# Patient Record
Sex: MALE | Race: WHITE | NOT HISPANIC OR LATINO | Employment: UNEMPLOYED | URBAN - METROPOLITAN AREA
[De-identification: names, ages, dates, MRNs, and addresses within clinical notes are randomized per-mention and may not be internally consistent; named-entity substitution may affect disease eponyms.]

---

## 2022-10-12 ENCOUNTER — APPOINTMENT (EMERGENCY)
Dept: CT IMAGING | Facility: HOSPITAL | Age: 32
DRG: 384 | End: 2022-10-12
Payer: MEDICAID

## 2022-10-12 ENCOUNTER — HOSPITAL ENCOUNTER (INPATIENT)
Facility: HOSPITAL | Age: 32
LOS: 2 days | DRG: 384 | End: 2022-10-14
Attending: EMERGENCY MEDICINE | Admitting: INTERNAL MEDICINE
Payer: MEDICAID

## 2022-10-12 ENCOUNTER — HOSPITAL ENCOUNTER (EMERGENCY)
Facility: HOSPITAL | Age: 32
End: 2022-10-12
Attending: EMERGENCY MEDICINE
Payer: MEDICAID

## 2022-10-12 VITALS
WEIGHT: 198.41 LBS | HEART RATE: 108 BPM | TEMPERATURE: 97.8 F | SYSTOLIC BLOOD PRESSURE: 140 MMHG | DIASTOLIC BLOOD PRESSURE: 85 MMHG | OXYGEN SATURATION: 97 % | RESPIRATION RATE: 18 BRPM

## 2022-10-12 DIAGNOSIS — T44.3X4A ANTICHOLINERGIC DRUG OVERDOSE, UNDETERMINED INTENT, INITIAL ENCOUNTER: ICD-10-CM

## 2022-10-12 DIAGNOSIS — T50.902A INTENTIONAL OVERDOSE, INITIAL ENCOUNTER (HCC): ICD-10-CM

## 2022-10-12 DIAGNOSIS — F43.10 PTSD (POST-TRAUMATIC STRESS DISORDER): ICD-10-CM

## 2022-10-12 DIAGNOSIS — T44.3X1A ANTICHOLINERGIC DRUG OVERDOSE: ICD-10-CM

## 2022-10-12 DIAGNOSIS — S61.512A LACERATION OF LEFT WRIST, INITIAL ENCOUNTER: ICD-10-CM

## 2022-10-12 DIAGNOSIS — Z00.8 MEDICAL CLEARANCE FOR PSYCHIATRIC ADMISSION: ICD-10-CM

## 2022-10-12 DIAGNOSIS — F29 PSYCHOSIS, UNSPECIFIED PSYCHOSIS TYPE (HCC): Primary | ICD-10-CM

## 2022-10-12 DIAGNOSIS — T50.901A OVERDOSE: Primary | ICD-10-CM

## 2022-10-12 DIAGNOSIS — S61.511A LACERATION OF RIGHT WRIST, INITIAL ENCOUNTER: ICD-10-CM

## 2022-10-12 DIAGNOSIS — R41.82 ALTERED MENTAL STATUS: ICD-10-CM

## 2022-10-12 PROBLEM — D72.829 LEUKOCYTOSIS: Status: ACTIVE | Noted: 2022-10-12

## 2022-10-12 PROBLEM — R74.8 ELEVATED CK: Status: ACTIVE | Noted: 2022-10-12

## 2022-10-12 PROBLEM — S61.419A HAND LACERATION: Status: ACTIVE | Noted: 2022-10-12

## 2022-10-12 PROBLEM — S61.519A WRIST LACERATION: Status: ACTIVE | Noted: 2022-10-12

## 2022-10-12 LAB
ALBUMIN SERPL BCP-MCNC: 4.8 G/DL (ref 3.5–5)
ALBUMIN SERPL BCP-MCNC: 5.4 G/DL (ref 3.5–5)
ALP SERPL-CCNC: 37 U/L (ref 34–104)
ALP SERPL-CCNC: 49 U/L (ref 46–116)
ALT SERPL W P-5'-P-CCNC: 16 U/L (ref 7–52)
ALT SERPL W P-5'-P-CCNC: 28 U/L (ref 12–78)
AMPHETAMINES SERPL QL SCN: NEGATIVE
ANION GAP SERPL CALCULATED.3IONS-SCNC: 11 MMOL/L (ref 4–13)
ANION GAP SERPL CALCULATED.3IONS-SCNC: 7 MMOL/L (ref 4–13)
APAP SERPL-MCNC: <10 UG/ML (ref 10–20)
APAP SERPL-MCNC: <2 UG/ML (ref 10–20)
AST SERPL W P-5'-P-CCNC: 17 U/L (ref 13–39)
AST SERPL W P-5'-P-CCNC: 17 U/L (ref 5–45)
ATRIAL RATE: 133 BPM
BARBITURATES UR QL: NEGATIVE
BASE EX.OXY STD BLDV CALC-SCNC: 83.4 % (ref 60–80)
BASE EXCESS BLDV CALC-SCNC: -0.9 MMOL/L
BASOPHILS # BLD AUTO: 0.04 THOUSANDS/ΜL (ref 0–0.1)
BASOPHILS # BLD AUTO: 0.08 THOUSANDS/ΜL (ref 0–0.1)
BASOPHILS NFR BLD AUTO: 0 % (ref 0–1)
BASOPHILS NFR BLD AUTO: 1 % (ref 0–1)
BENZODIAZ UR QL: NEGATIVE
BILIRUB SERPL-MCNC: 0.64 MG/DL (ref 0.2–1)
BILIRUB SERPL-MCNC: 0.64 MG/DL (ref 0.2–1)
BUN SERPL-MCNC: 6 MG/DL (ref 5–25)
BUN SERPL-MCNC: 7 MG/DL (ref 5–25)
CALCIUM SERPL-MCNC: 9.7 MG/DL (ref 8.4–10.2)
CALCIUM SERPL-MCNC: 9.8 MG/DL (ref 8.3–10.1)
CARDIAC TROPONIN I PNL SERPL HS: 2 NG/L (ref 8–18)
CHLORIDE SERPL-SCNC: 102 MMOL/L (ref 96–108)
CHLORIDE SERPL-SCNC: 105 MMOL/L (ref 96–108)
CK MB SERPL-MCNC: 0.6 % (ref 0–2.5)
CK MB SERPL-MCNC: 2.5 NG/ML (ref 0.6–6.3)
CK SERPL-CCNC: 410 U/L (ref 39–308)
CO2 SERPL-SCNC: 27 MMOL/L (ref 21–32)
CO2 SERPL-SCNC: 27 MMOL/L (ref 21–32)
COCAINE UR QL: NEGATIVE
CREAT SERPL-MCNC: 0.77 MG/DL (ref 0.6–1.3)
CREAT SERPL-MCNC: 1.01 MG/DL (ref 0.6–1.3)
EOSINOPHIL # BLD AUTO: 0.01 THOUSAND/ΜL (ref 0–0.61)
EOSINOPHIL # BLD AUTO: 0.03 THOUSAND/ΜL (ref 0–0.61)
EOSINOPHIL NFR BLD AUTO: 0 % (ref 0–6)
EOSINOPHIL NFR BLD AUTO: 0 % (ref 0–6)
ERYTHROCYTE [DISTWIDTH] IN BLOOD BY AUTOMATED COUNT: 12.5 % (ref 11.6–15.1)
ERYTHROCYTE [DISTWIDTH] IN BLOOD BY AUTOMATED COUNT: 12.6 % (ref 11.6–15.1)
ETHANOL SERPL-MCNC: <10 MG/DL
ETHANOL SERPL-MCNC: <3 MG/DL (ref 0–3)
FLUAV RNA RESP QL NAA+PROBE: NEGATIVE
FLUBV RNA RESP QL NAA+PROBE: NEGATIVE
GFR SERPL CREATININE-BSD FRML MDRD: 120 ML/MIN/1.73SQ M
GFR SERPL CREATININE-BSD FRML MDRD: 98 ML/MIN/1.73SQ M
GLUCOSE SERPL-MCNC: 115 MG/DL (ref 65–140)
GLUCOSE SERPL-MCNC: 82 MG/DL (ref 65–140)
HCO3 BLDV-SCNC: 24.6 MMOL/L (ref 24–30)
HCT VFR BLD AUTO: 42.1 % (ref 36.5–49.3)
HCT VFR BLD AUTO: 47.6 % (ref 36.5–49.3)
HGB BLD-MCNC: 14 G/DL (ref 12–17)
HGB BLD-MCNC: 16.1 G/DL (ref 12–17)
IMM GRANULOCYTES # BLD AUTO: 0.04 THOUSAND/UL (ref 0–0.2)
IMM GRANULOCYTES # BLD AUTO: 0.09 THOUSAND/UL (ref 0–0.2)
IMM GRANULOCYTES NFR BLD AUTO: 0 % (ref 0–2)
IMM GRANULOCYTES NFR BLD AUTO: 1 % (ref 0–2)
LYMPHOCYTES # BLD AUTO: 1.21 THOUSANDS/ΜL (ref 0.6–4.47)
LYMPHOCYTES # BLD AUTO: 1.58 THOUSANDS/ΜL (ref 0.6–4.47)
LYMPHOCYTES NFR BLD AUTO: 10 % (ref 14–44)
LYMPHOCYTES NFR BLD AUTO: 9 % (ref 14–44)
MCH RBC QN AUTO: 31.5 PG (ref 26.8–34.3)
MCH RBC QN AUTO: 31.6 PG (ref 26.8–34.3)
MCHC RBC AUTO-ENTMCNC: 33.3 G/DL (ref 31.4–37.4)
MCHC RBC AUTO-ENTMCNC: 33.8 G/DL (ref 31.4–37.4)
MCV RBC AUTO: 93 FL (ref 82–98)
MCV RBC AUTO: 95 FL (ref 82–98)
METHADONE UR QL: NEGATIVE
MONOCYTES # BLD AUTO: 0.84 THOUSAND/ΜL (ref 0.17–1.22)
MONOCYTES # BLD AUTO: 1.35 THOUSAND/ΜL (ref 0.17–1.22)
MONOCYTES NFR BLD AUTO: 6 % (ref 4–12)
MONOCYTES NFR BLD AUTO: 9 % (ref 4–12)
NEUTROPHILS # BLD AUTO: 11.08 THOUSANDS/ΜL (ref 1.85–7.62)
NEUTROPHILS # BLD AUTO: 12.2 THOUSANDS/ΜL (ref 1.85–7.62)
NEUTS SEG NFR BLD AUTO: 80 % (ref 43–75)
NEUTS SEG NFR BLD AUTO: 84 % (ref 43–75)
NRBC BLD AUTO-RTO: 0 /100 WBCS
NRBC BLD AUTO-RTO: 0 /100 WBCS
O2 CT BLDV-SCNC: 17.2 ML/DL
OPIATES UR QL SCN: NEGATIVE
OXYCODONE+OXYMORPHONE UR QL SCN: NEGATIVE
P AXIS: 60 DEGREES
PCO2 BLDV: 43.5 MM HG (ref 42–50)
PCP UR QL: NEGATIVE
PH BLDV: 7.37 [PH] (ref 7.3–7.4)
PLATELET # BLD AUTO: 215 THOUSANDS/UL (ref 149–390)
PLATELET # BLD AUTO: 301 THOUSANDS/UL (ref 149–390)
PMV BLD AUTO: 10.9 FL (ref 8.9–12.7)
PMV BLD AUTO: 11 FL (ref 8.9–12.7)
PO2 BLDV: 49.3 MM HG (ref 35–45)
POTASSIUM SERPL-SCNC: 3.5 MMOL/L (ref 3.5–5.3)
POTASSIUM SERPL-SCNC: 4 MMOL/L (ref 3.5–5.3)
PR INTERVAL: 144 MS
PROT SERPL-MCNC: 6.9 G/DL (ref 6.4–8.4)
PROT SERPL-MCNC: 8.4 G/DL (ref 6.4–8.4)
QRS AXIS: 44 DEGREES
QRSD INTERVAL: 76 MS
QT INTERVAL: 292 MS
QTC INTERVAL: 434 MS
RBC # BLD AUTO: 4.44 MILLION/UL (ref 3.88–5.62)
RBC # BLD AUTO: 5.1 MILLION/UL (ref 3.88–5.62)
RSV RNA RESP QL NAA+PROBE: NEGATIVE
SALICYLATES SERPL-MCNC: 12 MG/DL (ref 3–20)
SALICYLATES SERPL-MCNC: 5 MG/DL (ref 3–20)
SARS-COV-2 RNA RESP QL NAA+PROBE: NEGATIVE
SODIUM SERPL-SCNC: 139 MMOL/L (ref 135–147)
SODIUM SERPL-SCNC: 140 MMOL/L (ref 135–147)
T WAVE AXIS: 60 DEGREES
THC UR QL: NEGATIVE
VENTRICULAR RATE: 133 BPM
WBC # BLD AUTO: 13.27 THOUSAND/UL (ref 4.31–10.16)
WBC # BLD AUTO: 15.28 THOUSAND/UL (ref 4.31–10.16)

## 2022-10-12 PROCEDURE — 85025 COMPLETE CBC W/AUTO DIFF WBC: CPT | Performed by: EMERGENCY MEDICINE

## 2022-10-12 PROCEDURE — 82550 ASSAY OF CK (CPK): CPT | Performed by: EMERGENCY MEDICINE

## 2022-10-12 PROCEDURE — 80143 DRUG ASSAY ACETAMINOPHEN: CPT | Performed by: EMERGENCY MEDICINE

## 2022-10-12 PROCEDURE — 80307 DRUG TEST PRSMV CHEM ANLYZR: CPT | Performed by: EMERGENCY MEDICINE

## 2022-10-12 PROCEDURE — 80053 COMPREHEN METABOLIC PANEL: CPT | Performed by: EMERGENCY MEDICINE

## 2022-10-12 PROCEDURE — 96372 THER/PROPH/DIAG INJ SC/IM: CPT

## 2022-10-12 PROCEDURE — 99254 IP/OBS CNSLTJ NEW/EST MOD 60: CPT | Performed by: SURGERY

## 2022-10-12 PROCEDURE — 93010 ELECTROCARDIOGRAM REPORT: CPT | Performed by: INTERNAL MEDICINE

## 2022-10-12 PROCEDURE — 80179 DRUG ASSAY SALICYLATE: CPT | Performed by: EMERGENCY MEDICINE

## 2022-10-12 PROCEDURE — 93005 ELECTROCARDIOGRAM TRACING: CPT

## 2022-10-12 PROCEDURE — G1004 CDSM NDSC: HCPCS

## 2022-10-12 PROCEDURE — 90471 IMMUNIZATION ADMIN: CPT

## 2022-10-12 PROCEDURE — 82077 ASSAY SPEC XCP UR&BREATH IA: CPT | Performed by: EMERGENCY MEDICINE

## 2022-10-12 PROCEDURE — 12001 RPR S/N/AX/GEN/TRNK 2.5CM/<: CPT | Performed by: EMERGENCY MEDICINE

## 2022-10-12 PROCEDURE — 70450 CT HEAD/BRAIN W/O DYE: CPT

## 2022-10-12 PROCEDURE — 82553 CREATINE MB FRACTION: CPT | Performed by: EMERGENCY MEDICINE

## 2022-10-12 PROCEDURE — 96361 HYDRATE IV INFUSION ADD-ON: CPT

## 2022-10-12 PROCEDURE — 82805 BLOOD GASES W/O2 SATURATION: CPT | Performed by: EMERGENCY MEDICINE

## 2022-10-12 PROCEDURE — 99285 EMERGENCY DEPT VISIT HI MDM: CPT | Performed by: EMERGENCY MEDICINE

## 2022-10-12 PROCEDURE — 90715 TDAP VACCINE 7 YRS/> IM: CPT

## 2022-10-12 PROCEDURE — 0241U HB NFCT DS VIR RESP RNA 4 TRGT: CPT | Performed by: EMERGENCY MEDICINE

## 2022-10-12 PROCEDURE — 0HQBXZZ REPAIR RIGHT UPPER ARM SKIN, EXTERNAL APPROACH: ICD-10-PCS | Performed by: INTERNAL MEDICINE

## 2022-10-12 PROCEDURE — 99223 1ST HOSP IP/OBS HIGH 75: CPT | Performed by: INTERNAL MEDICINE

## 2022-10-12 PROCEDURE — 99285 EMERGENCY DEPT VISIT HI MDM: CPT

## 2022-10-12 PROCEDURE — 36415 COLL VENOUS BLD VENIPUNCTURE: CPT | Performed by: EMERGENCY MEDICINE

## 2022-10-12 PROCEDURE — 96374 THER/PROPH/DIAG INJ IV PUSH: CPT

## 2022-10-12 PROCEDURE — 84484 ASSAY OF TROPONIN QUANT: CPT | Performed by: EMERGENCY MEDICINE

## 2022-10-12 PROCEDURE — 12002 RPR S/N/AX/GEN/TRNK2.6-7.5CM: CPT | Performed by: SURGERY

## 2022-10-12 PROCEDURE — 99284 EMERGENCY DEPT VISIT MOD MDM: CPT

## 2022-10-12 PROCEDURE — 99448 NTRPROF PH1/NTRNET/EHR 21-30: CPT | Performed by: EMERGENCY MEDICINE

## 2022-10-12 PROCEDURE — 74177 CT ABD & PELVIS W/CONTRAST: CPT

## 2022-10-12 RX ORDER — LIDOCAINE HYDROCHLORIDE 10 MG/ML
10 INJECTION, SOLUTION EPIDURAL; INFILTRATION; INTRACAUDAL; PERINEURAL ONCE
Status: DISCONTINUED | OUTPATIENT
Start: 2022-10-12 | End: 2022-10-12 | Stop reason: SDUPTHER

## 2022-10-12 RX ORDER — MIDAZOLAM HYDROCHLORIDE 2 MG/2ML
5 INJECTION, SOLUTION INTRAMUSCULAR; INTRAVENOUS ONCE
Status: COMPLETED | OUTPATIENT
Start: 2022-10-12 | End: 2022-10-12

## 2022-10-12 RX ORDER — SENNOSIDES 8.6 MG
1 TABLET ORAL
Status: DISCONTINUED | OUTPATIENT
Start: 2022-10-12 | End: 2022-10-14 | Stop reason: HOSPADM

## 2022-10-12 RX ORDER — KETAMINE HCL IN NACL, ISO-OSM 100MG/10ML
100 SYRINGE (ML) INJECTION ONCE
Status: COMPLETED | OUTPATIENT
Start: 2022-10-12 | End: 2022-10-12

## 2022-10-12 RX ORDER — PROPOFOL 10 MG/ML
100 INJECTION, EMULSION INTRAVENOUS ONCE
Status: COMPLETED | OUTPATIENT
Start: 2022-10-12 | End: 2022-10-12

## 2022-10-12 RX ORDER — LORAZEPAM 2 MG/ML
2 INJECTION INTRAMUSCULAR ONCE
Status: COMPLETED | OUTPATIENT
Start: 2022-10-12 | End: 2022-10-12

## 2022-10-12 RX ORDER — SODIUM CHLORIDE, SODIUM GLUCONATE, SODIUM ACETATE, POTASSIUM CHLORIDE, MAGNESIUM CHLORIDE, SODIUM PHOSPHATE, DIBASIC, AND POTASSIUM PHOSPHATE .53; .5; .37; .037; .03; .012; .00082 G/100ML; G/100ML; G/100ML; G/100ML; G/100ML; G/100ML; G/100ML
125 INJECTION, SOLUTION INTRAVENOUS CONTINUOUS
Status: DISCONTINUED | OUTPATIENT
Start: 2022-10-12 | End: 2022-10-14

## 2022-10-12 RX ORDER — LORAZEPAM 2 MG/ML
2 INJECTION INTRAMUSCULAR ONCE
Status: DISCONTINUED | OUTPATIENT
Start: 2022-10-12 | End: 2022-10-12

## 2022-10-12 RX ORDER — LIDOCAINE HYDROCHLORIDE 10 MG/ML
10 INJECTION, SOLUTION EPIDURAL; INFILTRATION; INTRACAUDAL; PERINEURAL ONCE
Status: COMPLETED | OUTPATIENT
Start: 2022-10-12 | End: 2022-10-12

## 2022-10-12 RX ORDER — LIDOCAINE HYDROCHLORIDE AND EPINEPHRINE 10; 10 MG/ML; UG/ML
1 INJECTION, SOLUTION INFILTRATION; PERINEURAL ONCE
Status: COMPLETED | OUTPATIENT
Start: 2022-10-12 | End: 2022-10-12

## 2022-10-12 RX ORDER — CEPHALEXIN 500 MG/1
500 CAPSULE ORAL EVERY 6 HOURS SCHEDULED
Status: DISCONTINUED | OUTPATIENT
Start: 2022-10-12 | End: 2022-10-14

## 2022-10-12 RX ORDER — ACETAMINOPHEN 325 MG/1
650 TABLET ORAL EVERY 6 HOURS PRN
Status: DISCONTINUED | OUTPATIENT
Start: 2022-10-12 | End: 2022-10-14 | Stop reason: HOSPADM

## 2022-10-12 RX ORDER — LANOLIN ALCOHOL/MO/W.PET/CERES
6 CREAM (GRAM) TOPICAL
Status: DISCONTINUED | OUTPATIENT
Start: 2022-10-12 | End: 2022-10-14 | Stop reason: HOSPADM

## 2022-10-12 RX ORDER — LORAZEPAM 2 MG/ML
0.5 INJECTION INTRAMUSCULAR EVERY 4 HOURS PRN
Status: DISCONTINUED | OUTPATIENT
Start: 2022-10-12 | End: 2022-10-14 | Stop reason: HOSPADM

## 2022-10-12 RX ORDER — HALOPERIDOL 5 MG/ML
5 INJECTION INTRAMUSCULAR ONCE
Status: COMPLETED | OUTPATIENT
Start: 2022-10-12 | End: 2022-10-12

## 2022-10-12 RX ADMIN — LORAZEPAM 2 MG: 2 INJECTION INTRAMUSCULAR; INTRAVENOUS at 05:08

## 2022-10-12 RX ADMIN — Medication 100 MG: at 13:45

## 2022-10-12 RX ADMIN — LIDOCAINE HYDROCHLORIDE 10 ML: 10 INJECTION, SOLUTION EPIDURAL; INFILTRATION; INTRACAUDAL at 12:49

## 2022-10-12 RX ADMIN — MIDAZOLAM HYDROCHLORIDE 5 MG: 1 INJECTION, SOLUTION INTRAMUSCULAR; INTRAVENOUS at 08:15

## 2022-10-12 RX ADMIN — HALOPERIDOL LACTATE 5 MG: 5 INJECTION, SOLUTION INTRAMUSCULAR at 06:01

## 2022-10-12 RX ADMIN — TETANUS TOXOID, REDUCED DIPHTHERIA TOXOID AND ACELLULAR PERTUSSIS VACCINE, ADSORBED 0.5 ML: 5; 2.5; 8; 8; 2.5 SUSPENSION INTRAMUSCULAR at 14:45

## 2022-10-12 RX ADMIN — LIDOCAINE HYDROCHLORIDE AND EPINEPHRINE 1 ML: 10; 10 INJECTION, SOLUTION INFILTRATION; PERINEURAL at 11:11

## 2022-10-12 RX ADMIN — SODIUM CHLORIDE 1000 ML: 0.9 INJECTION, SOLUTION INTRAVENOUS at 13:38

## 2022-10-12 RX ADMIN — CEPHALEXIN 500 MG: 500 CAPSULE ORAL at 17:55

## 2022-10-12 RX ADMIN — MIDAZOLAM HYDROCHLORIDE 5 MG: 1 INJECTION, SOLUTION INTRAMUSCULAR; INTRAVENOUS at 10:55

## 2022-10-12 RX ADMIN — SODIUM CHLORIDE 1000 ML: 0.9 INJECTION, SOLUTION INTRAVENOUS at 05:08

## 2022-10-12 RX ADMIN — CEPHALEXIN 500 MG: 500 CAPSULE ORAL at 23:07

## 2022-10-12 RX ADMIN — IOHEXOL 100 ML: 300 INJECTION, SOLUTION INTRAVENOUS at 08:40

## 2022-10-12 RX ADMIN — SODIUM CHLORIDE, SODIUM GLUCONATE, SODIUM ACETATE, POTASSIUM CHLORIDE, MAGNESIUM CHLORIDE, SODIUM PHOSPHATE, DIBASIC, AND POTASSIUM PHOSPHATE 125 ML/HR: .53; .5; .37; .037; .03; .012; .00082 INJECTION, SOLUTION INTRAVENOUS at 15:42

## 2022-10-12 RX ADMIN — CEPHALEXIN 500 MG: 500 CAPSULE ORAL at 14:48

## 2022-10-12 RX ADMIN — LIDOCAINE HYDROCHLORIDE,EPINEPHRINE BITARTRATE 1 ML: 10; .01 INJECTION, SOLUTION INFILTRATION; PERINEURAL at 05:08

## 2022-10-12 RX ADMIN — Medication 6 MG: at 23:07

## 2022-10-12 RX ADMIN — PROPOFOL 60 MG: 10 INJECTION, EMULSION INTRAVENOUS at 13:58

## 2022-10-12 RX ADMIN — LORAZEPAM 2 MG: 2 INJECTION INTRAMUSCULAR; INTRAVENOUS at 06:22

## 2022-10-12 NOTE — ASSESSMENT & PLAN NOTE
· Patient able to tell us that he ingested 3/4 of a bottle of Benadryl  · Still undetermined if really intentional or not, as patient at times still not that coherent and does not answer directly the questions  · Suspect may be intentional, as per discussion with the patient's parents, patient has history of PTSD and recently had been sad about his dog, Radha, who is 25years old who is scheduled to be taken down  · Patient developed signs and symptoms of anticholinergic toxicity with the Benadryl overdose:  Patient had agitation, delirium and urinary retention  Bladder catheterization was done which resulted in 1 5 L of urine drained from the bladder  Patient required couple of doses of Versed for agitation  ·  on board  · Telemetry  · IV fluids  · As per recommendations from the , will monitor EKG to ensure no prolongation of patient's QRS (according to the notes, however, I think what the  met was QT interval)  Repeat EKG just a few minutes ago revealed a mildly prolonged QTC interval at 444 milliseconds  Continue EKG monitoring  Continue telemetry  · As per , for patient's agitation, he prefers lorazepam   Thus patient will be on Ativan p r n  Gerhardt Sep · As per recommendations by the ER physician, according to the , we will have the patient on step-down level 2 for now

## 2022-10-12 NOTE — ED NOTES
RN unwrapped and rewrapped patient's b/l wrist lacerations  When RN unwrapped R laceration, there was part of the laceration not stitched and bleeding was uncontrolled  Dr Hiram Preston was made aware and put in a stitch at bedside to control bleeding  L wrist laceration bleeding is controlled  Will continue to monitor        Ernesto Grey RN  10/12/22 5523

## 2022-10-12 NOTE — ED PROVIDER NOTES
History  Chief Complaint   Patient presents with   • Wrist Laceration     Pt arrives via EMS from [facility] a transfer with accepting patient to hand surgery  Pt was found by parents with b/l, lateral lacerations to both wrists  No hx of substance abuse, pt was given haldol and ativan at [facility]  Unable to ask if pt is SI/HI as he is nonverbal upon arrival and has nystagmus  Hx of PTSD     Patient is a 71-year-old male with unknown PMH transferred to our emergency department with bilateral lacerations to the wrists and with altered mental status  It is reported from other [address] [place] [address] at the patient took a large amount of Benadryl and is altered from this  The patient has a very deep laceration to the left wrist that is parallel with the forearm and with muscle protruding from the wound  The patient's other lacerations to the right wrist have been repaired at prior emergency department and our repair with sutures is in place  At this time the patient is unable to answer any questions appropriately but is awake with eyes open and breathing on his own  According to prior note the patient is acutely psychotic and has PTSD  Family reported to prior physician that he had been on medications in the past but has not been taking them recently  There are no loved ones at bedside at this time  None       Past Medical History:   Diagnosis Date   • PTSD (post-traumatic stress disorder)        History reviewed  No pertinent surgical history  History reviewed  No pertinent family history  I have reviewed and agree with the history as documented      E-Cigarette/Vaping   • E-Cigarette Use Never User      E-Cigarette/Vaping Substances     Social History     Tobacco Use   • Smoking status: Never Smoker   • Smokeless tobacco: Never Used   Vaping Use   • Vaping Use: Never used   Substance Use Topics   • Drug use: Yes     Types: Marijuana        Review of Systems       Physical Exam  ED Triage Vitals Temperature Pulse Respirations Blood Pressure SpO2   10/12/22 0745 10/12/22 0745 10/12/22 0745 10/12/22 0745 10/12/22 0745   98 3 °F (36 8 °C) 99 20 156/97 95 %      Temp Source Heart Rate Source Patient Position - Orthostatic VS BP Location FiO2 (%)   10/12/22 0745 10/12/22 0745 10/12/22 0745 10/12/22 0745 --   Oral Monitor Lying Right arm       Pain Score       10/12/22 1410       No Pain             Orthostatic Vital Signs  Vitals:    10/12/22 1500 10/12/22 1530 10/12/22 1600 10/12/22 1630   BP: 127/65 141/74 125/73 129/79   Pulse: 101 92 96 98   Patient Position - Orthostatic VS:  Sitting Sitting        Physical Exam  Vitals and nursing note reviewed  Constitutional:       General: He is in acute distress  Appearance: He is normal weight  HENT:      Head: Normocephalic and atraumatic  Right Ear: External ear normal       Left Ear: External ear normal       Mouth/Throat:      Mouth: Mucous membranes are dry  Pharynx: No oropharyngeal exudate or posterior oropharyngeal erythema  Eyes:      Extraocular Movements: Extraocular movements intact  Conjunctiva/sclera: Conjunctivae normal       Pupils: Pupils are equal, round, and reactive to light  Comments: Pupils 4+   Cardiovascular:      Rate and Rhythm: Normal rate and regular rhythm  Pulses: Normal pulses  Heart sounds: Normal heart sounds  No murmur heard  Pulmonary:      Effort: Pulmonary effort is normal  No respiratory distress  Breath sounds: Normal breath sounds  No wheezing, rhonchi or rales  Abdominal:      General: Abdomen is flat  Palpations: Abdomen is soft  Tenderness: There is abdominal tenderness  There is no guarding or rebound  Musculoskeletal:         General: No swelling, tenderness or deformity  Normal range of motion  Cervical back: Normal range of motion and neck supple  Right lower leg: No edema  Left lower leg: No edema        Comments: Parallel Lacerations to each wrist/forearm   Skin:     General: Skin is warm and dry  Capillary Refill: Capillary refill takes less than 2 seconds  Neurological:      Mental Status: He is lethargic and confused  GCS: GCS eye subscore is 4  GCS verbal subscore is 4  GCS motor subscore is 5  Cranial Nerves: Cranial nerves are intact  No facial asymmetry  Motor: Motor function is intact           ED Medications  Medications   multi-electrolyte (PLASMALYTE-A/ISOLYTE-S PH 7 4) IV solution (125 mL/hr Intravenous New Bag 10/12/22 1542)   acetaminophen (TYLENOL) tablet 650 mg (has no administration in time range)   senna (SENOKOT) tablet 8 6 mg (has no administration in time range)   LORazepam (ATIVAN) injection 0 5 mg (has no administration in time range)   cephalexin (KEFLEX) capsule 500 mg (500 mg Oral Given 10/12/22 1448)   midazolam (VERSED) injection 5 mg (5 mg Intravenous Given 10/12/22 0815)   iohexol (OMNIPAQUE) 300 mg/mL injection 100 mL (100 mL Intravenous Given 10/12/22 0840)   midazolam (VERSED) injection 5 mg (5 mg Intravenous Given 10/12/22 1055)   lidocaine-epinephrine (XYLOCAINE/EPINEPHRINE) 1 %-1:100,000 injection 1 mL (1 mL Infiltration Given by Other 10/12/22 1111)   lidocaine (PF) (XYLOCAINE-MPF) 1 % injection 10 mL (10 mL Infiltration Given by Other 10/12/22 1249)   Ketamine HCl 100 mg (100 mg Intravenous Given by Other 10/12/22 1345)   propofol (DIPRIVAN) 200 MG/20ML bolus injection 100 mg (60 mg Intravenous Given by Other 10/12/22 1358)   sodium chloride 0 9 % bolus 1,000 mL (0 mL Intravenous Stopped 10/12/22 1537)   tetanus-diphtheria-acellular pertussis (BOOSTRIX) IM injection 0 5 mL (0 5 mL Intramuscular Given 10/12/22 1445)       Diagnostic Studies  Results Reviewed     Procedure Component Value Units Date/Time    Ethanol [[id number]]  (Normal) Collected: 10/12/22 0902    Lab Status: Final result Specimen: Blood from Arm, Left Updated: 10/12/22 1124     Ethanol Lvl <10 mg/dL     Rapid drug screen, urine [[id number]]  (Normal) Collected: 10/12/22 1009    Lab Status: Final result Specimen: Urine Updated: 10/12/22 1022     Amph/Meth UR Negative     Barbiturate Ur Negative     Benzodiazepine Urine Negative     Cocaine Urine Negative     Methadone Urine Negative     Opiate Urine Negative     PCP Ur Negative     THC Urine Negative     Oxycodone Urine Negative    Narrative:      FOR MEDICAL PURPOSES ONLY  IF CONFIRMATION NEEDED PLEASE CONTACT THE LAB WITHIN 5 DAYS  Drug Screen Cutoff Levels:  AMPHETAMINE/METHAMPHETAMINES  1000 ng/mL  BARBITURATES     200 ng/mL  BENZODIAZEPINES     200 ng/mL  COCAINE      300 ng/mL  METHADONE      300 ng/mL  OPIATES      300 ng/mL  PHENCYCLIDINE     25 ng/mL  THC       50 ng/mL  OXYCODONE      100 ng/mL    CKMB [[id number]]  (Normal) Collected: 10/12/22 0902    Lab Status: Final result Specimen: Blood from Arm, Left Updated: 10/12/22 1004     CK-MB Index 0 6 %      CK-MB 2 5 ng/mL     COVID/FLU/RSV [[id number]]  (Normal) Collected: 10/12/22 0902    Lab Status: Final result Specimen: Nares from Nose Updated: 10/12/22 0955     SARS-CoV-2 Negative     INFLUENZA A PCR Negative     INFLUENZA B PCR Negative     RSV PCR Negative    Narrative:      FOR PEDIATRIC PATIENTS - copy/paste COVID Guidelines URL to browser: [email or web address] [email or web address]  If You Canx    SARS-CoV-2 assay is a Nucleic Acid Amplification assay intended for the  qualitative detection of nucleic acid from SARS-CoV-2 in nasopharyngeal  swabs  Results are for the presumptive identification of SARS-CoV-2 RNA  Positive results are indicative of infection with SARS-CoV-2, the virus  causing COVID-19, but do not rule out bacterial infection or co-infection  with other viruses  Laboratories within the [place] and its  territories are required to report all positive results to the appropriate  public health authorities   Negative results do not preclude SARS-CoV-2  infection and should not be used as the sole basis for treatment or other  patient management decisions  Negative results must be combined with  clinical observations, patient history, and epidemiological information  This test has not been FDA cleared or approved  This test has been authorized by FDA under an Emergency Use Authorization  (EUA)  This test is only authorized for the duration of time the  declaration that circumstances exist justifying the authorization of the  emergency use of an in vitro diagnostic tests for detection of SARS-CoV-2  virus and/or diagnosis of COVID-19 infection under section 564(b)(1) of  the Act, 21 U  S C  129DIR-1(C)(2), unless the authorization is terminated  or revoked sooner  The test has been validated but independent review by FDA  and CLIA is pending  Test performed using [employer] Gene[employer]pert: This RT-PCR assay targets N2,  a region unique to SARS-CoV-2  A conserved region in the E-gene was chosen  for pan-Sarbecovirus detection which includes SARS-CoV-2  According to CMS-2020-01-R, this platform meets the definition of high-throughput technology      High Sensitivity Troponin I Random [[id number]]  (Abnormal) Collected: 10/12/22 0902    Lab Status: Final result Specimen: Blood from Arm, Left Updated: 10/12/22 0945     HS TnI random 2 ng/L     CK (with reflex to MB) [[id number]]  (Abnormal) Collected: 10/12/22 0902    Lab Status: Final result Specimen: Blood from Arm, Left Updated: 10/12/22 0941     Total  U/L     Acetaminophen level-"If concentration is detectable, please discuss with medical  on call " [[id number]]  (Abnormal) Collected: 10/12/22 0902    Lab Status: Final result Specimen: Blood from Arm, Left Updated: 10/12/22 0941     Acetaminophen Level <72 ug/mL     Salicylate level [[id number]]  (Normal) Collected: 10/12/22 0902    Lab Status: Final result Specimen: Blood from Arm, Left Updated: 86/87/73 6445     Salicylate Lvl 5 mg/dL     Comprehensive metabolic panel [[id number]] Collected: 10/12/22 0902    Lab Status: Final result Specimen: Blood from Arm, Left Updated: 10/12/22 0941     Sodium 139 mmol/L      Potassium 4 0 mmol/L      Chloride 105 mmol/L      CO2 27 mmol/L      ANION GAP 7 mmol/L      BUN 6 mg/dL      Creatinine 0 77 mg/dL      Glucose 82 mg/dL      Calcium 9 7 mg/dL      AST 17 U/L      ALT 16 U/L      Alkaline Phosphatase 37 U/L      Total Protein 6 9 g/dL      Albumin 4 8 g/dL      Total Bilirubin 0 64 mg/dL      eGFR 120 ml/min/1 73sq m     Narrative:      National Kidney Disease Foundation guidelines for Chronic Kidney Disease (CKD):   •  Stage 1 with normal or high GFR (GFR > 90 mL/min/1 73 square meters)  •  Stage 2 Mild CKD (GFR = 60-89 mL/min/1 73 square meters)  •  Stage 3A Moderate CKD (GFR = 45-59 mL/min/1 73 square meters)  •  Stage 3B Moderate CKD (GFR = 30-44 mL/min/1 73 square meters)  •  Stage 4 Severe CKD (GFR = 15-29 mL/min/1 73 square meters)  •  Stage 5 End Stage CKD (GFR <15 mL/min/1 73 square meters)  Note: GFR calculation is accurate only with a steady state creatinine    CBC and differential [[id number]]  (Abnormal) Collected: 10/12/22 0902    Lab Status: Final result Specimen: Blood from Arm, Left Updated: 10/12/22 0922     WBC 13 27 Thousand/uL      RBC 4 44 Million/uL      Hemoglobin 14 0 g/dL      Hematocrit 42 1 %      MCV 95 fL      MCH 31 5 pg      MCHC 33 3 g/dL      RDW 12 6 %      MPV 10 9 fL      Platelets 414 Thousands/uL      nRBC 0 /100 WBCs      Neutrophils Relative 84 %      Immat GRANS % 1 %      Lymphocytes Relative 9 %      Monocytes Relative 6 %      Eosinophils Relative 0 %      Basophils Relative 0 %      Neutrophils Absolute 11 08 Thousands/µL      Immature Grans Absolute 0 09 Thousand/uL      Lymphocytes Absolute 1 21 Thousands/µL      Monocytes Absolute 0 84 Thousand/µL      Eosinophils Absolute 0 01 Thousand/µL      Basophils Absolute 0 04 Thousands/µL     Blood gas, venous [[id number]]  (Abnormal) Collected: 10/12/22 0902    Lab Status: Final result Specimen: Blood from Arm, Left Updated: 10/12/22 0916     pH, Sunday 7 370     pCO2, Sunday 43 5 mm Hg      pO2, Sunday 49 3 mm Hg      HCO3, Sunday 24 6 mmol/L      Base Excess, Sunday -0 9 mmol/L      O2 Content, Sunday 17 2 ml/dL      O2 HGB, VENOUS 83 4 %                  CT head without contrast   Final Result by Jaron Villalta MD (10/12 0910)      No acute intracranial abnormality  Workstation performed: [id number]         CT abdomen pelvis with contrast   Final Result by Jaron Villalta MD (10/12 0912)      No acute pathology  Marked distention of the urinary bladder noted  Workstation performed: [id number]               Procedures  ECG 12 Lead Documentation Only    Date/Time: 10/12/2022 12:09 PM  Performed by: Froilan Bradley DO  Authorized by: Froilan Bradley DO     Indications / Diagnosis:  Anticholinergic toxicity  ECG reviewed by me, the ED Provider: yes    Patient location:  ED  Previous ECG:     Previous ECG:  Compared to current    Similarity:  No change  Interpretation:     Interpretation: normal    Quality:     Tracing quality:  Limited by artifact  Rate:     ECG rate:  82    ECG rate assessment: normal    Rhythm:     Rhythm: sinus rhythm    Ectopy:     Ectopy: none    QRS:     QRS axis:  Normal    QRS intervals:  Normal  Conduction:     Conduction: normal    ST segments:     ST segments:  Normal  T waves:     T waves: normal    Comments:      QT not prolonged  Laceration repair    Date/Time: 10/12/2022 1:00 PM  Performed by: Froilan Bradley DO  Authorized by: Froilan Bradley DO   Consent: Verbal consent obtained    Risks and benefits: risks, benefits and alternatives were discussed  Consent given by: parent  Patient identity confirmed: verbally with patient and arm band  Time out: Immediately prior to procedure a "time out" was called to verify the correct patient, procedure, equipment, support staff and site/side marked as required  Body area: upper extremity  Location details: right lower arm  Laceration length: 1 cm  Foreign bodies: no foreign bodies  Tendon involvement: none  Nerve involvement: none  Vascular damage: no  Anesthesia: local infiltration    Anesthesia:  Local Anesthetic: lidocaine 1% with epinephrine  Anesthetic total: 1 5 mL    Sedation:  Patient sedated: no      Wound Dehiscence:  Superficial Wound Dehiscence: simple closure      Procedure Details:  Irrigation solution: saline  Irrigation method: jet lavage  Amount of cleaning: standard  Debridement: none  Degree of undermining: none  Skin closure: 4-0 Prolene  Number of sutures: 1  Technique: simple  Approximation: close  Approximation difficulty: simple  Dressing: 4x4 sterile gauze and gauze roll  Patient tolerance: patient tolerated the procedure well with no immediate complications    Pre-Procedural Sedation  Performed by: Lacy Yeung DO  Authorized by: Lacy Yeung DO     Consent:     Consent obtained:  Written    Consent given by:  Parent    Risks discussed:   Allergic reaction, inadequate sedation, nausea, vomiting, prolonged hypoxia resulting in organ damage, prolonged sedation necessitating reversal, respiratory compromise necessitating ventilatory assistance and intubation and dysrhythmia  Universal protocol:     Procedure explained and questions answered to patient or proxy's satisfaction: yes      Relevant documents present and verified: yes      Test results available and properly labeled: yes      Patient identity confirmation method:  Arm band and hospital-assigned identification number  Indications:     Sedation purpose:  Laceration repair    Procedure necessitating sedation performed by:  Different physician    Intended level of sedation:  Moderate (conscious sedation)  Pre-sedation assessment:     NPO status caution: unable to specify NPO status      ASA classification: class 2 - patient with mild systemic disease      Neck mobility: normal      Mouth opening:  3 or more finger widths    Thyromental distance:  4 finger widths    Mallampati score:  I - soft palate, uvula, fauces, pillars visible    Pre-sedation assessments completed and reviewed: airway patency, cardiovascular function, hydration status, mental status, nausea/vomiting, respiratory function and temperature      History of difficult intubation: no      Pre-sedation assessment completed:  10/12/2022 1:30 PM  Procedural Sedation    Date/Time: 10/12/2022 1:42 PM  Performed by: Yinka Nguyen DO  Authorized by: Yinka Nguyen DO     Immediate pre-procedure details:     Reassessment: Patient reassessed immediately prior to procedure      Reviewed: vital signs and relevant labs/tests      Verified: bag valve mask available, emergency equipment available, intubation equipment available, IV patency confirmed, oxygen available and suction available    Procedure details (see MAR for exact dosages):     Sedation start time:  10/12/2022 1:43 PM    Preoxygenation:  Nonrebreather mask and nasal cannula    Sedation: Ketamine and propofol  Intra-procedure monitoring:  Blood pressure monitoring, continuous capnometry, frequent LOC assessments, frequent vital sign checks and continuous pulse oximetry    Intra-procedure events comment:  Obstructive apnea    Intra-procedure management:  Airway repositioning    Sedation end time:  10/12/2022 2:06 PM    Total sedation time (minutes):  23  Post-procedure details:     Post-sedation assessment completed:  10/12/2022 2:10 PM    Attendance: Constant attendance by certified staff until patient recovered      Recovery: Patient returned to pre-procedure baseline      Post-sedation assessments completed and reviewed: airway patency, cardiovascular function, mental status, nausea/vomiting and respiratory function      Patient is stable for discharge or admission: yes      Patient tolerance:   Tolerated well, no immediate complications          ED Course  ED Course as of 10/12/22 1645   Wed Oct 12, 2022   1036 Patient is becoming more alert and agitated  Still confused and trying to get out of bed  Will re-dose Versed                             SBIRT 20yo+    Flowsheet Row Most Recent Value   SBIRT (23 yo +)    In order to provide better care to our patients, we are screening all of our patients for alcohol and drug use  Would it be okay to ask you these screening questions? Unable to answer at this time Filed at: 10/12/2022 1153                MDM  Number of Diagnoses or Management Options  Altered mental status  Anticholinergic drug overdose  Laceration of left wrist, initial encounter  Laceration of right wrist, initial encounter  Overdose  Diagnosis management comments: 32M with PMH of paranoid psychosis not taking medications presents to the emergency department after transfer from alternate facility with concern for anticholinergic toxicity after Benadryl overdose and large laceration to left forearm  This is an apparent suicide attempt  Laceration to the patient's right forearm is closed with sutures at prior hospital   There is a 1 cm laceration that is still open and bleeding which is closed in our emergency department  With patient's agitation in regard to anticholinergic toxicity the patient receives several repeated doses benzodiazepines for agitation  Additionally laboratory evaluation reveals mildly elevated CK of 410 and mildly elevated WBC of 13 3, with otherwise normal workup  The patient's EKG does not reveal any QT prolongation and CT scan the patient's head and abdomen are without acute pathology  In order for bedside repair of large left forearm laceration by surgical team conscious sedation is performed using ketamine and propofol  During procedure the patient required jaw thrust to maintain patent airway, but procedure was performed without significant complication    The patient return to baseline altered mental status following procedure  The patient's case is also discussed with the toxicology team on-call who advised continued benzodiazepines as needed for agitation and q4 hour EKG to assess for QT prolongation  A member of the critical care team came and evaluated the patient as well and feel the patient is appropriate for admission under step-down 2 rather than under critical care  The case is discussed with the patient's family and the patient is admitted under step-down 2  Disposition  Final diagnoses:   Overdose   Laceration of left wrist, initial encounter   Anticholinergic drug overdose   Altered mental status   Laceration of right wrist, initial encounter     Time reflects when diagnosis was documented in both MDM as applicable and the Disposition within this note     Time User Action Codes Description Comment    10/12/2022  9:36 AM Ap Radha Add [T50 901A] Overdose     10/12/2022 11:41 AM Jearline Dura Add [O93 871Q] Laceration of left wrist, initial encounter     10/12/2022 12:20 PM Jearline Dura Add [W41 5U0F] Anticholinergic drug overdose     10/12/2022 12:21 PM Jearline Dura Add [R41 82] Altered mental status     10/12/2022 12:21 PM Jearline Dura Add [B39 708C] Laceration of right wrist, initial encounter     10/12/2022  1:51 PM Patricia, Alpiniano B Add [F43 10] PTSD (post-traumatic stress disorder)     10/12/2022  1:52 PM Patricia, Alpiniano B Add [T50 902A] Intentional overdose, initial encounter [facility])       ED Disposition     ED Disposition   Admit    Condition   Stable    Date/Time   Wed Oct 12, 2022 12:20 PM    Comment   Case was discussed with JAYLYN and the patient's admission status was agreed to be Admission Status: inpatient status to the service of Dr Candace Berg  Follow-up Information    None         Patient's Medications    No medications on file     No discharge procedures on file      PDMP Review       Value Time User    PDMP Reviewed  Yes 10/12/2022  1:49 PM Marialuisa Robles MD ED Provider  Attending physically available and evaluated Osirisanton Hernandez  [facility] managed the patient along with the ED Attending      Electronically Signed by         Eben Bui DO  10/12/22 1507

## 2022-10-12 NOTE — CONSULTS
Orthopedics   Angie Byrneist 33 y o  male MRN: [id number]  Unit/Bed#: [facility] CT SCAN      Chief Complaint:   bilateral wrist lacerations    HPI:   33 y o male  presenting to the ED with bilateral volar wrist lacerations  History limited by altered mental status  Parents at bedside with patient  He cannot provide an appropriate response to any questions at this time  R wrist laceration was repaired at [address] [place] [address] ED, L wrist laceration was deemed to require hand surgery closure so he was transferred to [facility] ED  Review Of Systems:   Skin: see hpi  Neuro: See HPI  Musculoskeletal: See HPI  14 point review of systems unobtainable due to pts condition    Past Medical History:   Past Medical History:   Diagnosis Date    PTSD (post-traumatic stress disorder)        Past Surgical History:   History reviewed  No pertinent surgical history  Family History:  Family history reviewed and non-contributory  History reviewed  No pertinent family history      Social History:  Social History     Socioeconomic History    Marital status: Single     Spouse name: None    Number of children: None    Years of education: None    Highest education level: None   Occupational History    None   Tobacco Use    Smoking status: Never Smoker    Smokeless tobacco: Never Used   Vaping Use    Vaping Use: Never used   Substance and Sexual Activity    Alcohol use: None    Drug use: Yes     Types: Marijuana    Sexual activity: None   Other Topics Concern    None   Social History Narrative    None     Social Determinants of Health     Financial Resource Strain: Not on file   Food Insecurity: Not on file   Transportation Needs: Not on file   Physical Activity: Not on file   Stress: Not on file   Social Connections: Not on file   Intimate Partner Violence: Not on file   Housing Stability: Not on file       Allergies:   No Known Allergies        Labs:  0   Lab Value Date/Time    HCT 42 1 10/12/2022 0902    HCT 47 6 10/12/2022 0504    HGB 14 0 10/12/2022 0902 HGB 16 1 10/12/2022 0504    WBC 13 27 (H) 10/12/2022 0902    WBC 15 28 (H) 10/12/2022 0504       Meds:    Current Facility-Administered Medications:     LORazepam (ATIVAN) injection 2 mg, 2 mg, Intravenous, Once, Caren Decree, DO  No current outpatient medications on file  Blood Culture:   No results found for: BLOODCX    Wound Culture:   No results found for: WOUNDCULT    Ins and Outs:  I/O last 24 hours:   In: -   Out: 1000 [Urine:1000]          Physical Exam:   /74 (BP Location: Right arm)   Pulse 92   Temp 98 3 °F (36 8 °C) (Oral)   Resp 18   SpO2 99%   Gen: No acute distress, resting comfortably in bed  HEENT: Eyes clear, moist mucus membranes, hearing intact  Respiratory: No audible wheezing or stridor  Cardiovascular: Well Perfused peripherally, 2+ distal pulse  Abdomen: nondistended, no peritoneal signs  Musculoskeletal: left Hand  Skin: no erythema, 6cm laceration present over distal volar wrist, no exposed tendon there is exposed muscle and small hematoma present   Intact AROM all fingers and wrist with flexion and extension  Sensation intact Radial, Ulna, and Median  Motor intact to Radial, Ulna, and Median  2+ Radial & Ulnar Pulses  Musculature is soft and compressible       _*_*_*_*_*_*_*_*_*_*_*_*_*_*_*_*_*_*_*_*_*_*_*_*_*_*_*_*_*_*_*_*_*_*_*_*_*_*_*_*_*    Assessment:  32 y o male with  bilateral wrist lacerations, s/p R wrist closure by [facility] [facility] ED     Plan:   WBAT B/L UE  L volar wrist laceration irrigated debrided and closed at bedside by Dr Huong Garcia, please see separate procedure note  Dressings to remain intact until 10/14, may perform dressing change daily after that or as needed  Recommend short course of antibiotics for prophylactic purposes  Analgesics for pain  Dispo: Ok for discharge from ortho perspective  Suture removal on or around 10/26/22 this may be performed by facility if pt still hospitalized      Emeka Alaniz PA-C

## 2022-10-12 NOTE — ED PROVIDER NOTES
History  Chief Complaint   Patient presents with   • Psychiatric Evaluation     Patient brought in by parents, has bilateral vertical wrist lacerations, patient unable to answer questions, parents report he moved in with them recently due to mental health and PTSD, patient trying to get off of stretcher during triage     45-year-old male, presents with parents after cutting his wrists with a knife prior to arrival   Patient also noted to have an empty bottle of diphenhydramine allergy medication  Patient is acutely psychotic and unable to provide any history  Parents state patient has history of posttraumatic stress disorder  Had been on medications and seen therapist in the past, not currently  No history of attempted self-harm, no known recent drug or alcohol use  History provided by:  Patient and parent      None       History reviewed  No pertinent past medical history  History reviewed  No pertinent surgical history  History reviewed  No pertinent family history  I have reviewed and agree with the history as documented  E-Cigarette/Vaping   • E-Cigarette Use Never User      E-Cigarette/Vaping Substances     Social History     Tobacco Use   • Smoking status: Never Smoker   • Smokeless tobacco: Never Used   Vaping Use   • Vaping Use: Never used   Substance Use Topics   • Drug use: Yes     Types: Marijuana       Review of Systems   Unable to perform ROS: Psychiatric disorder       Physical Exam  Physical Exam  Vitals and nursing note reviewed  Constitutional:       General: He is not in acute distress  HENT:      Head: Normocephalic and atraumatic  Mouth/Throat:      Mouth: Mucous membranes are dry  Pharynx: Oropharynx is clear  Eyes:      Extraocular Movements: Extraocular movements intact  Pupils: Pupils are equal, round, and reactive to light  Comments: Pupils dilated   Cardiovascular:      Rate and Rhythm: Regular rhythm  Tachycardia present     Pulmonary: Effort: Pulmonary effort is normal       Breath sounds: Normal breath sounds  Abdominal:      Palpations: Abdomen is soft  Tenderness: There is no abdominal tenderness  Musculoskeletal:         General: Normal range of motion  Skin:     General: Skin is warm and dry  Comments: Horizontal lacerations noted to bilateral palmar wrists   Neurological:      General: No focal deficit present  Mental Status: He is alert  Motor: No weakness        Gait: Gait normal    Psychiatric:      Comments: Paranoid, not answering questions appropriately                 Vital Signs  ED Triage Vitals   Temperature Pulse Respirations Blood Pressure SpO2   10/12/22 0509 10/12/22 0507 10/12/22 0507 10/12/22 0507 10/12/22 0507   97 8 °F (36 6 °C) (!) 146 20 140/88 98 %      Temp src Heart Rate Source Patient Position - Orthostatic VS BP Location FiO2 (%)   -- 10/12/22 0507 10/12/22 0507 10/12/22 0507 --    Monitor Sitting Right arm       Pain Score       --                  Vitals:    10/12/22 0507   BP: 140/88   Pulse: (!) 146   Patient Position - Orthostatic VS: Sitting         Visual Acuity      ED Medications  Medications   LORazepam (ATIVAN) injection 2 mg (has no administration in time range)   LORazepam (ATIVAN) injection 2 mg (2 mg Intramuscular Given 10/12/22 0508)   sodium chloride 0 9 % bolus 1,000 mL (1,000 mL Intravenous New Bag 10/12/22 0508)   lidocaine-epinephrine (XYLOCAINE/EPINEPHRINE) 1 %-1:100,000 injection 1 mL (1 mL Infiltration Given 10/12/22 0508)   haloperidol lactate (HALDOL) injection 5 mg (5 mg Intramuscular Given 10/12/22 0601)       Diagnostic Studies  Results Reviewed     Procedure Component Value Units Date/Time    UA (URINE) with reflex to Scope [[id number]]     Lab Status: No result Specimen: Urine     Acetaminophen level-"If concentration is detectable, please discuss with medical  on call " [[id number]]  (Abnormal) Collected: 10/12/22 0504    Lab Status: Final result Specimen: Blood from Arm, Right Updated: 10/12/22 0546     Acetaminophen Level <2 ug/mL     Salicylate level [[id number]]  (Normal) Collected: 10/12/22 0504    Lab Status: Final result Specimen: Blood from Arm, Right Updated: 62/59/02 3426     Salicylate Lvl 70 1 mg/dL     Comprehensive metabolic panel [[id number]]  (Abnormal) Collected: 10/12/22 0504    Lab Status: Final result Specimen: Blood from Arm, Right Updated: 10/12/22 0529     Sodium 140 mmol/L      Potassium 3 5 mmol/L      Chloride 102 mmol/L      CO2 27 mmol/L      ANION GAP 11 mmol/L      BUN 7 mg/dL      Creatinine 1 01 mg/dL      Glucose 115 mg/dL      Calcium 9 8 mg/dL      AST 17 U/L      ALT 28 U/L      Alkaline Phosphatase 49 U/L      Total Protein 8 4 g/dL      Albumin 5 4 g/dL      Total Bilirubin 0 64 mg/dL      eGFR 98 ml/min/1 73sq m     Narrative:      [facility] guidelines for Chronic Kidney Disease (CKD):   •  Stage 1 with normal or high GFR (GFR > 90 mL/min/1 73 square meters)  •  Stage 2 Mild CKD (GFR = 60-89 mL/min/1 73 square meters)  •  Stage 3A Moderate CKD (GFR = 45-59 mL/min/1 73 square meters)  •  Stage 3B Moderate CKD (GFR = 30-44 mL/min/1 73 square meters)  •  Stage 4 Severe CKD (GFR = 15-29 mL/min/1 73 square meters)  •  Stage 5 End Stage CKD (GFR <15 mL/min/1 73 square meters)  Note: GFR calculation is accurate only with a steady state creatinine    Ethanol [[id number]]  (Normal) Collected: 10/12/22 0504    Lab Status: Final result Specimen: Blood from Arm, Right Updated: 10/12/22 0524     Ethanol Lvl <3 mg/dL     CBC and differential [[id number]]  (Abnormal) Collected: 10/12/22 0504    Lab Status: Final result Specimen: Blood from Arm, Right Updated: 10/12/22 0510     WBC 15 28 Thousand/uL      RBC 5 10 Million/uL      Hemoglobin 16 1 g/dL      Hematocrit 47 6 %      MCV 93 fL      MCH 31 6 pg      MCHC 33 8 g/dL      RDW 12 5 %      MPV 11 0 fL      Platelets 896 Thousands/uL      nRBC 0 /100 WBCs Neutrophils Relative 80 %      Immat GRANS % 0 %      Lymphocytes Relative 10 %      Monocytes Relative 9 %      Eosinophils Relative 0 %      Basophils Relative 1 %      Neutrophils Absolute 12 20 Thousands/µL      Immature Grans Absolute 0 04 Thousand/uL      Lymphocytes Absolute 1 58 Thousands/µL      Monocytes Absolute 1 35 Thousand/µL      Eosinophils Absolute 0 03 Thousand/µL      Basophils Absolute 0 08 Thousands/µL     Rapid drug screen, urine [[id number]]     Lab Status: No result Specimen: Urine                  No orders to display              Procedures  ECG 12 Lead Documentation Only    Date/Time: 10/12/2022 5:18 AM  Performed by: Tawanda Sarmiento MD  Authorized by: Tawanda Sarmiento MD     ECG reviewed by me, the ED Provider: yes    Patient location:  ED  Rate:     ECG rate assessment: tachycardic    Rhythm:     Rhythm: sinus tachycardia    QRS:     QRS axis:  Normal    QRS intervals:  Normal  Conduction:     Conduction: normal    ST segments:     ST segments:  Normal  Comments:      Sinus tachycardia 133, normal intervals, no acute changes  Laceration repair    Date/Time: 10/12/2022 5:44 AM  Performed by: Tawanda Sarmiento MD  Authorized by: Tawanda Sarmiento MD   Body area: upper extremity  Location details: right lower arm  Laceration length: 2 5 cm  Tendon involvement: none  Vascular damage: no  Anesthesia: local infiltration    Anesthesia:  Local Anesthetic: lidocaine 1% with epinephrine      Procedure Details:  Irrigation solution: saline  Irrigation method: syringe  Amount of cleaning: extensive  Skin closure: 5-0 nylon  Number of sutures: 3  Dressing: 4x4 sterile gauze and tube gauze  Patient tolerance: patient tolerated the procedure well with no immediate complications    CriticalCare Time  Performed by: Tawanda Sarmiento MD  Authorized by: Tawanda Sarmiento MD     Critical care provider statement:     Critical care time (minutes):  45    Critical care time was exclusive of:  Separately billable procedures and treating other patients    Critical care was necessary to treat or prevent imminent or life-threatening deterioration of the following conditions:  Toxidrome    Critical care was time spent personally by me on the following activities:  Obtaining history from patient or surrogate, development of treatment plan with patient or surrogate, discussions with consultants, evaluation of patient's response to treatment, examination of patient, ordering and performing treatments and interventions, ordering and review of laboratory studies and re-evaluation of patient's condition             ED Course  ED Course as of 10/12/22 0622   Wed Oct 12, 2022   0603 Right wrist laceration loosely closed by myself  Left wrist laceration noted with extensive underlying tissue sticking out  Unable to push down protruding tissue, believe repair will be difficult and patient will require debridement, discussed with on-call hand surgeon Dr Shaji Lundberg  States patient with after being transferred to [facility] ED for her to evaluate    0604 Discussed with the ED attending Dr Neli Kim, patient will be transferred ED to ED  MDM  Number of Diagnoses or Management Options  Diagnosis management comments: 67-year-old male, presenting with paranoia, self-harming cutting bilateral wrists, and overdose of diphenhydramine  Differential diagnosis includes acute psychosis, anticholinergic overdose, laceration among other diagnosis  Patient trying to walk out of ED, is not answering questions appropriately and following commands  Will require medication for agitation, patient also likely with anticholinergic syndrome as he appears dehydrated with tachycardia  EKG ordered, will give IV fluids  Will continue monitor needing re-evaluate         Amount and/or Complexity of Data Reviewed  Clinical lab tests: ordered and reviewed  Tests in the medicine section of CPT®: ordered and reviewed  Obtain history from someone other than the patient: yes  Independent visualization of images, tracings, or specimens: yes        Disposition  Final diagnoses:   Psychosis, unspecified psychosis type ([facility] )   Anticholinergic drug overdose, undetermined intent, initial encounter   Laceration of left wrist, initial encounter     Time reflects when diagnosis was documented in both MDM as applicable and the Disposition within this note     Time User Action Codes Description Comment    10/12/2022  6:11 AM Jarome Sheer Add [F29] Psychosis, unspecified psychosis type ([facility] )     10/12/2022  6:11 AM Jarome Sheer Add [T44 3X4A] Anticholinergic drug overdose, undetermined intent, initial encounter     10/12/2022  6:11 AM Jarome Sheer Add [U70 506U] Laceration of left wrist, initial encounter       ED Disposition     ED Disposition   Transfer to Another Facility-In Network    Condition   --    Date/Time   Wed Oct 12, 2022  6:11 AM    Comment   Patty Banda should be transferred out to [facility]  Follow-up Information    None         Patient's Medications    No medications on file       No discharge procedures on file      PDMP Review     None          ED Provider  Electronically Signed by           Talon Marie MD  10/12/22 6005

## 2022-10-12 NOTE — ASSESSMENT & PLAN NOTE
· Bilateral wrist lacerations  · [place] [facility]   · Left wrist laceration had a deep laceration, thus patient was transferred here to [facility] for repair by a hand orthopedic surgeon  · As per discussion with orthopedic surgeon, patient has a clean contaminated wound, thus at risk for infection, and they recommending providing antibiotic with Keflex  Thus this was ordered  · ER physician was informed about providing tetanus shot  · Pain control  · Local wound care  · Will follow-up further recommendations from orthopedic surgeon

## 2022-10-12 NOTE — ED NOTES
This RN instructed to stop Sodium Choride 0 9% bolus 1000mL by ED Resident  Purpose of bolus was for sedation       Mic Louie  10/12/22 7775

## 2022-10-12 NOTE — ED NOTES
Patient is awake,is confused as to time and place,paranoid,uncooperative at times with staff  Parents at bedside assisting staff with patient  Left forearm laceration about 10 cms long and 5 cms wide, muscle tissue exposed, bleeding continues at slow pace, ER MD unable to suture due to inability to close skin  Right forearm laceration,about 5 cms long and about 3 cms wide, sutured by ER MD  Staff while changing clothes to clean ones, started pushing staff and his mother away,will not lay on stretcher, security on standby, parents are attempting to get him on stretcher but not successful at this time  Patient already medicated and will be medicated with ativan 2mgs IV  Both lacerations to arms covered with clean dry dressings and wrapped in gauze  Patient speaks but does not make sense with his words,patient unable to answer question of why he cut his arms and if he is suicidal,stares out frequently  Patient to be transferred to [address] [place] [address]       Dolores Bach RN  10/12/22 [facility], RN  10/12/22 5413

## 2022-10-12 NOTE — EMTALA/ACUTE CARE TRANSFER
[address] [facility] [address]  [place] [address]  Dept: [phone]      EMTALA TRANSFER CONSENT    NAME Geovanna Palomo                                         1990                              MRN [id number]    I have been informed of my rights regarding examination, treatment, and transfer   by Dr Faye Meier MD    Benefits:   evaluation by specialist not available at this facility    Risks:   discomfort, worsening of condition during transport      Consent for Transfer:  I acknowledge that my medical condition has been evaluated and explained to me by the emergency department physician or other qualified medical person and/or my attending physician, who has recommended that I be transferred to the service of Dr Leticia Merchant   at [facility]    The above potential benefits of such transfer, the potential risks associated with such transfer, and the probable risks of not being transferred have been explained to me, and I fully understand them  The doctor has explained that, in my case, the benefits of transfer outweigh the risks  I agree to be transferred  I authorize the performance of emergency medical procedures and treatments upon me in both transit and upon arrival at the receiving facility  Additionally, I authorize the release of any and all medical records to the receiving facility and request they be transported with me, if possible  I understand that the safest mode of transportation during a medical emergency is an ambulance and that the Hospital advocates the use of this mode of transport  Risks of traveling to the receiving facility by car, including absence of medical control, life sustaining equipment, such as oxygen, and medical personnel has been explained to me and I fully understand them  (YEISON CORRECT BOX BELOW)  [  ]  I consent to the stated transfer and to be transported by ambulance/helicopter    [  ]  I consent to the stated transfer, but refuse transportation by ambulance and accept full responsibility for my transportation by car  I understand the risks of non-ambulance transfers and I exonerate the Hospital and its staff from any deterioration in my condition that results from this refusal     X___________________________________________    DATE  10/12/22  TIME________  Signature of patient or legally responsible individual signing on patient behalf           RELATIONSHIP TO PATIENT_________________________          Provider Certification    NAME Kaelyn Magdaleno                                         1990                              MRN [id number]    A medical screening exam was performed on the above named patient  Based on the examination:    Condition Necessitating Transfer The primary encounter diagnosis was Psychosis, unspecified psychosis type ([facility] )  Diagnoses of Anticholinergic drug overdose, undetermined intent, initial encounter and Laceration of left wrist, initial encounter were also pertinent to this visit  Patient Condition:      Reason for Transfer:      Transfer Requirements: Facility     · Space available and qualified personnel available for treatment as acknowledged by    · Agreed to accept transfer and to provide appropriate medical treatment as acknowledged by          · Appropriate medical records of the examination and treatment of the patient are provided at the time of transfer   [address], [address] _______  · Transfer will be performed by qualified personnel from    and appropriate transfer equipment as required, including the use of necessary and appropriate life support measures      Provider Certification: I have examined the patient and explained the following risks and benefits of being transferred/refusing transfer to the patient/family:         Based on these reasonable risks and benefits to the patient and/or the unborn child(brandon), and based upon the information available at the time of the patient’s examination, I certify that the medical benefits reasonably to be expected from the provision of appropriate medical treatments at another medical facility outweigh the increasing risks, if any, to the individual’s medical condition, and in the case of labor to the unborn child, from effecting the transfer      X____________________________________________ DATE 10/12/22        TIME_______      ORIGINAL - SEND TO MEDICAL RECORDS   COPY - SEND WITH PATIENT DURING TRANSFER

## 2022-10-12 NOTE — H&P
Sarbjit  H&P- Carloz Cervantes 1990, 33 y o  male MRN: [id number]  Unit/Bed#: ED-43 Encounter: [id number]  Primary Care Provider: No primary care provider on file  Date and time admitted to hospital: 10/12/2022  7:28 AM    * Overdose with Benadryl; with anticholinergic toxidrome  Assessment & Plan  · Patient able to tell us that he ingested 3/4 of a bottle of Benadryl  · Still undetermined if really intentional or not, as patient at times still not that coherent and does not answer directly the questions  · Suspect may be intentional, as per discussion with the patient's parents, patient has history of PTSD and recently had been sad about his dog, Honey, who is 25years old who is scheduled to be taken down  · Patient developed signs and symptoms of anticholinergic toxicity with the Benadryl overdose:  Patient had agitation, delirium and urinary retention  Bladder catheterization was done which resulted in 1 5 L of urine drained from the bladder  Patient required couple of doses of Versed for agitation  ·  on board  · Telemetry  · IV fluids  · As per recommendations from the , will monitor EKG to ensure no prolongation of patient's QRS (according to the notes, however, I think what the  met was QT interval)  Repeat EKG just a few minutes ago revealed a mildly prolonged QTC interval at 444 milliseconds  Continue EKG monitoring  Continue telemetry  · As per , for patient's agitation, he prefers lorazepam   Thus patient will be on Ativan p r n  Beverlyn Frisk · As per recommendations by the ER physician, according to the , we will have the patient on step-down level 2 for now  Wrist laceration  Assessment & Plan  · Bilateral wrist lacerations    · Right wrist laceration was repaired in the emergency room at [facility]   · Left wrist laceration had a deep laceration, thus patient was transferred here to [address] for repair by a hand orthopedic surgeon  · As per discussion with orthopedic surgeon, patient has a clean contaminated wound, thus at risk for infection, and they recommending providing antibiotic with Keflex  Thus this was ordered  · ER physician was informed about providing tetanus shot  · Pain control  · Local wound care  · Will follow-up further recommendations from orthopedic surgeon  Elevated CK  Assessment & Plan  · Mild  · Monitor  · IV fluids  · For further evaluation and management if worsens significantly  Leukocytosis  Assessment & Plan  · Likely stress reactive  · No other signs and symptoms of an active infection  · Monitor  · As per recommendations from the hand surgeon, patient was prescribed with Keflex for prophylaxis as patient at risk for having infection from the wrist laceration  · For further evaluation and management if this worsens significantly  PTSD (post-traumatic stress disorder)  Assessment & Plan  · Psychiatrist consult  VTE Pharmacologic Prophylaxis: VTE Score: 0 Low Risk (Score 0-2) - Encourage Ambulation  Code Status: Level 1 - Full Code   Discussion with family: Updated  (father and mother) at bedside  Anticipated Length of Stay: Patient will be admitted on an inpatient basis with an anticipated length of stay of greater than 2 midnights secondary to Above findings and plans       Total Time for Visit, including Counseling / Coordination of Care: 70 minutes Greater than 50% of this total time spent on direct patient counseling and coordination of care  Chief Complaint:  Patient was sent here from [facility] for repair of left wrist laceration; Benadryl overdose and patient also had agitation      History of Present Illness:  Nicole Price is a 33 y o  male with a PMH significant for PTSD who presents with bilateral wrist lacerations, who was transferred from [facility] primarily to repair the left wrist laceration  Patient initially was brought to [facility] as patient was found to have bilateral wrist lacerations and had Benadryl overdose  Right wrist laceration was repaired in the emergency room at [facility], however, patient need to be transferred here as the left wrist laceration was significantly deep and needed a hand orthopedic surgeon to repair it  Patient then developed agitation, delirium episode and was found to have significant urinary retention  Bladder catheterization was done which resulted to 1 5 L of urine drained from the bladder  Patient required Versed to calm him down  When I saw the patient, patient was already awake, but still a little bit drowsy and still at times incoherent and does not directly answer my questions  Patient admitted that he took 3/4 of the bottle of Benadryl  However, when asked about lacerations in his wrist, patient did not directly answer my questions  It was learned from the patient's parents, that patient has history of PTSD and that has been feeling sad lately, as patient's dog, named Radha, who is presently at an advanced age of 25years old, will be put down  At the time I saw the patient, patient denies any other symptoms except for some back pains, for which patient cannot elaborate  Review of Systems:  Review of Systems     Ten point review systems done and they were negative except for the ones I mentioned in my history of present illness  Patient denies any headaches or any dizziness  Patient denies any fever, chills or any cough or colds  Patient denies any chest pains or shortness of breath  Patient denies any nausea or vomiting or any abdominal pains  Patient did not have any urinary problems prior to the urinary retention  No bowel wound problems        Past Medical and Surgical History:   Past Medical History:   Diagnosis Date   • PTSD (post-traumatic stress disorder) History reviewed  No pertinent surgical history  Meds/Allergies:  Prior to Admission medications    Not on File     I have reviewed home medications using recent Epic encounter  Allergies: No Known Allergies    Social History:  Marital Status: Single   Patient Pre-hospital Living Situation: Home, With other family member: Parents  Patient Pre-hospital Level of Mobility: walks    Social History     Substance and Sexual Activity   Alcohol Use None     Social History     Tobacco Use   Smoking Status Never Smoker   Smokeless Tobacco Never Used     Social History     Substance and Sexual Activity   Drug Use Yes   • Types: Marijuana       Family History:  History reviewed  No pertinent family history  Physical Exam:     Vitals:   Blood Pressure: 149/82 (10/12/22 1415)  Pulse: 94 (10/12/22 1415)  Temperature: 98 3 °F (36 8 °C) (10/12/22 0745)  Temp Source: Oral (10/12/22 0745)  Respirations: 20 (10/12/22 1415)  SpO2: 100 % (10/12/22 1415)    Physical Exam  Vitals and nursing note reviewed  Constitutional:       General: He is not in acute distress  Appearance: He is not ill-appearing, toxic-appearing or diaphoretic  Comments: Awake, but still slightly drowsy at times  HENT:      Head: Normocephalic and atraumatic  Mouth/Throat:      Mouth: Mucous membranes are dry  Pharynx: Oropharynx is clear  No oropharyngeal exudate or posterior oropharyngeal erythema  Eyes:      General: No scleral icterus  Right eye: No discharge  Left eye: No discharge  Conjunctiva/sclera: Conjunctivae normal       Pupils: Pupils are equal, round, and reactive to light  Cardiovascular:      Rate and Rhythm: Normal rate and regular rhythm  Heart sounds: Normal heart sounds  Pulmonary:      Effort: Pulmonary effort is normal  No respiratory distress  Breath sounds: Normal breath sounds  Abdominal:      General: Bowel sounds are normal  There is no distension        Palpations: Abdomen is soft  Tenderness: There is no abdominal tenderness  There is no guarding  Musculoskeletal:      Right lower leg: No edema  Left lower leg: No edema  Skin:     General: Skin is dry  Coloration: Skin is not pale  Findings: No erythema or rash  Neurological:      General: No focal deficit present  Mental Status: He is disoriented  Comments: Still has episodes of incoherence and does not answer questions directly  Still has episodes of disorientation  Awake, but still has some episodes of mild drowsiness  Psychiatric:      Comments: Flat affect  Additional Data:     Lab Results:  Results from last 7 days   Lab Units 10/12/22  0902   WBC Thousand/uL 13 27*   HEMOGLOBIN g/dL 14 0   HEMATOCRIT % 42 1   PLATELETS Thousands/uL 215   NEUTROS PCT % 84*   LYMPHS PCT % 9*   MONOS PCT % 6   EOS PCT % 0     Results from last 7 days   Lab Units 10/12/22  0902   SODIUM mmol/L 139   POTASSIUM mmol/L 4 0   CHLORIDE mmol/L 105   CO2 mmol/L 27   BUN mg/dL 6   CREATININE mg/dL 0 77   ANION GAP mmol/L 7   CALCIUM mg/dL 9 7   ALBUMIN g/dL 4 8   TOTAL BILIRUBIN mg/dL 0 64   ALK PHOS U/L 37   ALT U/L 16   AST U/L 17   GLUCOSE RANDOM mg/dL 82                       Imaging: Reviewed radiology reports from this admission including: abdominal/pelvic CT and CT head  CT head without contrast   Final Result by Power Severino MD (10/12 0910)      No acute intracranial abnormality  Workstation performed: [id number]         CT abdomen pelvis with contrast   Final Result by Power Severino MD (10/12 0912)      No acute pathology  Marked distention of the urinary bladder noted  Workstation performed: [id number]             EKG and Other Studies Reviewed on Admission:   · EKG: Initial EKG was sinus tachycardia;  Succeeding 1 revealed normal sinus rhythm with nonspecific ST abnormality    Last 1 in the emergency room again revealed normal sinus rhythm with nonspecific T-wave abnormality  QTC was 444 milliseconds  ** Please Note: This note has been constructed using a voice recognition system   **

## 2022-10-12 NOTE — ASSESSMENT & PLAN NOTE
· Likely stress reactive  · No other signs and symptoms of an active infection  · Monitor  · As per recommendations from the hand surgeon, patient was prescribed with Keflex for prophylaxis as patient at risk for having infection from the wrist laceration  · For further evaluation and management if this worsens significantly

## 2022-10-12 NOTE — CONSULTS
INTERPROFESSIONAL (PHONE) [facility]  Sanford Allen 33 y o  male MRN: [id number]  Unit/Bed#: ED-43 Encounter: [id number]      Reason for Consult / Principal Problem: Diphenhydramine overdose  Inpatient consult to Toxicology  Consult performed by: Kamar Lowery DO  Consult ordered by: Marissa Bustos DO        10/12/22      ASSESSMENT:  1  Diphenhydramine Overdose  2  Anticholinergic toxidrome secondary to #1    RECOMMENDATIONS:  At this point care remains supportive with benzodiazepines as needed for agitation  Versed is short acting and may need to be redosed often  Therefore, lorazepam may be preferred  Monitor for urinary retention with bladder cath as needed    EKG currently looks OK  I'd get one more EKG now to ensure QRS remains normal given exact timing of ingestion us not known  If QRS still normal in repeat EKG, no further EKG's needed from tox standpoint  Diphenhydramine-induced anticholinergic toxidrome/delirium typically resolves in 1-2 days  For further questions, please contact the medical  on call via [place] or throughl the [facility] [employer] or Patient "[employer]  Please see additional teaching note below:        Hx and PE limited by the dynamics of a phone consultation  I have not personally interviewed or evaluated the patient, but only advised based on the information provided to me  Primary provider is responsible for all clinical decisions  Pertinent history, physical exam and clinical findings and course discussed: Sanford Allen is a 33y o  year old male who presents with a reported diphenhydramine ingestion  The patient initially presented with altered mental status to [place] ED after ingesting an unknown quantity of diphenhydramine and cutting his wrists  He was transferred to [facility] for hand surgery specialty   At [facility], the patient was described as exhibiting signs of anticholinergic toxidrome with agitation, delirium, urinary retention  Bladder catheterization was performed, which resulted in 1500cc of urine drained from bladder  He has required a couple doses of versed for agitation  Review of systems and physical exam not performed by me  Historical Information   Past Medical History:   Diagnosis Date   • PTSD (post-traumatic stress disorder)      History reviewed  No pertinent surgical history  Social History   Social History     Substance and Sexual Activity   Alcohol Use None     Social History     Substance and Sexual Activity   Drug Use Yes   • Types: Marijuana     Social History     Tobacco Use   Smoking Status Never Smoker   Smokeless Tobacco Never Used     History reviewed  No pertinent family history  Prior to Admission medications    Not on File       Current Facility-Administered Medications   Medication Dose Route Frequency   • lidocaine (PF) (XYLOCAINE-MPF) 1 % injection 10 mL  10 mL Infiltration Once   • LORazepam (ATIVAN) injection 2 mg  2 mg Intravenous Once       No Known Allergies    Objective       Intake/Output Summary (Last 24 hours) at 10/12/2022 1236  Last data filed at 10/12/2022 0911  Gross per 24 hour   Intake --   Output 1000 ml   Net -1000 ml       Invasive Devices:   Peripheral IV 10/12/22 Proximal;Right;Ventral (anterior) Forearm (Active)       Peripheral IV 10/12/22 Left Antecubital (Active)   Site Assessment WDL 10/12/22 0906   Dressing Type Transparent 10/12/22 0906   Line Status Blood return noted 10/12/22 3051   Dressing Status Clean;Dry; Intact 10/12/22 0906       Urethral Catheter Temperature probe (Active)       Vitals   Vitals:    10/12/22 0745 10/12/22 1050 10/12/22 1100 10/12/22 1130   BP: 156/97 147/74 147/74    TempSrc: Oral      Pulse: 99 95 97 92   Resp: 20 20 18    Patient Position - Orthostatic VS: Lying Lying Lying    Temp: 98 3 °F (36 8 °C)            EKG, Pathology, and/or Other Studies: I have personally reviewed pertinent reports  EKG: NSR at 82   QRS: 82ms    Lab Results: I have personally reviewed pertinent reports  Labs:    Results from last 7 days   Lab Units 10/12/22  0902   WBC Thousand/uL 13 27*   HEMOGLOBIN g/dL 14 0   HEMATOCRIT % 42 1   PLATELETS Thousands/uL 215   NEUTROS PCT % 84*   LYMPHS PCT % 9*   MONOS PCT % 6      Results from last 7 days   Lab Units 10/12/22  0902   SODIUM mmol/L 139   POTASSIUM mmol/L 4 0   CHLORIDE mmol/L 105   CO2 mmol/L 27   BUN mg/dL 6   CREATININE mg/dL 0 77   CALCIUM mg/dL 9 7   ALK PHOS U/L 37   ALT U/L 16   AST U/L 17              No results found for: TROPONINI  Results from last 7 days   Lab Units 10/12/22  0902   PH RENAN  7 370   PCO2 RENAN mm Hg 43 5   PO2 RENAN mm Hg 49 3*   HCO3 RENAN mmol/L 24 6   O2 CONTENT RENAN ml/dL 17 2   O2 HGB, VENOUS % 83 4*     Results from last 7 days   Lab Units 10/12/22  0902   ACETAMINOPHEN LVL ug/mL <10*   ETHANOL LVL mg/dL <22   SALICYLATE LVL mg/dL 5     Invalid input(s): EXTPREGUR      Imaging Studies: I have personally reviewed pertinent reports  Counseling / Coordination of Care  Total time spent today 26 minutes  This was a phone consultation

## 2022-10-12 NOTE — ED NOTES
ER MD at bedside to suture lacerations to both arms at this time, parents at bedside       Andi Sloan RN  10/12/22 [id number]

## 2022-10-13 LAB
ANION GAP SERPL CALCULATED.3IONS-SCNC: 8 MMOL/L (ref 4–13)
BASOPHILS # BLD AUTO: 0.03 THOUSANDS/ΜL (ref 0–0.1)
BASOPHILS NFR BLD AUTO: 0 % (ref 0–1)
BUN SERPL-MCNC: 6 MG/DL (ref 5–25)
CALCIUM SERPL-MCNC: 9.4 MG/DL (ref 8.4–10.2)
CHLORIDE SERPL-SCNC: 104 MMOL/L (ref 96–108)
CK MB SERPL-MCNC: 0.4 % (ref 0–2.5)
CK MB SERPL-MCNC: 1.2 NG/ML (ref 0.6–6.3)
CK SERPL-CCNC: 320 U/L (ref 39–308)
CO2 SERPL-SCNC: 27 MMOL/L (ref 21–32)
CREAT SERPL-MCNC: 0.62 MG/DL (ref 0.6–1.3)
EOSINOPHIL # BLD AUTO: 0.06 THOUSAND/ΜL (ref 0–0.61)
EOSINOPHIL NFR BLD AUTO: 1 % (ref 0–6)
ERYTHROCYTE [DISTWIDTH] IN BLOOD BY AUTOMATED COUNT: 13 % (ref 11.6–15.1)
GFR SERPL CREATININE-BSD FRML MDRD: 132 ML/MIN/1.73SQ M
GLUCOSE SERPL-MCNC: 97 MG/DL (ref 65–140)
HCT VFR BLD AUTO: 44.3 % (ref 36.5–49.3)
HGB BLD-MCNC: 14.8 G/DL (ref 12–17)
IMM GRANULOCYTES # BLD AUTO: 0.04 THOUSAND/UL (ref 0–0.2)
IMM GRANULOCYTES NFR BLD AUTO: 0 % (ref 0–2)
LYMPHOCYTES # BLD AUTO: 1.81 THOUSANDS/ΜL (ref 0.6–4.47)
LYMPHOCYTES NFR BLD AUTO: 19 % (ref 14–44)
MAGNESIUM SERPL-MCNC: 2.4 MG/DL (ref 1.9–2.7)
MCH RBC QN AUTO: 31.6 PG (ref 26.8–34.3)
MCHC RBC AUTO-ENTMCNC: 33.4 G/DL (ref 31.4–37.4)
MCV RBC AUTO: 95 FL (ref 82–98)
MONOCYTES # BLD AUTO: 0.97 THOUSAND/ΜL (ref 0.17–1.22)
MONOCYTES NFR BLD AUTO: 10 % (ref 4–12)
NEUTROPHILS # BLD AUTO: 6.72 THOUSANDS/ΜL (ref 1.85–7.62)
NEUTS SEG NFR BLD AUTO: 70 % (ref 43–75)
NRBC BLD AUTO-RTO: 0 /100 WBCS
PLATELET # BLD AUTO: 201 THOUSANDS/UL (ref 149–390)
PMV BLD AUTO: 11.4 FL (ref 8.9–12.7)
POTASSIUM SERPL-SCNC: 4.1 MMOL/L (ref 3.5–5.3)
RBC # BLD AUTO: 4.69 MILLION/UL (ref 3.88–5.62)
SODIUM SERPL-SCNC: 139 MMOL/L (ref 135–147)
WBC # BLD AUTO: 9.63 THOUSAND/UL (ref 4.31–10.16)

## 2022-10-13 PROCEDURE — 82550 ASSAY OF CK (CPK): CPT | Performed by: INTERNAL MEDICINE

## 2022-10-13 PROCEDURE — 99253 IP/OBS CNSLTJ NEW/EST LOW 45: CPT

## 2022-10-13 PROCEDURE — 85025 COMPLETE CBC W/AUTO DIFF WBC: CPT | Performed by: INTERNAL MEDICINE

## 2022-10-13 PROCEDURE — 82553 CREATINE MB FRACTION: CPT | Performed by: INTERNAL MEDICINE

## 2022-10-13 PROCEDURE — 80048 BASIC METABOLIC PNL TOTAL CA: CPT | Performed by: INTERNAL MEDICINE

## 2022-10-13 PROCEDURE — 83735 ASSAY OF MAGNESIUM: CPT | Performed by: INTERNAL MEDICINE

## 2022-10-13 PROCEDURE — 99232 SBSQ HOSP IP/OBS MODERATE 35: CPT | Performed by: INTERNAL MEDICINE

## 2022-10-13 RX ORDER — NICOTINE 21 MG/24HR
14 PATCH, TRANSDERMAL 24 HOURS TRANSDERMAL DAILY
Status: DISCONTINUED | OUTPATIENT
Start: 2022-10-14 | End: 2022-10-13

## 2022-10-13 RX ORDER — HALOPERIDOL 5 MG/1
5 TABLET ORAL EVERY 6 HOURS PRN
Status: DISCONTINUED | OUTPATIENT
Start: 2022-10-13 | End: 2022-10-14 | Stop reason: HOSPADM

## 2022-10-13 RX ORDER — NICOTINE 21 MG/24HR
14 PATCH, TRANSDERMAL 24 HOURS TRANSDERMAL DAILY
Status: DISCONTINUED | OUTPATIENT
Start: 2022-10-13 | End: 2022-10-14 | Stop reason: HOSPADM

## 2022-10-13 RX ADMIN — CEPHALEXIN 500 MG: 500 CAPSULE ORAL at 18:56

## 2022-10-13 RX ADMIN — NICOTINE 14 MG: 14 PATCH, EXTENDED RELEASE TRANSDERMAL at 17:09

## 2022-10-13 RX ADMIN — SODIUM CHLORIDE, SODIUM GLUCONATE, SODIUM ACETATE, POTASSIUM CHLORIDE, MAGNESIUM CHLORIDE, SODIUM PHOSPHATE, DIBASIC, AND POTASSIUM PHOSPHATE 125 ML/HR: .53; .5; .37; .037; .03; .012; .00082 INJECTION, SOLUTION INTRAVENOUS at 20:05

## 2022-10-13 RX ADMIN — CEPHALEXIN 500 MG: 500 CAPSULE ORAL at 12:02

## 2022-10-13 RX ADMIN — NICOTINE 14 MG: 14 PATCH, EXTENDED RELEASE TRANSDERMAL at 19:58

## 2022-10-13 RX ADMIN — CEPHALEXIN 500 MG: 500 CAPSULE ORAL at 05:24

## 2022-10-13 RX ADMIN — SODIUM CHLORIDE, SODIUM GLUCONATE, SODIUM ACETATE, POTASSIUM CHLORIDE, MAGNESIUM CHLORIDE, SODIUM PHOSPHATE, DIBASIC, AND POTASSIUM PHOSPHATE 125 ML/HR: .53; .5; .37; .037; .03; .012; .00082 INJECTION, SOLUTION INTRAVENOUS at 12:02

## 2022-10-13 RX ADMIN — CEPHALEXIN 500 MG: 500 CAPSULE ORAL at 23:26

## 2022-10-13 NOTE — ASSESSMENT & PLAN NOTE
· Likely stress reactive  · No other signs and symptoms of an active infection  · Started on Keflex for prophylaxis as he is at risk for having infection from wrist laceration  Plan:  · Continue Keflex  · For further evaluation and management if this worsens significantly

## 2022-10-13 NOTE — CONSULTS
Psychiatry Consultation Note   Jami Thomson 33 y o  male MRN: [id number]  Unit/Bed#: S -01 Encounter: [id number]      Assessment and Plan     Assessment       Assessment:    Jami Thomson is a 33 y o  male, single, with past psychiatric history of PTSD who was admitted to [address] [place] [address] ED as a transfer from [facility] ED on 10/12/2022 due to OD with Benadryl; with anticholinergic toxidrome, and b/l wrist and forearms lacerations  Pt was initially brought to [facility] ED and had R wrist laceration repaired  Pt was transferred to [facility] ED for ortho surg to evaluate L wrist/forearm laceration  Laceration was irrigated, debrided, and closed by ortho  Medical Toxicology was consulted and recommended supportive treatment with long-acting benzodiazapine, lorazepam, and repeat EKGs  Psychiatric consultation was requested for management of suicide attempt by OD on Benadryl and b/l wrist lacerations  Today, pt appeared in no acute distress and denied any pain  During the interview, pt appeared bizarre and guarded  He appeared slightly grandiose as he stated that he can get his own help and treatment  He was unwilling to engage in further psychiatric evaluation  He was unwilling to let the team speak with family or friends  He did not appear delirious or overtly having anti-cholinergic side effects  He refused voluntary admission when asked  Given the severity of his suicide attempt by both OD and b/l wrist and forearm lacerations, we are initiating a 302  Principal Psychiatric Problem:  1  Unspecified Mood Disorder    Principal Problem:    Overdose with Benadryl; with anticholinergic toxidrome  Active Problems:    Wrist laceration    PTSD (post-traumatic stress disorder)    Leukocytosis    Elevated CK      Plan     Plan:   Discussed with primary team, with the following recommendations:    1  Treatment Recommendations:  a   Patient poses an acute risk to self and requires inpatient psychiatric hospitalization  b  Patient is unwilling or unable to sign a 201, thus a 302 will be petitioned  c  Medical management per primary team, no new labs indicated at this time  2  Pharmacological:   a  Recommend the following medication changes:   i  Will defer starting maintenance medications pending transfer to inpatient psychiatry  3  Observation level: 1:1 observation for patient safety  4  Psychiatry will continue to follow as needed  Please contact our service via TigerText with any additional questions or concerns  If contacting after hours, please call or TigerText the on-call team (JACQUELINE: [phone]) with any questions or concerns  Risks, benefits and possible side effects of Medications: No new medications at this time  HPI   History of Present Illness   Physician Requesting Consult: Marilin Fleming MD  Reason for Consult / Principal Problem: "Took 3/4 of a bottle of Benadryl, and has bilateral wrist laceration "    Chief Complaint: "I'm fine"    Swapnil Pathak is a 33 y o  male, single, with past psychiatric history of PTSD, and no past medical history who was admitted to [address] [place] [address] as a transfer from [facility] ED on 10/12/2022 due to OD with Benadryl; with anticholinergic toxidrome, and b/l wrist and forearms lacerations  Per chart review, pt was initially brought to [place] [address] ED  In the ED, pt had R wrist laceration repaired  Pt was transferred to [facility] ED for ortho surg to evaluate L wrist/forearm laceration  Laceration was irrigated, debrided, and closed by ortho  Medical Toxicology was consulted and recommended supportive treatment with long-acting benzodiazapine, lorazepam, and repeat EKGs  Psychiatric consultation was requested for management of suicide attempt by OD on Benadryl and b/l wrist lacerations  On initial psychiatric evaluation, Elda Hernadez appeared overtly , calm, laying in bed during interview   Pt reports his mood as "fine " He reports that is not in pain and does not feel tired or groggy  He states that he is in a good state and is not suicidal  States that he does not need our help because he has a team outside of the hospital  He then relates that he does not have a psychiatrist or therapist outpatient  History and exam are limited due to pt refusing to engage  Psychiatric ROS and PMHx     Psychiatric Review Of Systems: Per chart  Pt refuses to engage during interview with writer  Self injurious behavior/risky behavior: yes, cutting wrists, deep laceration on bilateral wrists/forearms prior to admission/OD on 3/4 bottle of Benadryl  Suicidal ideation: status post suicide attempt, currently denies suicidal ideation or plan    Historical Information     Past Psychiatric History:  Pt refuses to engage during interview with writer    Substance Abuse History:  Social History     Tobacco History     Smoking Status  Never Smoker    Smokeless Tobacco Use  Never Used          Alcohol History     Alcohol Use Status  Not Asked          Drug Use     Drug Use Status  Yes Types  Marijuana          Sexual Activity     Sexually Active  Not Asked          Activities of Daily Living    Not Asked                 I am unable to assess the patient for substance use within the past 12 months as they are unable or unwilling to answer    Family Psychiatric History:   Pt refused to engage in interview  Social History: Per chart review  Pt refused to engage in interview  Marital History: single       Traumatic History:   Pt refused to engage in interview  Past Medical History:  Pt refused to engage in interview  Past Medical History:   Diagnosis Date   • PTSD (post-traumatic stress disorder)      History reviewed  No pertinent surgical history  Mental Status Evaluation and Medical ROS     Medical Review Of Systems:  Pertinent items are noted in HPI  Meds/Allergies   All current active medications have been reviewed    No Known Allergies    Objective   Vital signs in last 24 hours:  Temp:  [98 2 °F (36 8 °C)-98 9 °F (37 2 °C)] 98 9 °F (37 2 °C)  HR:  [] 94  Resp:  [14-23] 16  BP: (125-155)/(65-86) 141/70      Intake/Output Summary (Last 24 hours) at 10/13/2022 0758  Last data filed at 10/13/2022 0290  Gross per 24 hour   Intake 1300 ml   Output 4750 ml   Net -3450 ml       Mental Status Evaluation:  Appearance:  disheveled, dressed in hospital attire, wearing glasses, bandages on b/l wrists and forearms   Behavior:  bizarre, guarded, uncooperative, evasive   Speech:  normal rate and volume   Mood:  "I'm fine"   Affect:  flat   Language: naming objects   Thought Process:  illogical   Associations: unable to assess   Thought Content:  unable to assess   Perceptual Disturbances: unable to assess, pt refusing to answer   Risk Potential: Suicidal ideation - None at present, status post suicide attempt  Homicidal ideation - does not answer  Potential for aggression - Not at present   Sensorium:  unable to assess   Memory:  patient does not answer   Consciousness:  alert and awake   Attention/Concentration: attention span and concentration are age appropriate   Intellect: unable to assess due to lack of cooperation   Fund of Knowledge: Unable to assess due to lack of cooperation   Insight:  poor   Judgment: poor   Muscle Strength Muscle Tone: Did not assess   Gait/Station: in bed   Motor Activity: no abnormal movements     Laboratory Results: I have personally reviewed all pertinent laboratory/tests results    Most Recent Labs:   Lab Results   Component Value Date    WBC 9 63 10/13/2022    RBC 4 69 10/13/2022    HGB 14 8 10/13/2022    HCT 44 3 10/13/2022     10/13/2022    RDW 13 0 10/13/2022    NEUTROABS 6 72 10/13/2022    SODIUM 139 10/13/2022    K 4 1 10/13/2022     10/13/2022    CO2 27 10/13/2022    BUN 6 10/13/2022    CREATININE 0 62 10/13/2022    GLUC 97 10/13/2022    CALCIUM 9 4 10/13/2022    AST 17 10/12/2022    ALT 16 10/12/2022    ALKPHOS 37 10/12/2022 TP 6 9 10/12/2022    ALB 4 8 10/12/2022    TBILI 0 64 10/12/2022       Imaging Studies: CT head without contrast    Result Date: 10/12/2022  Narrative: CT BRAIN - WITHOUT CONTRAST INDICATION:   Delirium AMS  COMPARISON:  None  TECHNIQUE:  CT examination of the brain was performed  In addition to axial images, sagittal and coronal 2D reformatted images were created and submitted for interpretation  Radiation dose length product (DLP) for this visit:  965 mGy-cm   This examination, like all CT scans performed in the [facility], was performed utilizing techniques to minimize radiation dose exposure, including the use of iterative reconstruction and automated exposure control  IMAGE QUALITY:  Diagnostic  FINDINGS: PARENCHYMA:  No intracranial mass, mass effect or midline shift  No CT signs of acute infarction  No acute parenchymal hemorrhage  VENTRICLES AND EXTRA-AXIAL SPACES:  Normal for the patient's age  VISUALIZED ORBITS AND PARANASAL SINUSES:  Unremarkable  CALVARIUM AND EXTRACRANIAL SOFT TISSUES:  Normal      Impression: No acute intracranial abnormality  Workstation performed: [id number]     CT abdomen pelvis with contrast    Result Date: 10/12/2022  Narrative: CT ABDOMEN AND PELVIS WITH IV CONTRAST INDICATION:   Abdominal pain, acute, nonlocalized AMS, Apparent abdominal tenderness  COMPARISON:  None  TECHNIQUE:  CT examination of the abdomen and pelvis was performed  Axial, sagittal, and coronal 2D reformatted images were created from the source data and submitted for interpretation  Radiation dose length product (DLP) for this visit:  700 mGy-cm   This examination, like all CT scans performed in the [facility], was performed utilizing techniques to minimize radiation dose exposure, including the use of iterative reconstruction and automated exposure control  IV Contrast:  100 mL of iohexol (OMNIPAQUE) Enteric Contrast:  Enteric contrast was not administered   FINDINGS: ABDOMEN LOWER CHEST:  No clinically significant abnormality identified in the visualized lower chest  LIVER/BILIARY TREE:  Unremarkable  GALLBLADDER:  No calcified gallstones  No pericholecystic inflammatory change  SPLEEN:  Unremarkable  PANCREAS:  Unremarkable  ADRENAL GLANDS:  Unremarkable  KIDNEYS/URETERS:  Unremarkable  No hydronephrosis  STOMACH AND BOWEL:  Unremarkable  APPENDIX:  No findings to suggest appendicitis  ABDOMINOPELVIC CAVITY:  No ascites  No pneumoperitoneum  No lymphadenopathy  VESSELS:  Unremarkable for patient's age  PELVIS REPRODUCTIVE ORGANS:  Unremarkable for patient's age  URINARY BLADDER:  Bladder is markedly distended, but otherwise unremarkable  ABDOMINAL WALL/INGUINAL REGIONS:  Unremarkable  OSSEOUS STRUCTURES:  No acute fracture or destructive osseous lesion  Impression: No acute pathology  Marked distention of the urinary bladder noted  Workstation performed: [id number]     EKG: [id number] on 10/12/2022    Code Status: Level 1 - Full Code  Advance Directive and Living Will:       Power of :      Suicide/Homicide Risk Assessment:  Risk of Harm to Self:  • The following ratings are based on assessment at the time of the interview  • Demographic risk factors include: , never   • Historical Risk Factors include: history of suicide attempt, self-mutilating behaviors  • Recent Specific Risk Factors include: recent suicide attempt  • Protective Factors: no current suicidal ideation, unable to assess due to lack of cooperation by pt  • Weapons: unable to assess due to lack of cooperation by pt   The following steps have been taken to ensure weapons are properly secured: unable to assess due to lack of cooperation by pt  • Based on today's assessment, Edwin Mckeon presents the following risk of harm to self: high    Risk of Harm to Others:  • The following ratings are based on assessment at the time of the interview  • Demographic Risk Factors include: male, unemployed  • Historical Risk Factors include: unable to assess due to lack of cooperation by pt  • Recent Specific Risk Factors include: unable to assess due to lack of cooperation by pt  • Protective Factors: no current homicidal ideation, unable to assess due to lack of cooperation by pt  • Weapons: none and unable to assess due to lack of cooperation by pt  The following steps have been taken to ensure weapons are properly secured: unable to assess due to lack of cooperation by pt  • Based on today's Clementina Mendoza presents the following risk of harm to others: minimal    The following interventions are recommended: referral for inpatient admission      Lakeisha Chatterjee 10/13/22  Medical Student, OMS-IV    This note was completed in part utilizing M-Modal Fluency Direct Software  Grammatical, translation, syntax errors, random word insertions, spelling mistakes, and incomplete sentences may be an occasional consequence of this system secondary to software limitations with voice recognition, ambient noise, and hardware issues  If you have any questions or concerns about the content, text, or information contained within the body of this dictation, please contact the provider for clarification

## 2022-10-13 NOTE — ASSESSMENT & PLAN NOTE
· Patient able to tell us that he ingested 3/4 of a bottle of Benadryl  Patient stated that this was intentional along with cutting of his wrists bilaterally  · Per discussion with the patient's parents, patient has history of PTSD and recently had been sad about his dog, Radha, who is 25years old who is scheduled to be taken down  · Patient developed signs and symptoms of anticholinergic toxicity with the Benadryl overdose:  Patient had agitation, delirium and urinary retention  Bladder catheterization was done which resulted in 1 5 L of urine drained from the bladder  Patient required couple of doses of Versed for agitation  · EKG showed prolonged QTC interval at 444 milliseconds  · Abdomen pelvic CT showed bladder distention  · Patient currently displaying paranoia, overtly aggressiveness, danger to self possibly danger to others  · Psychiatry has deemed him appropriate for 302 --involuntary hospitalization    Plan:  · As per , give Ativan p r n   For agitation  · EKG to monitor QT interval, patient refused EKG  · Telemetry, IV fluids

## 2022-10-13 NOTE — ASSESSMENT & PLAN NOTE
· Mild  Four hundred ten, has come down to 320  Slowly resolving  Plan:  · Monitor  · IV fluids  · For further evaluation and management if worsens significantly

## 2022-10-13 NOTE — PROGRESS NOTES
[facility]  Progress Note Caroline Cage 1990, 33 y o  male MRN: [id number]  Unit/Bed#: S -01 Encounter: [id number]  Primary Care Provider: No primary care provider on file  Date and time admitted to hospital: 10/12/2022  7:28 AM    * Overdose with Benadryl; with anticholinergic toxidrome  Assessment & Plan  · Patient able to tell us that he ingested 3/4 of a bottle of Benadryl  Patient stated that this was intentional along with cutting of his wrists bilaterally  · Per discussion with the patient's parents, patient has history of PTSD and recently had been sad about his dog, Honey, who is 25years old who is scheduled to be taken down  · Patient developed signs and symptoms of anticholinergic toxicity with the Benadryl overdose:  Patient had agitation, delirium and urinary retention  Bladder catheterization was done which resulted in 1 5 L of urine drained from the bladder  Patient required couple of doses of Versed for agitation  · EKG showed prolonged QTC interval at 444 milliseconds  · Abdomen pelvic CT showed bladder distention  · Patient currently displaying paranoia, overtly aggressiveness, danger to self possibly danger to others  · Psychiatry has deemed him appropriate for 302 --involuntary hospitalization    Plan:  · As per , give Ativan p r n  For agitation  · EKG to monitor QT interval, patient refused EKG  · Telemetry, IV fluids    PTSD (post-traumatic stress disorder)  Assessment & Plan  · Psychiatrist consulted, patient refused to talk to them  They deemed him appropriate for involuntary hospitalization  Patient is displaying paranoia and aggressiveness, danger to self, possibly danger to others      Plan:  Continue one-to-one observation  Controlled team    Wrist laceration  Assessment & Plan  · Bilateral wrist lacerations repaired in the ER at [address] [place] [address]  · Left wrist laceration had a deep laceration, thus patient was transferred here to [address] for repair by a hand orthopedic surgeon  · As per discussion with orthopedic surgeon, patient has a clean contaminated wound, thus at risk for infection, and they recommending providing antibiotic with Keflex  Thus this was ordered  · ER physician was informed about providing tetanus shot  Plan:  · Continue Keflex  · Pain control  · Local wound care  · Will follow-up further recommendations from orthopedic surgeon  Elevated CK  Assessment & Plan  · Mild  Four hundred ten, has come down to 320  Slowly resolving  Plan:  · Monitor  · IV fluids  · For further evaluation and management if worsens significantly  Leukocytosis  Assessment & Plan  · Likely stress reactive  · No other signs and symptoms of an active infection  · Started on Keflex for prophylaxis as he is at risk for having infection from wrist laceration  Plan:  · Continue Keflex  · For further evaluation and management if this worsens significantly  VTE Pharmacologic Prophylaxis: VTE Score: 0 Low Risk (Score 0-2) - Encourage Ambulation  Patient Centered Rounds: I performed bedside rounds with nursing staff today  Discussions with Specialists or Other Care Team Provider:  Psychiatry    Education and Discussions with Family / Patient: Updated  (father) at bedside  Current Length of Stay: 1 day(s)  Current Patient Status: Inpatient   Discharge Plan: Anticipate discharge tomorrow to inpatient psych  Code Status: Level 1 - Full Code    Subjective:   Patient seen and examined at the bedside by me  He states that he intentionally slit his wrists and intentionally took the Benadryl pills  He denied any symptoms or any complaints  Patient is displaying paranoia and aggressiveness  He refused to let the attending physician and the team see him during rounds  Patient is refusing medical treatment and is stating that he wants to go home    Patient is still a risk to himself and possibly a risk to others  He is otherwise in no acute distress  Objective:     Vitals:   Temp (24hrs), Av 3 °F (36 8 °C), Min:97 7 °F (36 5 °C), Max:98 9 °F (37 2 °C)    Temp:  [97 7 °F (36 5 °C)-98 9 °F (37 2 °C)] 97 7 °F (36 5 °C)  HR:  [] 86  Resp:  [13-23] 13  BP: (125-155)/(65-86) 142/75  SpO2:  [96 %-100 %] 100 %  There is no height or weight on file to calculate BMI  Input and Output Summary (last 24 hours): Intake/Output Summary (Last 24 hours) at 10/13/2022 1329  Last data filed at 10/13/2022 1247  Gross per 24 hour   Intake 1300 ml   Output 4800 ml   Net -3500 ml       Physical Exam:   Physical Exam  Constitutional:       General: He is not in acute distress  Appearance: Normal appearance  He is normal weight  He is not ill-appearing or diaphoretic  HENT:      Head: Normocephalic and atraumatic  Eyes:      General: No scleral icterus  Extraocular Movements: Extraocular movements intact  Conjunctiva/sclera: Conjunctivae normal       Pupils: Pupils are equal, round, and reactive to light  Cardiovascular:      Rate and Rhythm: Normal rate and regular rhythm  Pulses: Normal pulses  Heart sounds: Normal heart sounds  No murmur heard  No friction rub  No gallop  Pulmonary:      Effort: Pulmonary effort is normal  No respiratory distress  Breath sounds: Normal breath sounds  No wheezing or rales  Abdominal:      General: Abdomen is flat  Bowel sounds are normal  There is no distension  Palpations: Abdomen is soft  Tenderness: There is no abdominal tenderness  There is no guarding  Musculoskeletal:         General: Signs of injury (Left and right wrists were caught  They are currently bandaged with Ace wrap ) present  Right lower leg: No edema  Left lower leg: No edema  Skin:     General: Skin is warm  Capillary Refill: Capillary refill takes less than 2 seconds     Neurological:      Mental Status: He is alert and oriented to person, place, and time  Psychiatric:      Comments: Is displaying paranoid delusions, he believes that medical staff is poisoning him with medications and poisoning his water          Additional Data:     Labs:  Results from last 7 days   Lab Units 10/13/22  0524   WBC Thousand/uL 9 63   HEMOGLOBIN g/dL 14 8   HEMATOCRIT % 44 3   PLATELETS Thousands/uL 201   NEUTROS PCT % 70   LYMPHS PCT % 19   MONOS PCT % 10   EOS PCT % 1     Results from last 7 days   Lab Units 10/13/22  0524 10/12/22  0902   SODIUM mmol/L 139 139   POTASSIUM mmol/L 4 1 4 0   CHLORIDE mmol/L 104 105   CO2 mmol/L 27 27   BUN mg/dL 6 6   CREATININE mg/dL 0 62 0 77   ANION GAP mmol/L 8 7   CALCIUM mg/dL 9 4 9 7   ALBUMIN g/dL  --  4 8   TOTAL BILIRUBIN mg/dL  --  0 64   ALK PHOS U/L  --  37   ALT U/L  --  16   AST U/L  --  17   GLUCOSE RANDOM mg/dL 97 82                       Lines/Drains:  Invasive Devices  Report    Peripheral Intravenous Line  Duration           Peripheral IV 10/12/22 Left Antecubital 1 day    Peripheral IV 10/12/22 Proximal;Right;Ventral (anterior) Forearm 1 day                  Telemetry:  Telemetry Orders (From admission, onward)             48 Hour Telemetry Monitoring  Continuous x 48 hours        References:    Telemetry Guidelines   Question:  Reason for 48 Hour Telemetry  Answer:  Arrhythmias Requiring Medical Therapy (eg  SVT, Vtach/fib, Bradycardia, Uncontrolled A-fib)                 Telemetry Reviewed: Normal Sinus Rhythm  Indication for Continued Telemetry Use: Arrthymias requiring medical therapy             Imaging: Reviewed radiology reports from this admission including: abdominal/pelvic CT    Recent Cultures (last 7 days):         Last 24 Hours Medication List:   Current Facility-Administered Medications   Medication Dose Route Frequency Provider Last Rate   • acetaminophen  650 mg Oral Q6H PRN Ruby Morales MD     • cephalexin  500 mg Oral Q6H Sheldon Gomez MD     • LORazepam  0 5 mg Intravenous Q4H PRN Norberto Gomez MD     • melatonin  6 mg Oral HS Miguel Cardozo PA-C     • multi-electrolyte  125 mL/hr Intravenous Continuous Carlosiniajoleen Thornton  mL/hr (10/13/22 1202)   • senna  1 tablet Oral HS PRN Christoph Barger MD          Today, Patient Was Seen By: Dhaval Lema    **Please Note: This note may have been constructed using a voice recognition system  **

## 2022-10-13 NOTE — UTILIZATION REVIEW
Initial Clinical Review    Admission: Date/Time/Statement:   Admission Orders (From admission, onward)     Ordered        10/12/22 1224  [address] [place]  Once                      Orders Placed This Encounter   Procedures   • INPATIENT ADMISSION     Standing Status:   Standing     Number of Occurrences:   1     Order Specific Question:   Level of Care     Answer:   Level 2 Stepdown / HOT [14]     Order Specific Question:   Estimated length of stay     Answer:   More than 2 Midnights     Order Specific Question:   Certification     Answer:   I certify that inpatient services are medically necessary for this patient for a duration of greater than two midnights  See H&P and MD Progress Notes for additional information about the patient's course of treatment  ED Arrival Information     Expected   10/12/2022 06:14    Arrival   10/12/2022 07:28    Acuity   Emergent            Means of arrival   Ambulance    Escorted by   -    Service   Hospitalist    Admission type   Emergency            Arrival complaint   laceration of hand           Chief Complaint   Patient presents with   • Wrist Laceration     Pt arrives via EMS from [place] as a transfer with accepting patient to hand surgery  Pt was found by parents with b/l, lateral lacerations to both wrists  No hx of substance abuse, pt was given haldol and ativan at [facility]  Unable to ask if pt is SI/HI as he is nonverbal upon arrival and has nystagmus  Hx of PTSD       Initial Presentation: 33 y o  male PMH significant for PTSD who presents to [facility] ED as transfer from [facility] ED for higher level of care/ortho hand surgery to repair L wrist laceration  Pt presented to [facility] with with bilat wrist lacerations and benadryl OD  R wrist laceration repaired in ED DOROTEO Swift  left wrist laceration was significantly deep and needed a hand orthopedic surgeon to repair it   On exam, DOROTEO Ball, developed agitation, delirium episode , was found to have significant urinary retention  Bladder catheterization  resulted to 1 5 L of urine drained from the bladder  Patient required Versed to calm him down  Later, pt  awake, but drowsy and still at times incoherent , at times disoriented,and does not directly answer questions  Flat affect   Patient admitted that he took 3/4 of the bottle of Benadryl  Pt reports some back pain   Labs -WBC 13 27,    CT head shows nothing acute, CT A/P showed distended bladder Repeat EKG  revealed a mildly prolonged QTC interval at 444   [place] Pt admitted as Inpatient to [address] with Benadryl Overdose with anticholinergic toxidrome, wrist laceration, leukocytosis, elevated CK  Plan-telemetry, IVF, toxicology consult, ortho consult, psych consult  Pain control, local wound care , po Keflex   Monitor QTc  Monitor CK level  Medical toxicology-supportive care with benzos as needed for agitation, lorazepam preferred over versed  Monitor ffot urinary retention  Monitor ECG for QTc  Ortho consult-  6cm laceration present over distal volar wrist, no exposed tendon there is exposed muscle and small hematoma present   Full motor function of AIN PIN and ulnar nerve  Sensation is intact to the median ulnar and radial nerve distribution  Brisk cap refill  Palpable radial pulse  Bedside wound debridement was performed with hematoma debridement  The laceration was then thoroughly irrigated and closed with sutures to provide skin eversion    Dressings to remain intact until 10/14, may perform dressing change daily after that or as needed  Date: 10/13  Day 2: telemetry sinus rhythm,  Bilat wrist drsg dry  Pt anxious, alet, oriented x4 per nursing  Indwelling urinary catheter removed this am      Psych consult-pt appeared in no acute distress , denied pain  Pt appeared bizarre and guarded during interview  He appeared slightly grandiose as he stated that he can get his own help and treatment  He was unwilling to engage in further psychiatric evaluation   He was unwilling to let the team speak with family or friends  He did not appear delirious or overtly having anti-cholinergic side effects  He refused voluntary admission when asked  Given the severity of his suicide attempt by both OD and b/l wrist and forearm lacerations,  initiating a 302  Continue 1:1 observation      ED Triage Vitals   Temperature Pulse Respirations Blood Pressure SpO2   10/12/22 0745 10/12/22 0745 10/12/22 0745 10/12/22 0745 10/12/22 0745   98 3 °F (36 8 °C) 99 20 156/97 95 %      Temp Source Heart Rate Source Patient Position - Orthostatic VS BP Location FiO2 (%)   10/12/22 0745 10/12/22 0745 10/12/22 0745 10/12/22 0745 --   Oral Monitor Lying Right arm       Pain Score       10/12/22 1410       No Pain          Wt Readings from Last 1 Encounters:   10/12/22 90 kg (198 lb 6 6 oz)     Additional Vital Signs:   Date/Time Temp Pulse Resp BP MAP (mmHg) SpO2 Calculated FIO2 (%) - Nasal Cannula Nasal Cannula O2 Flow Rate (L/min) O2 Device   10/13/22 0703 98 9 °F (37 2 °C) 94 16 141/70 -- 98 % -- -- None (Room air)   10/13/22 0256 -- 84 16 126/69 92 99 % 24 1 L/min Nasal cannula   10/12/22 2243 98 2 °F (36 8 °C) 74 18 127/70 90 100 % 24 1 L/min Nasal cannula   10/12/22 1900 -- 81 14 126/74 93 96 % -- -- None (Room air)   10/12/22 1700 -- 97 16 139/75 101 98 % -- -- None (Room air)   10/12/22 1630 -- 98 16 129/79 99 98 % -- -- None (Room air)   10/12/22 1600 -- 96 18 125/73 92 99 % -- -- None (Room air)   10/12/22 1530 -- 92 18 141/74 101 98 % -- -- None (Room air)   10/12/22 1500 -- 101 18 127/65 88 99 % -- -- None (Room air)   10/12/22 1430 -- 97 18 141/65 96 98 % -- -- None (Room air)   10/12/22 14:15:31 -- 94 20 149/82 108 100 % -- -- None (Room air)   10/12/22 14:10:56 -- 96 21 153/84 111 100 % 24 1 L/min Nasal cannula   10/12/22 14:07:27 -- 96 16 154/75 107 100 % 24 1 L/min Nasal cannula   10/12/22 14:00:46 -- 99 23 Abnormal  154/74 107 100 % 80 15 L/min Non-rebreather mask   10/12/22 13:55:32 -- 95 21 148/69 101 100 % 80 15 L/min Non-rebreather mask   10/12/22 13:50:26 -- 98 22 155/76 106 100 % 80 15 L/min Non-rebreather mask   10/12/22 13:44:04 -- 94 16 142/86 -- 100 % 80 15 L/min Non-rebreather mask   10/12/22 1330 -- 101 18 142/86 104 100 % -- -- None (Room air)   10/12/22 1130 -- 92 -- -- -- 99 % -- -- --   10/12/22 1100 -- 97 18 147/74 103 98 % -- -- None (Room air)       Pertinent Labs/Diagnostic Test Results:   10/12 ECG-ECG rate:  82     ECG rate assessment: normal     Rhythm:     Rhythm: sinus rhythm     Ectopy:     Ectopy: none     QRS:     QRS axis:  Normal     QRS intervals:  Normal   Conduction:     Conduction: normal     ST segments:     ST segments:  Normal   T waves:     T waves: normal     Comments:      QT not prolonged  CT head without contrast   Final Result by Violeta Mcdermott MD (10/12 0910)      No acute intracranial abnormality  Workstation performed: [id number]         CT abdomen pelvis with contrast   Final Result by Violeta Mcdermott MD (10/12 0912)      No acute pathology  Marked distention of the urinary bladder noted              Workstation performed: [id number]           Results from last 7 days   Lab Units 10/12/22  0902   SARS-COV-2  Negative     Results from last 7 days   Lab Units 10/13/22  0524 10/12/22  0902 10/12/22  0504   WBC Thousand/uL 9 63 13 27* 15 28*   HEMOGLOBIN g/dL 14 8 14 0 16 1   HEMATOCRIT % 44 3 42 1 47 6   PLATELETS Thousands/uL 201 215 301   NEUTROS ABS Thousands/µL 6 72 11 08* 12 20*         Results from last 7 days   Lab Units 10/13/22  0524 10/12/22  0902 10/12/22  0504   SODIUM mmol/L 139 139 140   POTASSIUM mmol/L 4 1 4 0 3 5   CHLORIDE mmol/L 104 105 102   CO2 mmol/L 27 27 27   ANION GAP mmol/L 8 7 11   BUN mg/dL 6 6 7   CREATININE mg/dL 0 62 0 77 1 01   EGFR ml/min/1 73sq m 132 120 98   CALCIUM mg/dL 9 4 9 7 9 8   MAGNESIUM mg/dL 2 4  --   --      Results from last 7 days   Lab Units 10/12/22  0902 10/12/22  0504   AST U/L 17 17 ALT U/L 16 28   ALK PHOS U/L 37 49   TOTAL PROTEIN g/dL 6 9 8 4   ALBUMIN g/dL 4 8 5 4*   TOTAL BILIRUBIN mg/dL 0 64 0 64         Results from last 7 days   Lab Units 10/13/22  0524 10/12/22  0902 10/12/22  0504   GLUCOSE RANDOM mg/dL 97 82 115             No results found for: BETA-HYDROXYBUTYRATE       Results from last 7 days   Lab Units 10/12/22  0902   PH RENAN  7 370   PCO2 RENAN mm Hg 43 5   PO2 RENAN mm Hg 49 3*   HCO3 RENAN mmol/L 24 6   BASE EXC RENAN mmol/L -0 9   O2 CONTENT RENAN ml/dL 17 2   O2 HGB, VENOUS % 83 4*         Results from last 7 days   Lab Units 10/13/22  0524 10/12/22  0902   CK TOTAL U/L 320* 410*   CK MB INDEX % 0 4 0 6   CK MB ng/mL 1 2 2 5                   Results from last 7 days   Lab Units 10/12/22  0902   INFLUENZA A PCR  Negative   INFLUENZA B PCR  Negative   RSV PCR  Negative         Results from last 7 days   Lab Units 10/12/22  1009   AMPH/METH  Negative   BARBITURATE UR  Negative   BENZODIAZEPINE UR  Negative   COCAINE UR  Negative   METHADONE URINE  Negative   OPIATE UR  Negative   PCP UR  Negative   THC UR  Negative     Results from last 7 days   Lab Units 10/12/22  0902 10/12/22  0504   ETHANOL LVL mg/dL <10 <3   ACETAMINOPHEN LVL ug/mL <32* <2*   SALICYLATE LVL mg/dL 5 12 0             ED Treatment:   Medication Administration from 10/12/2022 0614 to 10/12/2022 1732       Date/Time Order Dose Route Action Comments     10/12/2022 0815 midazolam (VERSED) injection 5 mg 5 mg Intravenous Given      10/12/2022 0840 iohexol (OMNIPAQUE) 300 mg/mL injection 100 mL 100 mL Intravenous Given      10/12/2022 1055 midazolam (VERSED) injection 5 mg 5 mg Intravenous Given      10/12/2022 1111 lidocaine-epinephrine (XYLOCAINE/EPINEPHRINE) 1 %-1:100,000 injection 1 mL 1 mL Infiltration Given by Other given to Dr Katt Benítez     10/12/2022 1249 lidocaine (PF) (XYLOCAINE-MPF) 1 % injection 10 mL 10 mL Infiltration Given by Other given by hand surgery provider     10/12/2022 1345 Ketamine HCl 100 mg 100 mg Intravenous Given by Other by dr Stephany Be     10/12/2022 1358 propofol (DIPRIVAN) 200 MG/20ML bolus injection 100 mg 60 mg Intravenous Given by Other given by ED attending     10/12/2022 1338 sodium chloride 0 9 % bolus 1,000 mL 1,000 mL Intravenous New Bag      10/12/2022 1542 multi-electrolyte (PLASMALYTE-A/ISOLYTE-S PH 7 4) IV solution 125 mL/hr Intravenous New Bag      10/12/2022 1445 tetanus-diphtheria-acellular pertussis (BOOSTRIX) IM injection 0 5 mL 0 5 mL Intramuscular Given      10/12/2022 1448 cephalexin (KEFLEX) capsule 500 mg 500 mg Oral Given         Past Medical History:   Diagnosis Date   • PTSD (post-traumatic stress disorder)      Present on Admission:  • Wrist laceration  • PTSD (post-traumatic stress disorder)  • Overdose with Benadryl; with anticholinergic toxidrome  • Leukocytosis  • Elevated CK      Admitting Diagnosis: Overdose [T50 901A]  Altered mental status [R41 82]  PTSD (post-traumatic stress disorder) [F43 10]  Hand laceration [S61 419A]  Anticholinergic drug overdose [T44 3X1A]  Laceration of right wrist, initial encounter [S61 511A]  Laceration of left wrist, initial encounter [S61 512A]  Intentional overdose, initial encounter ([facility] [address] ) [T50 902A]  Age/Sex: 33 y o  male  Admission Orders:  Scheduled Medications:  cephalexin, 500 mg, Oral, Q6H [address]  melatonin, 6 mg, Oral, HS        Continuous IV Infusions:  multi-electrolyte, 125 mL/hr, Intravenous, Continuous      PRN Meds:  acetaminophen, 650 mg, Oral, Q6H PRN  LORazepam, 0 5 mg, Intravenous, Q4H PRN  senna, 1 tablet, Oral, HS PRN    telemetry   1:1 continual observation  Spirometry        IP CONSULT TO TOXICOLOGY  IP CONSULT TO ORTHOPEDIC SURGERY  IP CONSULT TO PSYCHIATRY    Network Utilization Review Department  ATTENTION: Please call with any questions or concerns to [phone] and carefully listen to the prompts so that you are directed to the right person   All voicemails are confidential   Shen Begum all requests for admission clinical reviews, approved or denied determinations and any other requests to dedicated fax number below belonging to the campus where the patient is receiving treatment   List of dedicated fax numbers for the Facilities:  [address] [place] [address] DENIALS (Administrative/Medical Necessity) [phone]   [address] (Maternity/NICU/Pediatrics) [phone]   [address] [phone]   [facility]  [phone]   1302 [facility] [address] [address] [facility] 28 [phone]   1559 [facility] 134 815 [facility] [phone]

## 2022-10-13 NOTE — QUICK NOTE
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

## 2022-10-13 NOTE — CASE MANAGEMENT
Case Management Discharge Planning Note    Patient name Arnav Reeves  Location S /S -01 MRN [id number]  : 1990 Date 10/13/2022       Current Admission Date: 10/12/2022  Current Admission Diagnosis:Overdose with Benadryl; with anticholinergic toxidrome   Patient Active Problem List    Diagnosis Date Noted   • Wrist laceration 10/12/2022   • PTSD (post-traumatic stress disorder) 10/12/2022   • Overdose with Benadryl; with anticholinergic toxidrome 10/12/2022   • Leukocytosis 10/12/2022   • Elevated CK 10/12/2022      LOS (days): 1  Geometric Mean LOS (GMLOS) (days): 2 50  Days to GMLOS:1 3     OBJECTIVE:  Risk of Unplanned Readmission Score: 8 34         Current admission status: Inpatient   Preferred Pharmacy:   PATIENT/FAMILY REPORTS NO PREFERRED PHARMACY  No address on file      Primary Care Provider: No primary care provider on file  Primary Insurance: MISC MEDICAID [employer]  Secondary Insurance:     DISCHARGE DETAILS:    Discharge planning discussed with[de-identified] Patient and parents  Freedom of Choice: Yes  Comments - Freedom of Choice: Reviewed with patient 302 status  CM contacted family/caregiver?: Yes  Were Treatment Team discharge recommendations reviewed with patient/caregiver?: Yes  Did patient/caregiver verbalize understanding of patient care needs?: N/A- going to facility  Were patient/caregiver advised of the risks associated with not following Treatment Team discharge recommendations?: Yes    Contacts  Patient Contacts: Patient and his parents  Relationship to Patient[de-identified] Family  Contact Method: In Person  Reason/Outcome: Discharge Planning, Continuity of [address]         Is the patient interested in [facility] at discharge?: No    DME Referral Provided  Referral made for DME?: No    Other Referral/Resources/Interventions Provided:  Interventions: Inpatient Behavioral Health       CM met with patient and his parents at bedside  CM name and role reviewed      CM reviewed 302 status and reviewed Patient's Rights  CM obtained missing information to complete the Act 77 form  Both the Act 77 form and 302 were then faxed to [facility]  CM answered patient's questions regarding where he will go for his IP BH  Patient then stopped talking to CM  CM checked with his parents at bedside who had no further questions  CM sent patient's 151 and facesheet to  Crisis Intake to review  CM department will continue to follow to assist with discharge coordination

## 2022-10-13 NOTE — ASSESSMENT & PLAN NOTE
· Bilateral wrist lacerations repaired in the ER at [address] [place] [address]  · Left wrist laceration had a deep laceration, thus patient was transferred here to [facility] for repair by a hand orthopedic surgeon  · As per discussion with orthopedic surgeon, patient has a clean contaminated wound, thus at risk for infection, and they recommending providing antibiotic with Keflex  Thus this was ordered  · ER physician was informed about providing tetanus shot  Plan:  · Continue Keflex  · Pain control  · Local wound care  · Will follow-up further recommendations from orthopedic surgeon

## 2022-10-13 NOTE — ASSESSMENT & PLAN NOTE
· Psychiatrist consulted, patient refused to talk to them  They deemed him appropriate for involuntary hospitalization  Patient is displaying paranoia and aggressiveness, danger to self, possibly danger to others      Plan:  Continue one-to-one observation  Controlled team

## 2022-10-14 VITALS
SYSTOLIC BLOOD PRESSURE: 136 MMHG | DIASTOLIC BLOOD PRESSURE: 65 MMHG | TEMPERATURE: 98.3 F | RESPIRATION RATE: 18 BRPM | OXYGEN SATURATION: 95 % | HEART RATE: 79 BPM

## 2022-10-14 LAB
ATRIAL RATE: 82 BPM
ATRIAL RATE: 95 BPM
FLUAV RNA RESP QL NAA+PROBE: NEGATIVE
FLUBV RNA RESP QL NAA+PROBE: NEGATIVE
P AXIS: 0 DEGREES
P AXIS: 38 DEGREES
PR INTERVAL: 130 MS
PR INTERVAL: 146 MS
QRS AXIS: 62 DEGREES
QRS AXIS: 70 DEGREES
QRSD INTERVAL: 78 MS
QRSD INTERVAL: 82 MS
QT INTERVAL: 340 MS
QT INTERVAL: 374 MS
QTC INTERVAL: 427 MS
QTC INTERVAL: 436 MS
RSV RNA RESP QL NAA+PROBE: NEGATIVE
SARS-COV-2 RNA RESP QL NAA+PROBE: NEGATIVE
T WAVE AXIS: 52 DEGREES
T WAVE AXIS: 61 DEGREES
VENTRICULAR RATE: 82 BPM
VENTRICULAR RATE: 95 BPM

## 2022-10-14 PROCEDURE — 0241U HB NFCT DS VIR RESP RNA 4 TRGT: CPT | Performed by: INTERNAL MEDICINE

## 2022-10-14 PROCEDURE — 99239 HOSP IP/OBS DSCHRG MGMT >30: CPT | Performed by: INTERNAL MEDICINE

## 2022-10-14 PROCEDURE — 93005 ELECTROCARDIOGRAM TRACING: CPT

## 2022-10-14 PROCEDURE — 93010 ELECTROCARDIOGRAM REPORT: CPT | Performed by: INTERNAL MEDICINE

## 2022-10-14 RX ORDER — ACETAMINOPHEN 325 MG/1
650 TABLET ORAL EVERY 6 HOURS PRN
Status: CANCELLED | OUTPATIENT
Start: 2022-10-14

## 2022-10-14 RX ORDER — HALOPERIDOL 1 MG/1
2 TABLET ORAL
Status: CANCELLED | OUTPATIENT
Start: 2022-10-14

## 2022-10-14 RX ORDER — HYDROXYZINE 50 MG/1
100 TABLET, FILM COATED ORAL
Status: CANCELLED | OUTPATIENT
Start: 2022-10-14

## 2022-10-14 RX ORDER — ACETAMINOPHEN 325 MG/1
650 TABLET ORAL EVERY 4 HOURS PRN
Status: CANCELLED | OUTPATIENT
Start: 2022-10-14

## 2022-10-14 RX ORDER — LORAZEPAM 2 MG/ML
2 INJECTION INTRAMUSCULAR EVERY 6 HOURS PRN
Status: CANCELLED | OUTPATIENT
Start: 2022-10-14

## 2022-10-14 RX ORDER — DIPHENHYDRAMINE HYDROCHLORIDE 50 MG/ML
50 INJECTION INTRAMUSCULAR; INTRAVENOUS EVERY 6 HOURS PRN
Status: CANCELLED | OUTPATIENT
Start: 2022-10-14

## 2022-10-14 RX ORDER — BENZTROPINE MESYLATE 1 MG/ML
1 INJECTION INTRAMUSCULAR; INTRAVENOUS
Status: CANCELLED | OUTPATIENT
Start: 2022-10-14

## 2022-10-14 RX ORDER — HALOPERIDOL 5 MG/ML
5 INJECTION INTRAMUSCULAR
Status: CANCELLED | OUTPATIENT
Start: 2022-10-14

## 2022-10-14 RX ORDER — PROPRANOLOL HYDROCHLORIDE 20 MG/1
10 TABLET ORAL EVERY 8 HOURS PRN
Status: CANCELLED | OUTPATIENT
Start: 2022-10-14

## 2022-10-14 RX ORDER — LORAZEPAM 2 MG/ML
2 INJECTION INTRAMUSCULAR
Status: CANCELLED | OUTPATIENT
Start: 2022-10-14

## 2022-10-14 RX ORDER — HALOPERIDOL 5 MG/1
5 TABLET ORAL
Status: CANCELLED | OUTPATIENT
Start: 2022-10-14

## 2022-10-14 RX ORDER — MAGNESIUM HYDROXIDE/ALUMINUM HYDROXICE/SIMETHICONE 120; 1200; 1200 MG/30ML; MG/30ML; MG/30ML
30 SUSPENSION ORAL EVERY 4 HOURS PRN
Status: CANCELLED | OUTPATIENT
Start: 2022-10-14

## 2022-10-14 RX ORDER — SULFAMETHOXAZOLE AND TRIMETHOPRIM 800; 160 MG/1; MG/1
1 TABLET ORAL DAILY
Status: DISCONTINUED | OUTPATIENT
Start: 2022-10-14 | End: 2022-10-14

## 2022-10-14 RX ORDER — MINERAL OIL AND PETROLATUM 150; 830 MG/G; MG/G
1 OINTMENT OPHTHALMIC
Status: CANCELLED | OUTPATIENT
Start: 2022-10-14

## 2022-10-14 RX ORDER — LORAZEPAM 2 MG/ML
1 INJECTION INTRAMUSCULAR
Status: CANCELLED | OUTPATIENT
Start: 2022-10-14

## 2022-10-14 RX ORDER — SULFAMETHOXAZOLE AND TRIMETHOPRIM 800; 160 MG/1; MG/1
1 TABLET ORAL EVERY 12 HOURS SCHEDULED
Status: DISCONTINUED | OUTPATIENT
Start: 2022-10-14 | End: 2022-10-14 | Stop reason: HOSPADM

## 2022-10-14 RX ORDER — HALOPERIDOL 5 MG/ML
2.5 INJECTION INTRAMUSCULAR
Status: CANCELLED | OUTPATIENT
Start: 2022-10-14

## 2022-10-14 RX ORDER — SENNOSIDES 8.6 MG
1 TABLET ORAL
Status: CANCELLED | OUTPATIENT
Start: 2022-10-14

## 2022-10-14 RX ORDER — HYDROXYZINE HYDROCHLORIDE 25 MG/1
25 TABLET, FILM COATED ORAL
Status: CANCELLED | OUTPATIENT
Start: 2022-10-14

## 2022-10-14 RX ORDER — HYDROXYZINE 50 MG/1
50 TABLET, FILM COATED ORAL
Status: CANCELLED | OUTPATIENT
Start: 2022-10-14

## 2022-10-14 RX ORDER — LORAZEPAM 2 MG/ML
0.5 INJECTION INTRAMUSCULAR EVERY 4 HOURS PRN
Status: CANCELLED | OUTPATIENT
Start: 2022-10-14

## 2022-10-14 RX ORDER — SULFAMETHOXAZOLE AND TRIMETHOPRIM 800; 160 MG/1; MG/1
1 TABLET ORAL EVERY 12 HOURS SCHEDULED
Status: CANCELLED | OUTPATIENT
Start: 2022-10-14 | End: 2022-10-17

## 2022-10-14 RX ORDER — AMOXICILLIN 250 MG
1 CAPSULE ORAL DAILY PRN
Status: CANCELLED | OUTPATIENT
Start: 2022-10-14

## 2022-10-14 RX ORDER — POLYETHYLENE GLYCOL 3350 17 G/17G
17 POWDER, FOR SOLUTION ORAL DAILY PRN
Status: CANCELLED | OUTPATIENT
Start: 2022-10-14

## 2022-10-14 RX ORDER — LANOLIN ALCOHOL/MO/W.PET/CERES
6 CREAM (GRAM) TOPICAL
Status: CANCELLED | OUTPATIENT
Start: 2022-10-14

## 2022-10-14 RX ORDER — BENZTROPINE MESYLATE 1 MG/1
1 TABLET ORAL
Status: CANCELLED | OUTPATIENT
Start: 2022-10-14

## 2022-10-14 RX ORDER — ACETAMINOPHEN 325 MG/1
975 TABLET ORAL EVERY 6 HOURS PRN
Status: CANCELLED | OUTPATIENT
Start: 2022-10-14

## 2022-10-14 RX ORDER — NICOTINE 21 MG/24HR
14 PATCH, TRANSDERMAL 24 HOURS TRANSDERMAL DAILY
Status: CANCELLED | OUTPATIENT
Start: 2022-10-15

## 2022-10-14 RX ORDER — TRAZODONE HYDROCHLORIDE 50 MG/1
50 TABLET ORAL
Status: CANCELLED | OUTPATIENT
Start: 2022-10-14

## 2022-10-14 RX ORDER — BENZTROPINE MESYLATE 1 MG/ML
0.5 INJECTION INTRAMUSCULAR; INTRAVENOUS
Status: CANCELLED | OUTPATIENT
Start: 2022-10-14

## 2022-10-14 RX ORDER — HALOPERIDOL 5 MG/1
5 TABLET ORAL EVERY 6 HOURS PRN
Status: CANCELLED | OUTPATIENT
Start: 2022-10-14

## 2022-10-14 RX ADMIN — SENNOSIDES 8.6 MG: 8.6 TABLET, FILM COATED ORAL at 13:42

## 2022-10-14 RX ADMIN — CEPHALEXIN 500 MG: 500 CAPSULE ORAL at 09:01

## 2022-10-14 RX ADMIN — CEPHALEXIN 500 MG: 500 CAPSULE ORAL at 13:42

## 2022-10-14 RX ADMIN — SODIUM CHLORIDE, SODIUM GLUCONATE, SODIUM ACETATE, POTASSIUM CHLORIDE, MAGNESIUM CHLORIDE, SODIUM PHOSPHATE, DIBASIC, AND POTASSIUM PHOSPHATE 125 ML/HR: .53; .5; .37; .037; .03; .012; .00082 INJECTION, SOLUTION INTRAVENOUS at 04:00

## 2022-10-14 RX ADMIN — NICOTINE 14 MG: 14 PATCH, EXTENDED RELEASE TRANSDERMAL at 09:01

## 2022-10-14 NOTE — QUICK NOTE
Patient is medically cleared for discharge  His ECG today shows improvement of QTc prolongation  His wound was changed by wound care nurse today and is clear of infection  He is in no acute distress  He seems calmer and not exhibiting paranoia at this time  He is pleasant and knows he is going to inpatient psych unit when a bed is available

## 2022-10-14 NOTE — QUICK NOTE
Brief orthopedics note    Came to bedside to perform wound check/dressing changes  This has already been performed by wound care nurses  Wound images are in the chart  Wounds appear well approximated, sutures intact, without evidence of drainage/erythema or infection  Local wound care was performed by wound care nurse, and wounds were covered with xeroform, gauze, kerlix and ace bandages  Plan:   Recommend suture removal at 14 days - this can be done by primary service  Recommend oral antibiotics, consider Bactrim DS BID x 3 days  Hand surgery will sign off at this time  No need for follow up with hand surgery  Please call with questions or concerns       Ilda Jackson PA-C

## 2022-10-14 NOTE — ASSESSMENT & PLAN NOTE
· Bilateral wrist lacerations repaired in the ER at [address] [place] [address]  · Left wrist laceration had a deep laceration, thus patient was transferred here to [facility] for repair by a hand orthopedic surgeon  · As per discussion with orthopedic surgeon, patient has a clean contaminated wound, thus at risk for infection, and they recommending providing antibiotic with Keflex  Thus this was ordered  · ER physician was informed about providing tetanus shot  · Wound changed by wound care RN  No signs of infection  · Ortho recommends Bactrim for 3 days    Plan:  · Discontinued Keflex  · Bactrim for 3 days  · Pain control  · Local wound care  · Will follow-up further recommendations from orthopedic surgeon

## 2022-10-14 NOTE — PLAN OF CARE
Problem: PAIN - ADULT  Goal: Verbalizes/displays adequate comfort level or baseline comfort level  Description: Interventions:  - Encourage patient to monitor pain and request assistance  - Assess pain using appropriate pain scale  - Administer analgesics based on type and severity of pain and evaluate response  - Implement non-pharmacological measures as appropriate and evaluate response  - Consider cultural and social influences on pain and pain management  - Notify physician/advanced practitioner if interventions unsuccessful or patient reports new pain  Outcome: Progressing     Problem: INFECTION - ADULT  Goal: Absence or prevention of progression during hospitalization  Description: INTERVENTIONS:  - Assess and monitor for signs and symptoms of infection  - Monitor lab/diagnostic results  - Monitor all insertion sites, i e  indwelling lines, tubes, and drains  - Monitor endotracheal if appropriate and nasal secretions for changes in amount and color  - [place] appropriate cooling/warming therapies per order  - Administer medications as ordered  - Instruct and encourage patient and family to use good hand hygiene technique  - Identify and instruct in appropriate isolation precautions for identified infection/condition  Outcome: Progressing  Goal: Absence of fever/infection during neutropenic period  Description: INTERVENTIONS:  - Monitor WBC    Outcome: Progressing     Problem: SAFETY ADULT  Goal: Patient will remain free of falls  Description: INTERVENTIONS:  - Educate patient/family on patient safety including physical limitations  - Instruct patient to call for assistance with activity   - Consult OT/PT to assist with strengthening/mobility   - Keep Call bell within reach  - Keep bed low and locked with side rails adjusted as appropriate  - Keep care items and personal belongings within reach  - Initiate and maintain comfort rounds  - Make Fall Risk Sign visible to staff  - Offer Toileting every  Hours, in advance of need  - Initiate/Maintain alarm  - Obtain necessary fall risk management equipment:   - Apply yellow socks and bracelet for high fall risk patients  - Consider moving patient to room near nurses station  Outcome: Progressing  Goal: Maintain or return to baseline ADL function  Description: INTERVENTIONS:  -  Assess patient's ability to carry out ADLs; assess patient's baseline for ADL function and identify physical deficits which impact ability to perform ADLs (bathing, care of mouth/teeth, toileting, grooming, dressing, etc )  - Assess/evaluate cause of self-care deficits   - Assess range of motion  - Assess patient's mobility; develop plan if impaired  - Assess patient's need for assistive devices and provide as appropriate  - Encourage maximum independence but intervene and supervise when necessary  - Involve family in performance of ADLs  - Assess for home care needs following discharge   - Consider OT consult to assist with ADL evaluation and planning for discharge  - Provide patient education as appropriate  Outcome: Progressing  Goal: Maintains/Returns to pre admission functional level  Description: INTERVENTIONS:  - Perform BMAT or MOVE assessment daily    - Set and communicate daily mobility goal to care team and patient/family/caregiver  - Collaborate with rehabilitation services on mobility goals if consulted  - Perform Range of Motion  times a day  - Reposition patient every  hours    - Dangle patient  times a day  - Stand patient  times a day  - Ambulate patient  times a day  - Out of bed to chair  times a day   - Out of bed for meal times a day  - Out of bed for toileting  - Record patient progress and toleration of activity level   Outcome: Progressing     Problem: DISCHARGE PLANNING  Goal: Discharge to home or other facility with appropriate resources  Description: INTERVENTIONS:  - Identify barriers to discharge w/patient and caregiver  - Arrange for needed discharge resources and transportation as appropriate  - Identify discharge learning needs (meds, wound care, etc )  - Arrange for interpretive services to assist at discharge as needed  - Refer to Case Management Department for coordinating discharge planning if the patient needs post-hospital services based on physician/advanced practitioner order or complex needs related to functional status, cognitive ability, or social support system  Outcome: Progressing     Problem: Knowledge Deficit  Goal: Patient/family/caregiver demonstrates understanding of disease process, treatment plan, medications, and discharge instructions  Description: Complete learning assessment and assess knowledge base    Interventions:  - Provide teaching at level of understanding  - Provide teaching via preferred learning methods  Outcome: Progressing

## 2022-10-14 NOTE — DISCHARGE INSTR - AVS FIRST PAGE
Dear Abimael Esparza,     It was our pleasure to care for you here at [facility]  It is our hope that we were always able to exceed the expected standards for your care during your stay  You were hospitalized due to benadryl ingestion  You were cared for on the second floor by Anshu Agrawal under the service of Sondra Calvillo MD with the [facility] who covers for your primary care physician (PCP), No primary care provider on file  , while you were hospitalized  If you have any questions or concerns related to this hospitalization, you may contact us at [phone]  For follow up as well as any medication refills, we recommend that you follow up with your primary care physician  A registered nurse will reach out to you by phone within a few days after your discharge to answer any additional questions that you may have after going home  However, at this time we provide for you here, the most important instructions / recommendations at discharge:     Notable Medication Adjustments -   ***  Testing Required after Discharge -   ***  Important follow up information -   ***  Other Instructions -   1-Cleanse b/l arm wounds with normal saline, apply xeroform gauze over suture lines, cover with 1/2 ABD, secure with alejandro and tape  ACE wrap to secure and lightly wrapped for compression  Change every other day and PRN   2-Elevate heels to offload pressure  3-Ehob cushion in chair when out of bed  4-Moisturize skin daily with skin nourishing cream    5-Turn/reposition q2h or when medically stable for pressure re-distribution on skin  Suture removal 10/26  Please review this entire after visit summary as additional general instructions including medication list, appointments, activity, diet, any pertinent wound care, and other additional recommendations from your care team that may be provided for you        Sincerely,     Anshu Agrawal

## 2022-10-14 NOTE — DISCHARGE INSTR - OTHER ORDERS
Skin care plans:  1-Cleanse b/l arm wounds with normal saline, apply xeroform gauze over suture lines, cover with 1/2 ABD, secure with alejandro and tape  ACE wrap to secure and lightly wrapped for compression  Change every other day and PRN   2-Elevate heels to offload pressure  3-Ehob cushion in chair when out of bed  4-Moisturize skin daily with skin nourishing cream   5-Turn/reposition q2h or when medically stable for pressure re-distribution on skin          Per ortho team, follow up with primary team in 2 weeks for suture removal

## 2022-10-14 NOTE — WOUND OSTOMY CARE
Consult Note - Wound   Neli Means 33 y o  male MRN: [id number]  Unit/Bed#: S -01 Encounter: [id number]        History and Present Illness:  Wound care consulted on 33 y o male [facility] after he overdosed on Benadryl and lacerated his own wrists  Wrists laceration repaired by hand surgery who had wound care assess as well  Patient is in room on 1:1 and independent with all ADLs  Patient has history of PTSD  Assessment Findings:     1  Laceration right arm- wound is full thickness as the whole laceration could not be pulled closed, sutures are well approximated and intact  Small amount of sanguineous drainage present  2  Laceration left arm- this wound is sutured entirely and they are well approximated, some edema present in the arm, small amount of sanguineous drainage  Ortho team met outside of room and communicated that sutures can be removed in 2 weeks by primary team      No induration, fluctuance, odor, warmth/temperature differences, redness, or purulence noted to the above noted wounds and skin areas assessed  New dressings applied per orders listed below  Patient tolerated well- no s/s of non-verbal pain or discomfort observed during the encounter  Bedside nurse aware of plan of care  See flow sheets for more detailed assessment findings  Wound care will sign off  Skin care plans:   1-Cleanse b/l arm wounds with normal saline, apply xeroform gauze over suture lines, cover with 1/2 ABD, secure with alejandro and tape  ACE wrap to secure and lightly wrapped for compression  Change every other day and PRN   2-Elevate heels to offload pressure  3-Ehob cushion in chair when out of bed     4-Moisturize skin daily with skin nourishing cream    5-Turn/reposition q2h or when medically stable for pressure re-distribution on skin         Wounds:  Wound 10/12/22 Laceration Arm Right (Active)   Wound Image   10/14/22 7952   Wound Description Epithelialization;Granulation tissue 10/14/22 0847   Oumou-wound Assessment Clean;Dry; Intact;Edema 10/14/22 0847   Wound Length (cm) 5 2 cm 10/14/22 0847   Wound Width (cm) 0 3 cm 10/14/22 0847   Wound Depth (cm) 0 3 cm 10/14/22 0847   Wound Surface Area (cm^2) 1 56 cm^2 10/14/22 0847   Wound Volume (cm^3) 0 468 cm^3 10/14/22 0847   Calculated Wound Volume (cm^3) 0 47 cm^3 10/14/22 0847   Tunneling 0 cm 10/14/22 0847   Tunneling in depth located at 0 10/14/22 0847   Undermining 0 10/14/22 0847   Undermining is depth extending from 0 10/14/22 0847   Wound Site Closure Sutures 10/14/22 0847   Drainage Amount Small 10/14/22 0847   Drainage Description Sanguineous 10/14/22 0847   Non-staged Wound Description Full thickness 10/14/22 0847   Treatments Cleansed 10/14/22 0847   Dressing ABD;Dry dressing;Xeroform 10/14/22 0847   Wound packed? No 10/14/22 0847   Packing- # removed 0 10/14/22 0847   Packing- # inserted 0 10/14/22 0847   Dressing Changed New 10/14/22 0847   Patient Tolerance Tolerated well 10/14/22 0847   Dressing Status Clean;Dry; Intact 10/14/22 0847       Wound 10/12/22 Other (comment) Arm Anterior;Distal;Left (Active)   Wound Image   10/14/22 0848   Wound Description Epithelialization 10/14/22 0848   Oumou-wound Assessment Clean;Dry; Intact;Edema 10/14/22 0848   Wound Length (cm) 6 5 cm 10/14/22 0848   Wound Width (cm) 0 2 cm 10/14/22 0848   Wound Depth (cm) 0 cm 10/14/22 0848   Wound Surface Area (cm^2) 1 3 cm^2 10/14/22 0848   Wound Volume (cm^3) 0 cm^3 10/14/22 0848   Calculated Wound Volume (cm^3) 0 cm^3 10/14/22 0848   Tunneling 0 cm 10/14/22 0848   Tunneling in depth located at 0 10/14/22 0848   Undermining 0 10/14/22 0848   Undermining is depth extending from 0 10/14/22 0848   Wound Site Closure Sutures 10/14/22 0848   Drainage Amount Small 10/14/22 0848   Drainage Description Sanguineous 10/14/22 0848   Non-staged Wound Description Not applicable 41/29/87 6554   Treatments Cleansed 10/14/22 0848   Dressing ABD;Dry dressing;Xeroform 10/14/22 0848 Wound packed? No 10/14/22 0848   Packing- # removed 0 10/14/22 0848   Packing- # inserted 0 10/14/22 0848   Dressing Changed New 10/14/22 0848   Patient Tolerance Tolerated well 10/14/22 0848   Dressing Status Clean;Dry; Intact 10/14/22 0848         Jolene MOHANN, RN, [employer]

## 2022-10-14 NOTE — ASSESSMENT & PLAN NOTE
· Patient able to tell us that he ingested 3/4 of a bottle of Benadryl  Patient stated that this was intentional along with cutting of his wrists bilaterally  · Per discussion with the patient's parents, patient has history of PTSD and recently had been sad about his dog, Radha, who is 25years old who is scheduled to be taken down  · Patient developed signs and symptoms of anticholinergic toxicity with the Benadryl overdose:  Patient had agitation, delirium and urinary retention  Bladder catheterization was done which resulted in 1 5 L of urine drained from the bladder  Patient required couple of doses of Versed for agitation  · EKG showed prolonged QTC interval at 444 milliseconds  · Repeat ECG on 10/14 showed improvement of QTc interval   · Abdomen pelvic CT showed bladder distention  · Patient currently displaying paranoia, overtly aggressiveness, danger to self possibly danger to others  · Psychiatry has deemed him appropriate for 302 --involuntary hospitalization    Plan:  · As per , give Ativan p r n   For agitation  · Will discontinue telemetry  · Patient medically cleared for discharge and is waiting for a bed at inpatient psych unit

## 2022-10-14 NOTE — CASE MANAGEMENT
Case Management Discharge Planning Note    Patient name Starr [facility] /S -01 MRN [id number]  : 1990 Date 10/14/2022       Current Admission Date: 10/12/2022  Current Admission Diagnosis:Overdose with Benadryl; with anticholinergic toxidrome   Patient Active Problem List    Diagnosis Date Noted   • Wrist laceration 10/12/2022   • PTSD (post-traumatic stress disorder) 10/12/2022   • Overdose with Benadryl; with anticholinergic toxidrome 10/12/2022   • Leukocytosis 10/12/2022   • Elevated CK 10/12/2022      LOS (days): 2  Geometric Mean LOS (GMLOS) (days): 2 50  Days to GMLOS:0 3     OBJECTIVE:  Risk of Unplanned Readmission Score: 8 58         Current admission status: Inpatient   Preferred Pharmacy:   PATIENT/FAMILY REPORTS NO PREFERRED PHARMACY  No address on file      Primary Care Provider: No primary care provider on file  Primary Insurance: [employer]  Secondary Insurance:     DISCHARGE DETAILS:    Discharge planning discussed with[de-identified] Patient                                     Other Referral/Resources/Interventions Provided:  Interventions: Inpatient Behavioral Health         Treatment Team Recommendation: Inpatient Behavioral Health  Discharge Destination Plan[de-identified] Inpatient Behavioral Health  Transport at Discharge : [facility], [address]  Dispatcher Contacted: Yes     Transported by [employer] and Unit #): SLETS  ETA of Transport (Date): 10/14/22  ETA of Transport (Time): 2100                            Accepting Facility Name, [address] [employer]  Receiving Facility/Agency Phone Number: [phone]        WENDY received a call back from [address] [place], [place] [address]  at [facility] [phone]  WENDY completed prior auth request and and was provided with prior authorization information  WENDY was told that nurse reviewer Shai Bain would call then to complete the live review and at that time they would provide the exact covered dates      WENDY received a call from [employer] and completed the live review for IP BH placement  Prior Auth# [id number]  Admit 10/14   Next review due 10/18  Reviewer: Joaquín [employer]  (P) [phone]  (F) [phone]    CM updated Crisis Intake team with auth being received and transportation requested for after 5:00 PM at their direction  Patient needs a COVID test and this order was requested from [facility]  CM heard back with a 9:00 BLS transport time with [facility]  SLIM, primary nurse, Crisis intake and patient all aware of DCP  CM called patient's father and left a message for him which facility patient was being transferred to  No further CM DC needs were discussed or identified

## 2022-10-14 NOTE — ASSESSMENT & PLAN NOTE
· Likely stress reactive  · No other signs and symptoms of an active infection  · Started on Keflex for prophylaxis as he is at risk for having infection from wrist laceration  · Wound changed by wound care RN  No signs of infection    · Ortho is recommending Bactrim for the wrist     Plan:  · Discontinue Keflex  · Started Bactrim for 3 days

## 2022-10-14 NOTE — ASSESSMENT & PLAN NOTE
· Bilateral wrist lacerations repaired in the ER at [address] [place] [address]  · Left wrist laceration had a deep laceration, thus [facility] by a hand orthopedic surgeon  · As per discussion with orthopedic surgeon, patient has a clean contaminated wound, thus at risk for infection, and they recommending providing antibiotic with Keflex  Thus this was ordered  · ER physician was informed about providing tetanus shot  · Wound changed by wound care RN  No signs of infection  · Ortho recommends Bactrim for 3 days    Plan:  · Discontinued Keflex  · Bactrim for 3 days  · Pain control  · Local wound care  · Will follow-up further recommendations from orthopedic surgeon

## 2022-10-14 NOTE — CASE MANAGEMENT
Case Management Discharge Planning Note    Patient name Vickie Petit  Location S /S -01 MRN [id number]  : 1990 Date 10/14/2022       Current Admission Date: 10/12/2022  Current Admission Diagnosis:Overdose with Benadryl; with anticholinergic toxidrome   Patient Active Problem List    Diagnosis Date Noted   • Wrist laceration 10/12/2022   • PTSD (post-traumatic stress disorder) 10/12/2022   • Overdose with Benadryl; with anticholinergic toxidrome 10/12/2022   • Leukocytosis 10/12/2022   • Elevated CK 10/12/2022      LOS (days): 2  Geometric Mean LOS (GMLOS) (days): 2 50  Days to GMLOS:0 3     OBJECTIVE:  Risk of Unplanned Readmission Score: 8 58         Current admission status: Inpatient   Preferred Pharmacy:   PATIENT/FAMILY REPORTS NO PREFERRED PHARMACY  No address on file      Primary Care Provider: No primary care provider on file  Primary Insurance: [employer]  Secondary Insurance:     DISCHARGE DETAILS:    Discharge planning discussed with[de-identified] Patient                                     Other Referral/Resources/Interventions Provided:  Interventions: Inpatient Behavioral Health         Treatment Team Recommendation: Inpatient Behavioral Health  Discharge Destination Plan[de-identified] Inpatient Behavioral Health  Transport at Discharge : [facility], [address]  Dispatcher Contacted: Yes     Transported by [employer] and Unit #): SLETS  ETA of Transport (Date): 10/14/22  ETA of Transport (Time): 2100                            Accepting Facility Name, [address] : [address] [employer]  Receiving Facility/Agency Phone Number: [phone]        WENDY received a call back from [facility] Nabila Cates at [facility] [phone]  WENDY completed prior auth request and and was provided with prior authorization information  WENDY was told that nurse reviewer DAVION would call then to complete the live review and at that time they would provide the exact covered dates      WENDY received a call from Prince Machado and completed the live review for IP BH placement  Prior Auth# [id number]  Admit 10/14   Next review due 10/18  Reviewer: Prince Machado  (P) [phone]  (F) [phone]    CM updated Crisis Intake team with auth being received and transportation requested for after 5:00 PM at their direction  Patient needs a COVID test and this order was requested from [employer]  CM heard back with a 9:00 BLS transport time with [facility]  SLIM, primary nurse, Crisis intake and patient all aware of DCP  CM called patient's father and left a message for him which facility patient was being transferred to  No further CM DC needs were discussed or identified

## 2022-10-14 NOTE — QUICK NOTE
Talked to Hernandez Bal, patient's father  Told him patient is being d/c today to inpatient psych unit  Answered all questions/concerns

## 2022-10-14 NOTE — CASE MANAGEMENT
Case Management Discharge Planning Note    Patient name Marco Antonio Elizondo  Location S /S -01 MRN [id number]  : 1990 Date 10/14/2022       Current Admission Date: 10/12/2022  Current Admission Diagnosis:Overdose with Benadryl; with anticholinergic toxidrome   Patient Active Problem List    Diagnosis Date Noted   • Wrist laceration 10/12/2022   • PTSD (post-traumatic stress disorder) 10/12/2022   • Overdose with Benadryl; with anticholinergic toxidrome 10/12/2022   • Leukocytosis 10/12/2022   • Elevated CK 10/12/2022      LOS (days): 2  Geometric Mean LOS (GMLOS) (days): 2 50  Days to GMLOS:0 4     OBJECTIVE:  Risk of Unplanned Readmission Score: 8 46         Current admission status: Inpatient   Preferred Pharmacy:   PATIENT/FAMILY REPORTS NO PREFERRED PHARMACY  No address on file      Primary Care Provider: No primary care provider on file  Primary Insurance: [employer]  Secondary Insurance:     DISCHARGE DETAILS:                                          Other Referral/Resources/Interventions Provided:  Interventions: Inpatient Behavioral Health  Referral Comments: CM was notified by  Crisis Intake that patient is being reviewed for a bed at [facility]  CM requested a COVID test be ordered from [address] [place] [address]  Treatment Team Recommendation: Inpatient Behavioral Health  Discharge Destination Plan[de-identified] Inpatient Behavioral Health  Transport at Discharge : [address]                                         CM called patient's insurance [facility] [phone] to initiate pre-cert  CM was transferred to the [address] line and was able to leave a detailed message requesting a call back  CM department will continue to follow to assist with discharge coordination

## 2022-10-14 NOTE — CASE MANAGEMENT
Case Management Discharge Planning Note    Patient name Jami Thomson  Location S /S -01 MRN [id number]  : 1990 Date 10/14/2022       Current Admission Date: 10/12/2022  Current Admission Diagnosis:Overdose with Benadryl; with anticholinergic toxidrome   Patient Active Problem List    Diagnosis Date Noted   • Wrist laceration 10/12/2022   • PTSD (post-traumatic stress disorder) 10/12/2022   • Overdose with Benadryl; with anticholinergic toxidrome 10/12/2022   • Leukocytosis 10/12/2022   • Elevated CK 10/12/2022      LOS (days): 2  Geometric Mean LOS (GMLOS) (days): 2 50  Days to GMLOS:0 5     OBJECTIVE:  Risk of Unplanned Readmission Score: 8 46         Current admission status: Inpatient   Preferred Pharmacy:   PATIENT/FAMILY REPORTS NO PREFERRED PHARMACY  No address on file      Primary Care Provider: No primary care provider on file  Primary Insurance: [employer]  Secondary Insurance:     DISCHARGE DETAILS:                                          Other Referral/Resources/Interventions Provided:  Interventions: Inpatient Behavioral Health  Referral Comments: CM reached out to [facility] team to check on bed availability for patient  CM was told that they will have a better idea on bed availablilty after 10:30 AM   CM will follow back up with them after this time  CM department will continue to follow to assist with discharge coordination

## 2022-10-14 NOTE — PROGRESS NOTES
[facility]  Progress Note Fawad Faulkner 1990, 33 y o  male MRN: [id number]  Unit/Bed#: S -01 Encounter: [id number]  Primary Care Provider: No primary care provider on file  Date and time admitted to hospital: 10/12/2022  7:28 AM    * Overdose with Benadryl; with anticholinergic toxidrome  Assessment & Plan  · Patient able to tell us that he ingested 3/4 of a bottle of Benadryl  Patient stated that this was intentional along with cutting of his wrists bilaterally  · Per discussion with the patient's parents, patient has history of PTSD and recently had been sad about his dog, Honey, who is 25years old who is scheduled to be taken down  · Patient developed signs and symptoms of anticholinergic toxicity with the Benadryl overdose:  Patient had agitation, delirium and urinary retention  Bladder catheterization was done which resulted in 1 5 L of urine drained from the bladder  Patient required couple of doses of Versed for agitation  · EKG showed prolonged QTC interval at 444 milliseconds  · Repeat ECG on 10/14 showed improvement of QTc interval   · Abdomen pelvic CT showed bladder distention  · Patient currently displaying paranoia, overtly aggressiveness, danger to self possibly danger to others  · Psychiatry has deemed him appropriate for 302 --involuntary hospitalization    Plan:  · As per , give Ativan p r n  For agitation  · Will discontinue telemetry  · Patient medically cleared for discharge and is waiting for a bed at inpatient psych unit    PTSD (post-traumatic stress disorder)  Assessment & Plan  · Psychiatrist consulted, patient refused to talk to them  They deemed him appropriate for involuntary hospitalization  Patient is displaying paranoia and aggressiveness, danger to self, possibly danger to others      Plan:  Continue one-to-one observation  Controlled team    Wrist laceration  Assessment & Plan  · Bilateral wrist lacerations repaired in the ER at [facility]  · Left wrist laceration had a deep laceration, thus patient was transferred here to [facility] for repair by a hand orthopedic surgeon  · As per discussion with orthopedic surgeon, patient has a clean contaminated wound, thus at risk for infection, and they recommending providing antibiotic with Keflex  Thus this was ordered  · ER physician was informed about providing tetanus shot  · Wound changed by wound care RN  No signs of infection  · Ortho recommends Bactrim for 3 days    Plan:  · Discontinued Keflex  · Bactrim for 3 days  · Pain control  · Local wound care  · Will follow-up further recommendations from orthopedic surgeon  Elevated CK  Assessment & Plan  · Mild  Four hundred ten, has come down to 320  Slowly resolving  Plan:  · Monitor  · IV fluids  · For further evaluation and management if worsens significantly  Leukocytosis  Assessment & Plan  · Likely stress reactive  · No other signs and symptoms of an active infection  · Started on Keflex for prophylaxis as he is at risk for having infection from wrist laceration  · Wound changed by wound care RN  No signs of infection  · Ortho is recommending Bactrim for the wrist     Plan:  · Discontinue Keflex  · Started Bactrim for 3 days        VTE Pharmacologic Prophylaxis: VTE Score: 0 Low Risk (Score 0-2) - Encourage Ambulation  Patient Centered Rounds: I performed bedside rounds with nursing staff today  Discussions with Specialists or Other Care Team Provider: Ortho    Education and Discussions with Family / Patient: Updated  (father) at bedside  Current Length of Stay: 2 day(s)  Current Patient Status: Inpatient   Discharge Plan: Anticipate discharge tomorrow to inpatient psych  Code Status: Level 1 - Full Code    Subjective:   Patient seen and examined by me  He is in no acute distress  He asked to shower  He is pleasant today   No acute complaints  Objective:     Vitals:   Temp (24hrs), Av °F (36 7 °C), Min:97 8 °F (36 6 °C), Max:98 2 °F (36 8 °C)    Temp:  [97 8 °F (36 6 °C)-98 2 °F (36 8 °C)] 98 2 °F (36 8 °C)  HR:  [60-90] 90  Resp:  [16-19] 18  BP: (119-163)/(68-82) 140/73  SpO2:  [95 %-100 %] 98 %  There is no height or weight on file to calculate BMI  Input and Output Summary (last 24 hours): Intake/Output Summary (Last 24 hours) at 10/14/2022 1516  Last data filed at 10/14/2022 1306  Gross per 24 hour   Intake 3364 58 ml   Output 925 ml   Net 2439 58 ml       Physical Exam:   Physical Exam  Constitutional:       General: He is not in acute distress  Appearance: Normal appearance  He is normal weight  He is not ill-appearing or diaphoretic  HENT:      Head: Normocephalic and atraumatic  Mouth/Throat:      Mouth: Mucous membranes are moist    Eyes:      General: No scleral icterus  Extraocular Movements: Extraocular movements intact  Conjunctiva/sclera: Conjunctivae normal       Pupils: Pupils are equal, round, and reactive to light  Cardiovascular:      Rate and Rhythm: Normal rate and regular rhythm  Pulses: Normal pulses  Heart sounds: Normal heart sounds  No murmur heard  No friction rub  No gallop  Pulmonary:      Effort: Pulmonary effort is normal  No respiratory distress  Breath sounds: Normal breath sounds  No wheezing or rales  Abdominal:      General: Abdomen is flat  Bowel sounds are normal  There is no distension  Palpations: Abdomen is soft  Tenderness: There is no abdominal tenderness  There is no guarding  Musculoskeletal:      Right lower leg: No edema  Left lower leg: No edema  Skin:     General: Skin is warm  Capillary Refill: Capillary refill takes less than 2 seconds  Neurological:      Mental Status: He is alert and oriented to person, place, and time     Psychiatric:         Mood and Affect: Mood normal          Behavior: Behavior normal  Additional Data:     Labs:  Results from last 7 days   Lab Units 10/13/22  0524   WBC Thousand/uL 9 63   HEMOGLOBIN g/dL 14 8   HEMATOCRIT % 44 3   PLATELETS Thousands/uL 201   NEUTROS PCT % 70   LYMPHS PCT % 19   MONOS PCT % 10   EOS PCT % 1     Results from last 7 days   Lab Units 10/13/22  0524 10/12/22  0902   SODIUM mmol/L 139 139   POTASSIUM mmol/L 4 1 4 0   CHLORIDE mmol/L 104 105   CO2 mmol/L 27 27   BUN mg/dL 6 6   CREATININE mg/dL 0 62 0 77   ANION GAP mmol/L 8 7   CALCIUM mg/dL 9 4 9 7   ALBUMIN g/dL  --  4 8   TOTAL BILIRUBIN mg/dL  --  0 64   ALK PHOS U/L  --  37   ALT U/L  --  16   AST U/L  --  17   GLUCOSE RANDOM mg/dL 97 82                       Lines/Drains:  Invasive Devices  Report    Peripheral Intravenous Line  Duration           Peripheral IV 10/13/22 Left;Proximal;Ventral (anterior) Forearm <1 day                      Imaging: Reviewed radiology reports from this admission including: chest xray    Recent Cultures (last 7 days):         Last 24 Hours Medication List:   Current Facility-Administered Medications   Medication Dose Route Frequency Provider Last Rate   • acetaminophen  650 mg Oral Q6H PRN Alpiniano Corinna Goodell, MD     • haloperidol  5 mg Oral Q6H PRN Dale Hunter DO     • LORazepam  0 5 mg Intravenous Q4H PRN Alpiniano Corinna Goodell, MD     • melatonin  6 mg Oral HS Alyce Cardozo PA-C     • nicotine  14 mg Transdermal Daily Norma Chaparro MD     • psyllium  1 packet Oral Daily Adeliada Quevedo MD     • senna  1 tablet Oral HS PRN Wallie Alpers Pintor, MD     • sulfamethoxazole-trimethoprim  1 tablet Oral Q12H DO Katelyn          Today, Patient Was Seen By: Adelaida Quevedo    **Please Note: This note may have been constructed using a voice recognition system  **

## 2022-10-15 ENCOUNTER — HOSPITAL ENCOUNTER (INPATIENT)
Facility: HOSPITAL | Age: 32
LOS: 11 days | DRG: 751 | End: 2022-10-26
Attending: EMERGENCY MEDICINE | Admitting: STUDENT IN AN ORGANIZED HEALTH CARE EDUCATION/TRAINING PROGRAM
Payer: COMMERCIAL

## 2022-10-15 DIAGNOSIS — F32.A DEPRESSION: Primary | ICD-10-CM

## 2022-10-15 DIAGNOSIS — F33.2 SEVERE EPISODE OF RECURRENT MAJOR DEPRESSIVE DISORDER, WITHOUT PSYCHOTIC FEATURES (HCC): ICD-10-CM

## 2022-10-15 DIAGNOSIS — Z00.8 MEDICAL CLEARANCE FOR PSYCHIATRIC ADMISSION: ICD-10-CM

## 2022-10-15 DIAGNOSIS — U07.1 COVID-19: ICD-10-CM

## 2022-10-15 LAB
AMPHETAMINES SERPL QL SCN: NEGATIVE
BARBITURATES UR QL: NEGATIVE
BENZODIAZ UR QL: NEGATIVE
COCAINE UR QL: NEGATIVE
ETHANOL EXG-MCNC: 0 MG/DL
FLUAV RNA RESP QL NAA+PROBE: NEGATIVE
FLUBV RNA RESP QL NAA+PROBE: NEGATIVE
METHADONE UR QL: NEGATIVE
OPIATES UR QL SCN: NEGATIVE
OXYCODONE+OXYMORPHONE UR QL SCN: NEGATIVE
PCP UR QL: NEGATIVE
RSV RNA RESP QL NAA+PROBE: NEGATIVE
SARS-COV-2 RNA RESP QL NAA+PROBE: POSITIVE
THC UR QL: POSITIVE

## 2022-10-15 PROCEDURE — 80061 LIPID PANEL: CPT | Performed by: STUDENT IN AN ORGANIZED HEALTH CARE EDUCATION/TRAINING PROGRAM

## 2022-10-15 PROCEDURE — 86592 SYPHILIS TEST NON-TREP QUAL: CPT | Performed by: STUDENT IN AN ORGANIZED HEALTH CARE EDUCATION/TRAINING PROGRAM

## 2022-10-15 PROCEDURE — 82075 ASSAY OF BREATH ETHANOL: CPT | Performed by: EMERGENCY MEDICINE

## 2022-10-15 PROCEDURE — 93005 ELECTROCARDIOGRAM TRACING: CPT

## 2022-10-15 PROCEDURE — 99285 EMERGENCY DEPT VISIT HI MDM: CPT

## 2022-10-15 PROCEDURE — 99285 EMERGENCY DEPT VISIT HI MDM: CPT | Performed by: EMERGENCY MEDICINE

## 2022-10-15 PROCEDURE — 0241U HB NFCT DS VIR RESP RNA 4 TRGT: CPT | Performed by: EMERGENCY MEDICINE

## 2022-10-15 PROCEDURE — 82306 VITAMIN D 25 HYDROXY: CPT | Performed by: STUDENT IN AN ORGANIZED HEALTH CARE EDUCATION/TRAINING PROGRAM

## 2022-10-15 PROCEDURE — 80053 COMPREHEN METABOLIC PANEL: CPT | Performed by: STUDENT IN AN ORGANIZED HEALTH CARE EDUCATION/TRAINING PROGRAM

## 2022-10-15 PROCEDURE — 82607 VITAMIN B-12: CPT | Performed by: STUDENT IN AN ORGANIZED HEALTH CARE EDUCATION/TRAINING PROGRAM

## 2022-10-15 PROCEDURE — 85025 COMPLETE CBC W/AUTO DIFF WBC: CPT | Performed by: STUDENT IN AN ORGANIZED HEALTH CARE EDUCATION/TRAINING PROGRAM

## 2022-10-15 PROCEDURE — 80307 DRUG TEST PRSMV CHEM ANLYZR: CPT | Performed by: EMERGENCY MEDICINE

## 2022-10-15 PROCEDURE — 84443 ASSAY THYROID STIM HORMONE: CPT | Performed by: STUDENT IN AN ORGANIZED HEALTH CARE EDUCATION/TRAINING PROGRAM

## 2022-10-15 PROCEDURE — 82746 ASSAY OF FOLIC ACID SERUM: CPT | Performed by: STUDENT IN AN ORGANIZED HEALTH CARE EDUCATION/TRAINING PROGRAM

## 2022-10-15 RX ORDER — DIPHENHYDRAMINE HYDROCHLORIDE 50 MG/ML
50 INJECTION INTRAMUSCULAR; INTRAVENOUS EVERY 6 HOURS PRN
Status: DISCONTINUED | OUTPATIENT
Start: 2022-10-15 | End: 2022-10-27 | Stop reason: HOSPADM

## 2022-10-15 RX ORDER — HYDROXYZINE HYDROCHLORIDE 25 MG/1
25 TABLET, FILM COATED ORAL
Status: DISCONTINUED | OUTPATIENT
Start: 2022-10-15 | End: 2022-10-27 | Stop reason: HOSPADM

## 2022-10-15 RX ORDER — ACETAMINOPHEN 325 MG/1
650 TABLET ORAL EVERY 6 HOURS PRN
Status: DISCONTINUED | OUTPATIENT
Start: 2022-10-15 | End: 2022-10-21

## 2022-10-15 RX ORDER — ACETAMINOPHEN 325 MG/1
975 TABLET ORAL EVERY 6 HOURS PRN
Status: DISCONTINUED | OUTPATIENT
Start: 2022-10-15 | End: 2022-10-21

## 2022-10-15 RX ORDER — BENZTROPINE MESYLATE 1 MG/1
1 TABLET ORAL
Status: DISCONTINUED | OUTPATIENT
Start: 2022-10-15 | End: 2022-10-27 | Stop reason: HOSPADM

## 2022-10-15 RX ORDER — LORAZEPAM 2 MG/ML
1 INJECTION INTRAMUSCULAR
Status: DISCONTINUED | OUTPATIENT
Start: 2022-10-15 | End: 2022-10-27 | Stop reason: HOSPADM

## 2022-10-15 RX ORDER — MAGNESIUM HYDROXIDE/ALUMINUM HYDROXICE/SIMETHICONE 120; 1200; 1200 MG/30ML; MG/30ML; MG/30ML
30 SUSPENSION ORAL EVERY 4 HOURS PRN
Status: DISCONTINUED | OUTPATIENT
Start: 2022-10-15 | End: 2022-10-27 | Stop reason: HOSPADM

## 2022-10-15 RX ORDER — ACETAMINOPHEN 325 MG/1
650 TABLET ORAL EVERY 4 HOURS PRN
Status: DISCONTINUED | OUTPATIENT
Start: 2022-10-15 | End: 2022-10-21

## 2022-10-15 RX ORDER — HALOPERIDOL 5 MG/ML
2.5 INJECTION INTRAMUSCULAR
Status: DISCONTINUED | OUTPATIENT
Start: 2022-10-15 | End: 2022-10-27 | Stop reason: HOSPADM

## 2022-10-15 RX ORDER — HYDROXYZINE 50 MG/1
50 TABLET, FILM COATED ORAL
Status: DISCONTINUED | OUTPATIENT
Start: 2022-10-15 | End: 2022-10-27 | Stop reason: HOSPADM

## 2022-10-15 RX ORDER — HALOPERIDOL 5 MG/1
5 TABLET ORAL
Status: DISCONTINUED | OUTPATIENT
Start: 2022-10-15 | End: 2022-10-27 | Stop reason: HOSPADM

## 2022-10-15 RX ORDER — TRAZODONE HYDROCHLORIDE 50 MG/1
50 TABLET ORAL
Status: DISCONTINUED | OUTPATIENT
Start: 2022-10-15 | End: 2022-10-21

## 2022-10-15 RX ORDER — LORAZEPAM 2 MG/ML
2 INJECTION INTRAMUSCULAR EVERY 6 HOURS PRN
Status: DISCONTINUED | OUTPATIENT
Start: 2022-10-15 | End: 2022-10-27 | Stop reason: HOSPADM

## 2022-10-15 RX ORDER — NICOTINE 21 MG/24HR
14 PATCH, TRANSDERMAL 24 HOURS TRANSDERMAL DAILY
Status: DISCONTINUED | OUTPATIENT
Start: 2022-10-16 | End: 2022-10-27 | Stop reason: HOSPADM

## 2022-10-15 RX ORDER — BENZTROPINE MESYLATE 1 MG/ML
0.5 INJECTION INTRAMUSCULAR; INTRAVENOUS
Status: DISCONTINUED | OUTPATIENT
Start: 2022-10-15 | End: 2022-10-27 | Stop reason: HOSPADM

## 2022-10-15 RX ORDER — HALOPERIDOL 1 MG/1
2 TABLET ORAL
Status: DISCONTINUED | OUTPATIENT
Start: 2022-10-15 | End: 2022-10-27 | Stop reason: HOSPADM

## 2022-10-15 RX ORDER — LORAZEPAM 2 MG/ML
2 INJECTION INTRAMUSCULAR
Status: DISCONTINUED | OUTPATIENT
Start: 2022-10-15 | End: 2022-10-27 | Stop reason: HOSPADM

## 2022-10-15 RX ORDER — LANOLIN ALCOHOL/MO/W.PET/CERES
6 CREAM (GRAM) TOPICAL
Status: DISCONTINUED | OUTPATIENT
Start: 2022-10-15 | End: 2022-10-21

## 2022-10-15 RX ORDER — AMOXICILLIN 250 MG
1 CAPSULE ORAL DAILY PRN
Status: DISCONTINUED | OUTPATIENT
Start: 2022-10-15 | End: 2022-10-27 | Stop reason: HOSPADM

## 2022-10-15 RX ORDER — MINERAL OIL AND PETROLATUM 150; 830 MG/G; MG/G
1 OINTMENT OPHTHALMIC
Status: DISCONTINUED | OUTPATIENT
Start: 2022-10-15 | End: 2022-10-27 | Stop reason: HOSPADM

## 2022-10-15 RX ORDER — HALOPERIDOL 5 MG/ML
5 INJECTION INTRAMUSCULAR
Status: DISCONTINUED | OUTPATIENT
Start: 2022-10-15 | End: 2022-10-27 | Stop reason: HOSPADM

## 2022-10-15 RX ORDER — PROPRANOLOL HYDROCHLORIDE 10 MG/1
10 TABLET ORAL EVERY 8 HOURS PRN
Status: DISCONTINUED | OUTPATIENT
Start: 2022-10-15 | End: 2022-10-27 | Stop reason: HOSPADM

## 2022-10-15 RX ORDER — BENZTROPINE MESYLATE 1 MG/ML
1 INJECTION INTRAMUSCULAR; INTRAVENOUS
Status: DISCONTINUED | OUTPATIENT
Start: 2022-10-15 | End: 2022-10-27 | Stop reason: HOSPADM

## 2022-10-15 RX ORDER — HYDROXYZINE 50 MG/1
100 TABLET, FILM COATED ORAL
Status: DISCONTINUED | OUTPATIENT
Start: 2022-10-15 | End: 2022-10-27 | Stop reason: HOSPADM

## 2022-10-15 RX ORDER — POLYETHYLENE GLYCOL 3350 17 G/17G
17 POWDER, FOR SOLUTION ORAL DAILY PRN
Status: DISCONTINUED | OUTPATIENT
Start: 2022-10-15 | End: 2022-10-27 | Stop reason: HOSPADM

## 2022-10-15 RX ORDER — SULFAMETHOXAZOLE AND TRIMETHOPRIM 800; 160 MG/1; MG/1
1 TABLET ORAL EVERY 12 HOURS SCHEDULED
Status: COMPLETED | OUTPATIENT
Start: 2022-10-15 | End: 2022-10-18

## 2022-10-15 RX ADMIN — MELATONIN TAB 3 MG 6 MG: 3 TAB at 23:11

## 2022-10-15 RX ADMIN — SULFAMETHOXAZOLE AND TRIMETHOPRIM 1 TABLET: 800; 160 TABLET ORAL at 23:11

## 2022-10-15 NOTE — ED PROVIDER NOTES
History  Chief Complaint   Patient presents with   • Psychiatric Evaluation     Patient arrives requesting inpatient psychiatric evaluation; sutures to b/l wrists; patient denies SI/HI/AH/VH/etoh/drug use     Patient is a 41-year-old male with a history of PTSD presents requesting [address] admission  Was recently admitted to [facility] after a suicide attempt  Cut his wrists as well as overdosing on benadryl  During transport from [place] to [facility] for [address] admission, patient escaped transport and left  Now back with mom to be admitted  Non compliant with meds  Denies any Si currently  No Hi/hallucinations  +depression  None       Past Medical History:   Diagnosis Date   • PTSD (post-traumatic stress disorder)        History reviewed  No pertinent surgical history  History reviewed  No pertinent family history  I have reviewed and agree with the history as documented  E-Cigarette/Vaping   • E-Cigarette Use Never User      E-Cigarette/Vaping Substances     Social History     Tobacco Use   • Smoking status: Never Smoker   • Smokeless tobacco: Never Used   Vaping Use   • Vaping Use: Never used   Substance Use Topics   • Drug use: Yes     Types: Marijuana       Review of Systems   Constitutional: Negative  HENT: Negative  Eyes: Negative  Respiratory: Negative  Cardiovascular: Negative  Gastrointestinal: Negative  Endocrine: Negative  Genitourinary: Negative  Musculoskeletal: Negative  Skin: Negative  Allergic/Immunologic: Negative  Neurological: Negative  Hematological: Negative  Psychiatric/Behavioral: Positive for dysphoric mood and suicidal ideas  All other systems reviewed and are negative  Physical Exam  Physical Exam  Vitals and nursing note reviewed  Constitutional:       Appearance: Normal appearance  He is normal weight  HENT:      Head: Normocephalic and atraumatic        Nose: Nose normal       Mouth/Throat:      Mouth: Mucous membranes are moist    Cardiovascular:      Rate and Rhythm: Normal rate and regular rhythm  Pulses: Normal pulses  Heart sounds: Normal heart sounds  Pulmonary:      Effort: Pulmonary effort is normal       Breath sounds: Normal breath sounds  Abdominal:      General: Bowel sounds are normal       Palpations: Abdomen is soft  Musculoskeletal:         General: Normal range of motion  Cervical back: Normal range of motion and neck supple  Skin:     General: Skin is warm  Capillary Refill: Capillary refill takes less than 2 seconds  Comments: Well healing lacerations to b/l wrists  Sutures inplace  Neurological:      General: No focal deficit present  Mental Status: He is alert and oriented to person, place, and time  Psychiatric:         Attention and Perception: Attention normal          Mood and Affect: Affect is flat  Speech: Speech is delayed  Behavior: Behavior is cooperative  Cognition and Memory: Cognition normal          Judgment: Judgment is impulsive           Vital Signs  ED Triage Vitals [10/15/22 1844]   Temperature Pulse Respirations Blood Pressure SpO2   99 3 °F (37 4 °C) 96 20 149/90 100 %      Temp Source Heart Rate Source Patient Position - Orthostatic VS BP Location FiO2 (%)   Tympanic Monitor Sitting Left arm --      Pain Score       --           Vitals:    10/15/22 1844   BP: 149/90   Pulse: 96   Patient Position - Orthostatic VS: Sitting         Visual Acuity      ED Medications  Medications - No data to display    Diagnostic Studies  Results Reviewed     Procedure Component Value Units Date/Time    FLU/RSV/COVID - if FLU/RSV clinically relevant [[id number]]  (Abnormal) Collected: 10/15/22 1906    Lab Status: Final result Specimen: Nares from Nose Updated: 10/15/22 1951     SARS-CoV-2 Positive     INFLUENZA A PCR Negative     INFLUENZA B PCR Negative     RSV PCR Negative    Narrative:      FOR PEDIATRIC PATIENTS - copy/paste COVID Guidelines URL to browser: [email or web address]    SARS-CoV-2 assay is a Nucleic Acid Amplification assay intended for the  qualitative detection of nucleic acid from SARS-CoV-2 in nasopharyngeal  swabs  Results are for the presumptive identification of SARS-CoV-2 RNA  Positive results are indicative of infection with SARS-CoV-2, the virus  causing COVID-19, but do not rule out bacterial infection or co-infection  with other viruses  Laboratories within the [place] and its  territories are required to report all positive results to the appropriate  public health authorities  Negative results do not preclude SARS-CoV-2  infection and should not be used as the sole basis for treatment or other  patient management decisions  Negative results must be combined with  clinical observations, patient history, and epidemiological information  This test has not been FDA cleared or approved  This test has been authorized by FDA under an Emergency Use Authorization  (EUA)  This test is only authorized for the duration of time the  declaration that circumstances exist justifying the authorization of the  emergency use of an in vitro diagnostic tests for detection of SARS-CoV-2  virus and/or diagnosis of COVID-19 infection under section 564(b)(1) of  the Act, 21 U  S C  717XVI-7(S)(1), unless the authorization is terminated  or revoked sooner  The test has been validated but independent review by FDA  and CLIA is pending  Test performed using [employer] GeneXpert: This RT-PCR assay targets N2,  a region unique to SARS-CoV-2  A conserved region in the E-gene was chosen  for pan-Sarbecovirus detection which includes SARS-CoV-2  According to CMS-2020-01-R, this platform meets the definition of high-throughput technology      Rapid drug screen, urine [[id number]]  (Abnormal) Collected: 10/15/22 1859    Lab Status: Final result Specimen: Urine, Other Updated: 10/15/22 1944 Amph/Meth UR Negative     Barbiturate Ur Negative     Benzodiazepine Urine Negative     Cocaine Urine Negative     Methadone Urine Negative     Opiate Urine Negative     PCP Ur Negative     THC Urine Positive     Oxycodone Urine Negative    Narrative:      Presumptive report  If requested, specimen will be sent to reference lab for confirmation  FOR MEDICAL PURPOSES ONLY  IF CONFIRMATION NEEDED PLEASE CONTACT THE LAB WITHIN 5 DAYS  Drug Screen Cutoff Levels:  AMPHETAMINE/METHAMPHETAMINES  1000 ng/mL  BARBITURATES     200 ng/mL  BENZODIAZEPINES     200 ng/mL  COCAINE      300 ng/mL  METHADONE      300 ng/mL  OPIATES      300 ng/mL  PHENCYCLIDINE     25 ng/mL  THC       50 ng/mL  OXYCODONE      100 ng/mL    POCT alcohol breath test [[id number]]  (Normal) Resulted: 10/15/22 1859    Lab Status: Final result Updated: 10/15/22 1859     EXTBreath Alcohol 0 00                 No orders to display              Procedures  Procedures         ED Course                                             MDM  Number of Diagnoses or Management Options     Amount and/or Complexity of Data Reviewed  Clinical lab tests: ordered and reviewed  Tests in the medicine section of CPT®: reviewed and ordered  Obtain history from someone other than the patient: yes  Review and summarize past medical records: yes  Independent visualization of images, tracings, or specimens: yes        Disposition  Final diagnoses:   Depression   COVID-19     Time reflects when diagnosis was documented in both MDM as applicable and the Disposition within this note     Time User Action Codes Description Comment    10/15/2022  7:41 PM Ronni Primrose Add Belus [place]  A] Depression     10/15/2022  8:53 PM Ronni Primrose Add [U07 1] COVID-19       ED Disposition     ED Disposition   Transfer to Behavioral Health    [facility]   --    Date/Time   Sat Oct 15, 2022  7:40 PM    Comment   Ciera Pale should be transferred out to TBD and has been medically cleared  Follow-up Information    None         Patient's Medications    No medications on file       No discharge procedures on file      PDMP Review       Value Time User    PDMP Reviewed  Yes 10/12/2022  1:49 PM Cindy Gomez MD          ED Provider  Electronically Signed by           Latonia Francois MD  10/15/22 4437

## 2022-10-16 PROBLEM — U07.1 COVID: Status: ACTIVE | Noted: 2022-10-16

## 2022-10-16 PROBLEM — D72.829 LEUKOCYTOSIS: Status: RESOLVED | Noted: 2022-10-12 | Resolved: 2022-10-16

## 2022-10-16 PROBLEM — F33.2 SEVERE EPISODE OF RECURRENT MAJOR DEPRESSIVE DISORDER, WITHOUT PSYCHOTIC FEATURES (HCC): Status: ACTIVE | Noted: 2022-10-16

## 2022-10-16 PROBLEM — Z00.8 MEDICAL CLEARANCE FOR PSYCHIATRIC ADMISSION: Status: ACTIVE | Noted: 2022-10-16

## 2022-10-16 PROBLEM — T50.901A OVERDOSE: Status: RESOLVED | Noted: 2022-10-12 | Resolved: 2022-10-16

## 2022-10-16 LAB
25(OH)D3 SERPL-MCNC: 26.7 NG/ML (ref 30–100)
ALBUMIN SERPL BCP-MCNC: 4.4 G/DL (ref 3.5–5)
ALP SERPL-CCNC: 45 U/L (ref 43–122)
ALT SERPL W P-5'-P-CCNC: 43 U/L
ANION GAP SERPL CALCULATED.3IONS-SCNC: 9 MMOL/L (ref 5–14)
AST SERPL W P-5'-P-CCNC: 92 U/L (ref 17–59)
ATRIAL RATE: 0 BPM
ATRIAL RATE: 71 BPM
BASOPHILS # BLD AUTO: 0.04 THOUSANDS/ÂΜL (ref 0–0.1)
BASOPHILS NFR BLD AUTO: 0 % (ref 0–1)
BILIRUB SERPL-MCNC: 1.6 MG/DL (ref 0.2–1)
BUN SERPL-MCNC: 14 MG/DL (ref 5–25)
CALCIUM SERPL-MCNC: 9.2 MG/DL (ref 8.4–10.2)
CHLORIDE SERPL-SCNC: 101 MMOL/L (ref 96–108)
CHOLEST SERPL-MCNC: 161 MG/DL
CO2 SERPL-SCNC: 27 MMOL/L (ref 21–32)
CREAT SERPL-MCNC: 0.77 MG/DL (ref 0.7–1.5)
EOSINOPHIL # BLD AUTO: 0.24 THOUSAND/ÂΜL (ref 0–0.61)
EOSINOPHIL NFR BLD AUTO: 2 % (ref 0–6)
ERYTHROCYTE [DISTWIDTH] IN BLOOD BY AUTOMATED COUNT: 12.4 % (ref 11.6–15.1)
FOLATE SERPL-MCNC: 7.7 NG/ML (ref 3.1–17.5)
GFR SERPL CREATININE-BSD FRML MDRD: 120 ML/MIN/1.73SQ M
GLUCOSE SERPL-MCNC: 109 MG/DL (ref 70–99)
HCT VFR BLD AUTO: 42.1 % (ref 36.5–49.3)
HDLC SERPL-MCNC: 34 MG/DL
HGB BLD-MCNC: 13.7 G/DL (ref 12–17)
IMM GRANULOCYTES # BLD AUTO: 0.03 THOUSAND/UL (ref 0–0.2)
IMM GRANULOCYTES NFR BLD AUTO: 0 % (ref 0–2)
LDLC SERPL CALC-MCNC: 106 MG/DL
LYMPHOCYTES # BLD AUTO: 2.46 THOUSANDS/ÂΜL (ref 0.6–4.47)
LYMPHOCYTES NFR BLD AUTO: 20 % (ref 14–44)
MCH RBC QN AUTO: 31 PG (ref 26.8–34.3)
MCHC RBC AUTO-ENTMCNC: 32.5 G/DL (ref 31.4–37.4)
MCV RBC AUTO: 95 FL (ref 82–98)
MONOCYTES # BLD AUTO: 1.32 THOUSAND/ÂΜL (ref 0.17–1.22)
MONOCYTES NFR BLD AUTO: 11 % (ref 4–12)
NEUTROPHILS # BLD AUTO: 8.05 THOUSANDS/ÂΜL (ref 1.85–7.62)
NEUTS SEG NFR BLD AUTO: 67 % (ref 43–75)
NONHDLC SERPL-MCNC: 127 MG/DL
NRBC BLD AUTO-RTO: 0 /100 WBCS
P AXIS: 32 DEGREES
PLATELET # BLD AUTO: 286 THOUSANDS/UL (ref 149–390)
PMV BLD AUTO: 11.5 FL (ref 8.9–12.7)
POTASSIUM SERPL-SCNC: 3.4 MMOL/L (ref 3.5–5.3)
PR INTERVAL: 138 MS
PROT SERPL-MCNC: 7.4 G/DL (ref 6.4–8.4)
QRS AXIS: 0 DEGREES
QRS AXIS: 70 DEGREES
QRSD INTERVAL: 0 MS
QRSD INTERVAL: 82 MS
QT INTERVAL: 0 MS
QT INTERVAL: 400 MS
QTC INTERVAL: 0 MS
QTC INTERVAL: 434 MS
RBC # BLD AUTO: 4.42 MILLION/UL (ref 3.88–5.62)
SODIUM SERPL-SCNC: 137 MMOL/L (ref 135–147)
T WAVE AXIS: 0 DEGREES
T WAVE AXIS: 35 DEGREES
TRIGL SERPL-MCNC: 104 MG/DL
TSH SERPL DL<=0.05 MIU/L-ACNC: 3.29 UIU/ML (ref 0.45–4.5)
VENTRICULAR RATE: 0 BPM
VENTRICULAR RATE: 71 BPM
VIT B12 SERPL-MCNC: 292 PG/ML (ref 100–900)
WBC # BLD AUTO: 12.14 THOUSAND/UL (ref 4.31–10.16)

## 2022-10-16 PROCEDURE — 93010 ELECTROCARDIOGRAM REPORT: CPT | Performed by: INTERNAL MEDICINE

## 2022-10-16 PROCEDURE — 99221 1ST HOSP IP/OBS SF/LOW 40: CPT | Performed by: STUDENT IN AN ORGANIZED HEALTH CARE EDUCATION/TRAINING PROGRAM

## 2022-10-16 PROCEDURE — 99252 IP/OBS CONSLTJ NEW/EST SF 35: CPT | Performed by: INTERNAL MEDICINE

## 2022-10-16 PROCEDURE — 93005 ELECTROCARDIOGRAM TRACING: CPT

## 2022-10-16 RX ORDER — SERTRALINE HYDROCHLORIDE 25 MG/1
25 TABLET, FILM COATED ORAL DAILY
Status: DISCONTINUED | OUTPATIENT
Start: 2022-10-16 | End: 2022-10-18

## 2022-10-16 RX ORDER — PRAZOSIN HYDROCHLORIDE 1 MG/1
1 CAPSULE ORAL
Status: DISCONTINUED | OUTPATIENT
Start: 2022-10-16 | End: 2022-10-21

## 2022-10-16 RX ADMIN — SULFAMETHOXAZOLE AND TRIMETHOPRIM 1 TABLET: 800; 160 TABLET ORAL at 08:51

## 2022-10-16 RX ADMIN — PRAZOSIN HYDROCHLORIDE 1 MG: 1 CAPSULE ORAL at 21:46

## 2022-10-16 RX ADMIN — PSYLLIUM HUSK 1 PACKET: 3.4 POWDER ORAL at 08:53

## 2022-10-16 RX ADMIN — SERTRALINE HYDROCHLORIDE 25 MG: 25 TABLET ORAL at 10:49

## 2022-10-16 RX ADMIN — NICOTINE 14 MG: 14 PATCH, EXTENDED RELEASE TRANSDERMAL at 08:52

## 2022-10-16 RX ADMIN — SULFAMETHOXAZOLE AND TRIMETHOPRIM 1 TABLET: 800; 160 TABLET ORAL at 21:46

## 2022-10-16 RX ADMIN — MELATONIN TAB 3 MG 6 MG: 3 TAB at 21:46

## 2022-10-16 NOTE — NURSING NOTE
The patient is a [id number] admission status 33years old transfer from [facility]  The patient has a Hx of PTSD and was recently admitted to [facility] after a suicidal attempt where the patient cut his wrists and overdosed on Benadryl pills  The patient eloped from the transport to the [address] [place] [address] unit on [facility]  Later appears with his mother on [facility]  The patient was calm and cooperative on approach at the moment of the admission  Denies SI, AH, and VH  The patient presented a bilateral wrist wrap-up from the past suicidal attempt  The patient denies pain or discomfort

## 2022-10-16 NOTE — ASSESSMENT & PLAN NOTE
· By history, unclear if mood disorder vs psychosis   · Status post suicide attempt with Benadryl overdose and bilateral wrist lacerations   · Management per psychiatry   At this time, patient appears medically stable to continue inpatient psychiatric management  Current labs, nursing notes and imaging reviewed  Pending Labs:  Folate, B12, Vit D, RPR  Recent EKG: 10/14 - NSR 95 BPM

## 2022-10-16 NOTE — PLAN OF CARE
Problem: INFECTION - ADULT  Goal: Absence or prevention of progression during hospitalization  Description: INTERVENTIONS:  - Assess and monitor for signs and symptoms of infection  - Monitor lab/diagnostic results  - Monitor all insertion sites, i e  indwelling lines, tubes, and drains  - Monitor endotracheal if appropriate and nasal secretions for changes in amount and color  - [place] appropriate cooling/warming therapies per order  - Administer medications as ordered  - Instruct and encourage patient and family to use good hand hygiene technique  - Identify and instruct in appropriate isolation precautions for identified infection/condition  10/16/2022 1002 by Fallon Rasheed RN  Outcome: Progressing  10/16/2022 1002 by Fallon Rasheed RN  Outcome: Progressing  Goal: Absence of fever/infection during neutropenic period  Description: INTERVENTIONS:  - Monitor WBC    10/16/2022 1002 by Fallon Rasheed RN  Outcome: Progressing  10/16/2022 1002 by Fallon Rasheed RN  Outcome: Progressing     Problem: Risk for Self Injury/Neglect  Goal: Treatment Goal: Remain safe during length of stay, learn and adopt new coping skills, and be free of self-injurious ideation, impulses and acts at the time of discharge  10/16/2022 1002 by Fallon Rasheed RN  Outcome: Progressing  10/16/2022 1002 by Fallon Rasheed RN  Outcome: Progressing  Goal: Verbalize thoughts and feelings  Description: Interventions:  - Assess and re-assess patient's lethality and potential for self-injury  - Engage patient in 1:1 interactions, daily, for a minimum of 15 minutes  - Encourage patient to express feelings, fears, frustrations, hopes  - Establish rapport/trust with patient   10/16/2022 1002 by Fallon Rasheed RN  Outcome: Progressing  10/16/2022 1002 by Fallon Rasheed RN  Outcome: Progressing  Goal: Refrain from harming self  Description: Interventions:  - Monitor patient closely, per order  - Develop a trusting relationship  - Supervise medication ingestion, monitor effects and side effects   10/16/2022 1002 by Margarita Cordoba RN  Outcome: Progressing  10/16/2022 1002 by Margarita Cordoba RN  Outcome: Progressing  Goal: Attend and participate in unit activities, including therapeutic, recreational, and educational groups  Description: Interventions:  - Provide therapeutic and educational activities daily, encourage attendance and participation, and document same in the medical record  - Obtain collateral information, encourage visitation and family involvement in care   10/16/2022 1002 by Margarita Cordoba RN  Outcome: Progressing  10/16/2022 1002 by Margarita Cordoba RN  Outcome: Progressing  Goal: Recognize maladaptive responses and adopt new coping mechanisms  10/16/2022 1002 by Margarita Cordoba RN  Outcome: Progressing  10/16/2022 1002 by Margarita Cordoba RN  Outcome: Progressing  Goal: Complete daily ADLs, including personal hygiene independently, as able  Description: Interventions:  - Observe, teach, and assist patient with ADLS  - Monitor and promote a balance of rest/activity, with adequate nutrition and elimination  10/16/2022 1002 by Margarita Cordoba RN  Outcome: Progressing  10/16/2022 1002 by Margarita Cordoba RN  Outcome: Progressing

## 2022-10-16 NOTE — NURSING NOTE
Pt pleasant and cooperative, visible on unit  Pt appears calm, makes needs known appropriately  Pt denies depression, anxiety, SI currently  Pt is reading book, exercising in room, watching tv with peers  Good intake for meals  Medication compliant  Education sheet on zoloft provided per pt request  Bilateral wrist dressings clean and intact currently, will monitor
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
## 2022-10-16 NOTE — ASSESSMENT & PLAN NOTE
· On 10/12  Status post laceration revision and hematoma drainage by ortho on 10/12  · Continue Bactrim DS for 3 days which was supposed to start at discharge however patient eloped  · Continue wound care   · Patient did not recall the date for suture  According to RN, wounds are pretty deep  Assessment for sutures removal on 10/19/2022    · Noted that wound care has been ordered

## 2022-10-16 NOTE — ASSESSMENT & PLAN NOTE
· Incidentally positive on 10/15  · No treatment required  · Isolation precautions per CDC/Network guidelines

## 2022-10-16 NOTE — ASSESSMENT & PLAN NOTE
· [place]ed at [address], could be stress response/due to COVID  · Monitor CBC  · Continue Bactrim as above   · No need for further antibiotics at this time

## 2022-10-16 NOTE — CONSULTS
Trevin Reid 1990, 33 y o  male MRN: [id number]  Unit/Bed#: [facility] 333-90 Encounter: [id number]  Primary Care Provider: No primary care provider on file  Date and time admitted to hospital: 10/15/2022  6:46 PM    Inpatient consult for Medical Clearance for [address] patient  Consult performed by: Gaby Del Angel MD  Consult ordered by: Siri Ashley MD          Medical clearance for psychiatric admission  Assessment & Plan  · Patient is medically cleared for admission to the [facility] for treatment of the underlying psychiatric illness    COVID  Assessment & Plan  · Incidentally positive on 10/15  · No treatment required  · Isolation precautions per CDC/Network guidelines     PTSD (post-traumatic stress disorder)  Assessment & Plan  · By history, unclear if mood disorder vs psychosis   · Status post suicide attempt with Benadryl overdose and bilateral wrist lacerations   · Management per psychiatry   At this time, patient appears medically stable to continue inpatient psychiatric management  Current labs, nursing notes and imaging reviewed  Pending Labs: Folate, B12, Vit D, RPR  Recent EKG: 10/14 - NSR 95 BPM      Wrist laceration  Assessment & Plan  · On 10/12  Status post laceration revision and hematoma drainage by ortho on 10/12  · Continue Bactrim DS for 3 days which was supposed to start at discharge however patient eloped  · Continue wound care   · Patient did not recall the date for suture  According to RN, wounds are pretty deep  Assessment for sutures removal on 10/19/2022    · Noted that wound care has been ordered    * Severe episode of recurrent major depressive disorder, without psychotic features [facility])  Assessment & Plan  · Management per primary service    Leukocytosis-resolved as of 10/16/2022  Assessment & Plan  · [place]ed [place] [address], could be stress response/due to COVID  · Monitor CBC  · Continue Bactrim as above   · No need for further antibiotics at this time     Overdose with Benadryl; with anticholinergic toxidrome-resolved as of 10/16/2022  Assessment & Plan  · On 10/12, status post Toxicology evaluation     Total Time for Visit, including Counseling / Coordination of Care: 30 minutes  Greater than 50% of this total time spent on direct patient counseling and coordination of care  Chief Complaint:   Medical clearance    History of Present Illness:    Micaela Knowles is a 33 y o  male who was admitted to [facility] for PTSD  We were consulted for medical management  During my encounter, patient stated he is completely asymptomatic from [place] standpoint  He had bilateral wrist wounds and got suture in ED however he could not recall exactly  Patient stated it was on Monday  According to RN, the wounds are pretty deep  Currently the wound is covered with dressing  Will put wound care consult  Review of Systems:    Review of Systems Patient was seen and examined at bedside  The patient denies any pain, headache, blurry vision, chest pain, palpitation, shortness of breath, N/V, abd pain  Past Medical and Surgical History:     Past Medical History:   Diagnosis Date   • PTSD (post-traumatic stress disorder)        History reviewed  No pertinent surgical history  Meds/Allergies:    Prior to Admission medications    Not on File     I have reviewed home medications with patient personally  Allergies: No Known Allergies    Social History:     Marital Status: Single   Substance Use History:   Social History     Substance and Sexual Activity   Alcohol Use None     Social History     Tobacco Use   Smoking Status Never Smoker   Smokeless Tobacco Never Used     Social History     Substance and Sexual Activity   Drug Use Yes   • Types: Marijuana       Family History:    History reviewed  No pertinent family history      Physical Exam:     Vitals:   Blood Pressure: 117/60 (10/16/22 0819)  Pulse: 73 (10/16/22 0819)  Temperature: 97 9 °F (36 6 °C) (10/16/22 3880)  Temp Source: Temporal (10/16/22 0819)  Respirations: 18 (10/16/22 0819)  Height: 6' 3" (190 5 cm) (10/15/22 2233)  Weight - Scale: 88 1 kg (194 lb 3 2 oz) (10/15/22 2233)  SpO2: 98 % (10/16/22 0819)    Physical Exam    General: breathing well on room air, no acute distress  HEENT: NC/AT, PERRL, EOM - normal  Neck: Supple  Pulm/Chest: Normal chest wall expansion, clear breath sounds on both side, no wheezing/rhonchi or crackles appreciated  CVS: RRR, normal S1&S2, no murmur appreciated, capillary refill <2s  Abd: soft, non tender, non distended, bowel sounds +  MSK: move all 4 extremities spontaneously, bilateral wrist wounds covered in dressing  Skin: warm  CNS: no acute focal neuro deficit      Additional Data:     Lab Results: I have personally reviewed pertinent reports  Results from last 7 days   Lab Units 10/15/22  2337   WBC Thousand/uL 12 14*   HEMOGLOBIN g/dL 13 7   HEMATOCRIT % 42 1   PLATELETS Thousands/uL 286   NEUTROS PCT % 67   LYMPHS PCT % 20   MONOS PCT % 11   EOS PCT % 2     Results from last 7 days   Lab Units 10/15/22  2337   SODIUM mmol/L 137   POTASSIUM mmol/L 3 4*   CHLORIDE mmol/L 101   CO2 mmol/L 27   BUN mg/dL 14   CREATININE mg/dL 0 77   ANION GAP mmol/L 9   CALCIUM mg/dL 9 2   ALBUMIN g/dL 4 4   TOTAL BILIRUBIN mg/dL 1 60*   ALK PHOS U/L 45   ALT U/L 43   AST U/L 92*   GLUCOSE RANDOM mg/dL 109*                       Imaging: I have personally reviewed pertinent reports  No orders to display       EKG, Pathology, and Other Studies Reviewed on Admission:   · EKG:  EKG show NSR,     Allscripts / Epic Records Reviewed: Yes     ** Please Note: This note has been constructed using a voice recognition system   **

## 2022-10-16 NOTE — ASSESSMENT & PLAN NOTE
· Patient is medically cleared for admission to the [facility] for treatment of the underlying psychiatric illness

## 2022-10-16 NOTE — PLAN OF CARE
Problem: Risk for Self Injury/Neglect  Goal: Treatment Goal: Remain safe during length of stay, learn and adopt new coping skills, and be free of self-injurious ideation, impulses and acts at the time of discharge  10/16/2022 1148 by Chela Elliott RN  Outcome: Progressing  10/16/2022 1002 by Chela Elliott RN  Outcome: Progressing  10/16/2022 1002 by Chela Elliott RN  Outcome: Progressing  Goal: Verbalize thoughts and feelings  Description: Interventions:  - Assess and re-assess patient's lethality and potential for self-injury  - Engage patient in 1:1 interactions, daily, for a minimum of 15 minutes  - Encourage patient to express feelings, fears, frustrations, hopes  - Establish rapport/trust with patient   10/16/2022 1148 by Chela Elliott RN  Outcome: Progressing  10/16/2022 1002 by Chela Elliott RN  Outcome: Progressing  10/16/2022 1002 by Chela Elliott RN  Outcome: Progressing  Goal: Refrain from harming self  Description: Interventions:  - Monitor patient closely, per order  - Develop a trusting relationship  - Supervise medication ingestion, monitor effects and side effects   10/16/2022 1148 by Chela Elliott RN  Outcome: Progressing  10/16/2022 1002 by Chela Elliott RN  Outcome: Progressing  10/16/2022 1002 by Chela Elliott RN  Outcome: Progressing  Goal: Attend and participate in unit activities, including therapeutic, recreational, and educational groups  Description: Interventions:  - Provide therapeutic and educational activities daily, encourage attendance and participation, and document same in the medical record  - Obtain collateral information, encourage visitation and family involvement in care   10/16/2022 1148 by Chela Elliott RN  Outcome: Progressing  10/16/2022 1002 by Chela Elliott RN  Outcome: Progressing  10/16/2022 1002 by Chela Elliott RN  Outcome: Progressing  Goal: Recognize maladaptive responses and adopt new coping mechanisms  10/16/2022 1148 by Chela Elliott RN  Outcome: Progressing  10/16/2022 1002 by Jennifer Grace RN  Outcome: Progressing  10/16/2022 1002 by Jennifer Grace RN  Outcome: Progressing  Goal: Complete daily ADLs, including personal hygiene independently, as able  Description: Interventions:  - Observe, teach, and assist patient with ADLS  - Monitor and promote a balance of rest/activity, with adequate nutrition and elimination  10/16/2022 1148 by Jennifer Grace RN  Outcome: Progressing  10/16/2022 1002 by Jennifer Grace RN  Outcome: Progressing  10/16/2022 1002 by Jennifer Grace RN  Outcome: Progressing     Problem: Depression  Goal: Treatment Goal: Demonstrate behavioral control of depressive symptoms, verbalize feelings of improved mood/affect, and adopt new coping skills prior to discharge  10/16/2022 1148 by Jennifer Grace RN  Outcome: Progressing  10/16/2022 1002 by Jennifer Grace RN  Outcome: Progressing  10/16/2022 1002 by Jennifer Grace RN  Outcome: Progressing  Goal: Verbalize thoughts and feelings  Description: Interventions:  - Assess and re-assess patient's level of risk   - Engage patient in 1:1 interactions, daily, for a minimum of 15 minutes   - Encourage patient to express feelings, fears, frustrations, hopes   10/16/2022 1148 by Jennifer Grace RN  Outcome: Progressing  10/16/2022 1002 by Jennifer Grace RN  Outcome: Progressing  10/16/2022 1002 by Jennifer Grace RN  Outcome: Progressing  Goal: Refrain from harming self  Description: Interventions:  - Monitor patient closely, per order   - Supervise medication ingestion, monitor effects and side effects   10/16/2022 1148 by Jennifer Grace RN  Outcome: Progressing  10/16/2022 1002 by Jennifer Grace RN  Outcome: Progressing  10/16/2022 1002 by Jennifer Grace RN  Outcome: Progressing  Goal: Refrain from isolation  Description: Interventions:  - Develop a trusting relationship   - Encourage socialization   10/16/2022 1148 by Jennifer Grace RN  Outcome: Progressing  10/16/2022 1002 by Juan Watters RN  Outcome: Progressing  10/16/2022 1002 by Juan Watters RN  Outcome: Progressing  Goal: Refrain from self-neglect  10/16/2022 1148 by Juan Watters RN  Outcome: Progressing  10/16/2022 1002 by Juan Watters RN  Outcome: Progressing  10/16/2022 1002 by Juan Watters RN  Outcome: Progressing  Goal: Attend and participate in unit activities, including therapeutic, recreational, and educational groups  Description: Interventions:  - Provide therapeutic and educational activities daily, encourage attendance and participation, and document same in the medical record   10/16/2022 1148 by Juan Watters RN  Outcome: Progressing  10/16/2022 1002 by Juan Watters RN  Outcome: Progressing  10/16/2022 1002 by Juan Watters RN  Outcome: Progressing  Goal: Complete daily ADLs, including personal hygiene independently, as able  Description: Interventions:  - Observe, teach, and assist patient with ADLS  -  Monitor and promote a balance of rest/activity, with adequate nutrition and elimination   10/16/2022 1148 by Juan Watters RN  Outcome: Progressing  10/16/2022 1002 by Juan Watters RN  Outcome: Progressing  10/16/2022 1002 by Juan Watters RN  Outcome: Progressing     Problem: Anxiety  Goal: Anxiety is at manageable level  Description: Interventions:  - Assess and monitor patient's anxiety level  - Monitor for signs and symptoms (heart palpitations, chest pain, shortness of breath, headaches, nausea, feeling jumpy, restlessness, irritable, apprehensive)  - Collaborate with interdisciplinary team and initiate plan and interventions as ordered    - [place] patient to unit/surroundings  - Explain treatment plan  - Encourage participation in care  - Encourage verbalization of concerns/fears  - Identify coping mechanisms  - Assist in developing anxiety-reducing skills  - Administer/offer alternative therapies  - Limit or eliminate stimulants  10/16/2022 1148 by Juan Watters RN  Outcome: Progressing  10/16/2022 1002 by Gloria Prieto RN  Outcome: Progressing  10/16/2022 1002 by Gloria Prieto RN  Outcome: Progressing

## 2022-10-17 LAB
BASOPHILS # BLD AUTO: 0.03 THOUSANDS/ÂΜL (ref 0–0.1)
BASOPHILS NFR BLD AUTO: 1 % (ref 0–1)
EOSINOPHIL # BLD AUTO: 0.22 THOUSAND/ÂΜL (ref 0–0.61)
EOSINOPHIL NFR BLD AUTO: 4 % (ref 0–6)
ERYTHROCYTE [DISTWIDTH] IN BLOOD BY AUTOMATED COUNT: 12.2 % (ref 11.6–15.1)
HCT VFR BLD AUTO: 38.4 % (ref 36.5–49.3)
HGB BLD-MCNC: 12.6 G/DL (ref 12–17)
IMM GRANULOCYTES # BLD AUTO: 0.02 THOUSAND/UL (ref 0–0.2)
IMM GRANULOCYTES NFR BLD AUTO: 0 % (ref 0–2)
LYMPHOCYTES # BLD AUTO: 1.85 THOUSANDS/ÂΜL (ref 0.6–4.47)
LYMPHOCYTES NFR BLD AUTO: 32 % (ref 14–44)
MCH RBC QN AUTO: 31.2 PG (ref 26.8–34.3)
MCHC RBC AUTO-ENTMCNC: 32.8 G/DL (ref 31.4–37.4)
MCV RBC AUTO: 95 FL (ref 82–98)
MONOCYTES # BLD AUTO: 0.72 THOUSAND/ÂΜL (ref 0.17–1.22)
MONOCYTES NFR BLD AUTO: 13 % (ref 4–12)
NEUTROPHILS # BLD AUTO: 2.89 THOUSANDS/ÂΜL (ref 1.85–7.62)
NEUTS SEG NFR BLD AUTO: 50 % (ref 43–75)
NRBC BLD AUTO-RTO: 0 /100 WBCS
PLATELET # BLD AUTO: 203 THOUSANDS/UL (ref 149–390)
PMV BLD AUTO: 11 FL (ref 8.9–12.7)
RBC # BLD AUTO: 4.04 MILLION/UL (ref 3.88–5.62)
RPR SER QL: NORMAL
WBC # BLD AUTO: 5.73 THOUSAND/UL (ref 4.31–10.16)

## 2022-10-17 PROCEDURE — 85025 COMPLETE CBC W/AUTO DIFF WBC: CPT | Performed by: PHYSICIAN ASSISTANT

## 2022-10-17 PROCEDURE — 99232 SBSQ HOSP IP/OBS MODERATE 35: CPT | Performed by: STUDENT IN AN ORGANIZED HEALTH CARE EDUCATION/TRAINING PROGRAM

## 2022-10-17 RX ORDER — SERTRALINE HYDROCHLORIDE 25 MG/1
25 TABLET, FILM COATED ORAL ONCE
Status: COMPLETED | OUTPATIENT
Start: 2022-10-17 | End: 2022-10-17

## 2022-10-17 RX ADMIN — SULFAMETHOXAZOLE AND TRIMETHOPRIM 1 TABLET: 800; 160 TABLET ORAL at 21:16

## 2022-10-17 RX ADMIN — MELATONIN TAB 3 MG 6 MG: 3 TAB at 21:16

## 2022-10-17 RX ADMIN — PRAZOSIN HYDROCHLORIDE 1 MG: 1 CAPSULE ORAL at 21:16

## 2022-10-17 RX ADMIN — NICOTINE 14 MG: 14 PATCH, EXTENDED RELEASE TRANSDERMAL at 08:33

## 2022-10-17 RX ADMIN — PSYLLIUM HUSK 1 PACKET: 3.4 POWDER ORAL at 08:33

## 2022-10-17 RX ADMIN — SULFAMETHOXAZOLE AND TRIMETHOPRIM 1 TABLET: 800; 160 TABLET ORAL at 08:33

## 2022-10-17 RX ADMIN — SERTRALINE HYDROCHLORIDE 25 MG: 25 TABLET ORAL at 14:27

## 2022-10-17 NOTE — NURSING NOTE
Patient alert and cooperative, pleasant on approach  Visible in milieu interacting with peers  Denies pain, denies SI/HI/AVH  Refused AM Zoloft  Dressing to b/l wrist intact  No other issues observed this shift   Q 7 min safety checks ongoing

## 2022-10-17 NOTE — NURSING NOTE
Pt is calm and pleasant brightens on approach  Pt showered this evening with both wrists covered  Bandages were inspected following shower and were found to be dry and intact  Pt currently reading in room  Will continue to monitor

## 2022-10-17 NOTE — PROGRESS NOTES
Progress Note - Kaykay Norman Reid 33 y o  male MRN: [id number]  Unit/Bed#: [facility] 845-49 Encounter: [id number]    The patient was seen for continuing care and reviewed with treatment team  Patient is alert, and awake  Seen  doing some exercises in his room  Patient able to describe events leading to admission but minimized certain portions of what happened, stating he did not feel comfortable sharing the details  Did not want to talk about where exactly at home he cut himself and did not what to discuss how he was found  He denies active suicidal or homicidal ideation but frequently remained guarded, resistant to medication - did not take zoloft this morning  citing worsening of his tinnitus after first dose  With psychoeducation he stated he would try another dose  Patient  remains resistant to  medication but seemed interested in other non- medication options, asking to see nutritionist to discuss how to get rid of body toxins, asked about doing yoga on the unit, and videos for hypnosis  Wants a non- benzo for PRN anxiety( atarax)  He denies any prior trials of medications states zyprexa was previously used off label for his PTSD       /56 (BP Location: Right arm)   Pulse 68   Temp 97 7 °F (36 5 °C) (Temporal)   Resp 16   Ht 6' 3" (1 905 m)   Wt 88 1 kg (194 lb 3 2 oz)   SpO2 99%   BMI 24 27 kg/m²     Current Mental Status Evaluation:  Appearance:  Marginal/poor hygiene, bandages, Bilateral wrists   Behavior:  calm, cooperative, evasive, guarded and Superficial   Mood:  Depressed   Affect: Reactive    Speech: Normal volume and Normal rate   Thought Process:  Goal directed and coherent   Thought Content:  Does not verbalize delusional material   Perceptual Disturbances: Denies hallucinations and does not appear to be responding to internal stimuli   Risk Potential: No suicidal or homicidal ideation   Orientation:   Patient is alert and awake , Oriented x3   Patient has limited insight, judgment and Impulse control  Progress Toward Goals: No significant events in the past 24 hours  Patient  Needs continued inpatient treatment  Pt is a 201  Principal Problem:    Severe episode of recurrent major depressive disorder, without psychotic features ([address] )  Active Problems:    Wrist laceration    PTSD (post-traumatic stress disorder)    COVID    Medical clearance for psychiatric admission    Discharge planning update: The patient will return to previous living arrangement- Like plan will be  to transfer to [address] [place] [address] after COVID quarantine    Recommended Treatment: Continue with pharmacotherapy, group therapy, milieu therapy and occupational therapy    The patient will be maintained on the following medications:  Current Facility-Administered Medications   Medication Dose Route Frequency Provider Last Rate   • acetaminophen  650 mg Oral Q6H PRN Forestine Ends, MD     • acetaminophen  650 mg Oral Q4H PRN Forestine Ends, MD     • acetaminophen  975 mg Oral Q6H PRN Forestine Ends, MD     • aluminum-magnesium hydroxide-simethicone  30 mL Oral Q4H PRN Forestine Ends, MD     • artificial tear  1 application Both Eyes E7I PRN Forestine Ends, MD     • haloperidol lactate  2 5 mg Intramuscular Q6H PRN Max 4/day Forestine MD Claudy      And   • LORazepam  1 mg Intramuscular Q6H PRN Max 4/day Forestine Ends, MD      And   • benztropine  0 5 mg Intramuscular Q6H PRN Max 4/day Forestine Ends, MD     • haloperidol lactate  5 mg Intramuscular Q4H PRN Max 4/day Forestine MD Claudy      And   • LORazepam  2 mg Intramuscular Q4H PRN Max 4/day Forestine Ends, MD      And   • benztropine  1 mg Intramuscular Q4H PRN Max 4/day Forestine MD Claudy     • benztropine  1 mg Intramuscular Q4H PRN Max 6/day Forestine MD Claudy     • benztropine  1 mg Oral Q4H PRN Max 6/day Forestine Ends, MD     • hydrOXYzine HCL  50 mg Oral Q6H PRN Max 4/day Louise Jiang MD      Or   • diphenhydrAMINE  50 mg Intramuscular Q6H PRN Louise Jiang MD     • haloperidol  2 mg Oral Q4H PRN Max 6/day Louise Jiang MD     • haloperidol  5 mg Oral Q6H PRN Max 4/day Louise Jiang MD     • haloperidol  5 mg Oral Q4H PRN Max 4/day Louise Jiang MD     • hydrOXYzine HCL  100 mg Oral Q6H PRN Max 4/day Louise Jiang MD      Or   • LORazepam  2 mg Intramuscular Q6H PRN Louise Jiang MD     • hydrOXYzine HCL  25 mg Oral Q6H PRN Max 4/day Louise Jiang MD     • melatonin  6 mg Oral HS Misha Aceves DO     • nicotine  14 mg Transdermal Daily Misha Aceves DO     • polyethylene glycol  17 g Oral Daily PRN Louise Jiang MD     • prazosin  1 mg Oral HS Leticia Brady DO     • propranolol  10 mg Oral Q8H PRN Louise Jiang MD     • psyllium  1 packet Oral Daily Misha Aceves DO     • senna-docusate sodium  1 tablet Oral Daily PRN Louise Jiang MD     • sertraline  25 mg Oral Daily Leticia Brady DO     • sulfamethoxazole-trimethoprim  1 tablet Oral Q12H [facility] Misha Aceves DO     • traZODone  50 mg Oral HS PRN Louise Jiang MD

## 2022-10-17 NOTE — CASE MANAGEMENT
[facility] and spoke to Morgan melton who states a 36 was initiated at [facility] by two docs on the med floor on 10/13 @ 11:30 am  The patient signed a 201 on 10/15  Nadja sent me a copy of that 302 in case we need to initiate a 302 here for this stay

## 2022-10-17 NOTE — DISCHARGE INSTR - OTHER ORDERS
Skin care plans:   1-Cleanse b/l arm wounds with normal saline, apply xeroform gauze over suture lines, cover with 1/2 ABD, secure with alejandro and tape  ACE wrap to secure and lightly wrapped for compression  Change every other day and PRN   2-Elevate heels to offload pressure  3-Ehob cushion in chair when out of bed  4-Moisturize skin daily with skin nourishing cream    5-Turn/reposition q2h or when medically stable for pressure re-distribution on skin  Upon disharge you will go to [facility] [address]  After discharge, if you find your coping skills are not as effective and you continue to feel distressed please call [facility] services are available 24 hours a day by calling [facility] [phone]  National Suicide Hotline : 1 374.616.1690    Crisis Text Line : 588877     If you feel you are a danger to yourself or others please contact 911 or go to nearest Emergency room to seek immediate help  Velia Elliott or Caren, donta Topete and Rogerio, will be calling you after your discharge, on the phone number that you provided  They will be available as an additional support, if needed  If you wish to speak with one of them, you may contact Susy Ness at [phone] or Subha Almeida at [phone]

## 2022-10-17 NOTE — NURSING NOTE
Patient out for meals and to make needs known, otherwise remained in room for majority of shift  Compliant with scheduled medications  Denies anxiety, depression, SI/HI/AH/VH  B/L wrist dressings clean, dry, intact  Will continue to monitor frequently  Patient reported LBM x3 days ago  Refused PRN medication when offered

## 2022-10-17 NOTE — UTILIZATION REVIEW
NOTIFICATION OF ADMISSION DISCHARGE   This is a Notification of Discharge from [address]  Please be advised that this patient has been discharge from our facility  Below you will find the admission and discharge date and time including the patient’s disposition  UTILIZATION REVIEW CONTACT:  Bruno Ruff MA  Utilization   Network Utilization Review Department  Phone: [phone] x carefully listen to the prompts  All voicemails are confidential   Email: [email or web address]     ADMISSION INFORMATION  PRESENTATION DATE: 10/12/2022  7:28 AM  OBERVATION ADMISSION DATE:   INPATIENT ADMISSION DATE: 10/12/22 12:24 PM   DISCHARGE DATE: 10/14/2022  7:44 PM  DISPOSITION: [address]      IMPORTANT INFORMATION:  Send all requests for admission clinical reviews, approved or denied determinations and any other requests to dedicated fax number below belonging to the campus where the patient is receiving treatment   List of dedicated fax numbers:  [address] DENIALS (Administrative/Medical Necessity) [phone]   [address] (Maternity/NICU/Pediatrics) [phone]   [facility] [phone]   [place] [address] [phone]   [address] [phone]   220 [facility] [phone]   90 [facility] [phone]   [address] [place] [address] 119 [phone]   [facility]  [phone]   [address] [facility] [phone]   412 [facility] [address] [facility] [phone]

## 2022-10-17 NOTE — PROGRESS NOTES
10/17/22 1530   Team Meeting   Meeting Type Tx Team Meeting   Initial Conference Date 10/17/22   Next Conference Date 11/16/22   Team Members Present   Team Members Present Physician;Nurse;   Patient/Family Present   Patient Present Yes   Patient's Family Present No     Met with patient to have the tx plan signed, patient refused, copy was given to him

## 2022-10-17 NOTE — CMS CERTIFICATION NOTE
Certification: Based upon physical, mental and social evaluations, I certify that inpatient psychiatric services are medically necessary for this patient for a duration of 12 midnights for the treatment of  MDD

## 2022-10-17 NOTE — PROGRESS NOTES
10/17/22 1025   Team Meeting   Meeting Type Daily Rounds   Initial Conference Date 10/17/22   Next Conference Date 10/18/22   Team Members Present   Team Members Present Physician;Nurse;;Occupational Therapist   Patient/Family Present   Patient Present No   Patient's Family Present No     Dr Marisela Bowles, Dr Mey Olivares, A  Trace, B  [facility] - pleasant, cooperative, deny s/s, social, suicide attempt, overdose, eloped transport to unit

## 2022-10-17 NOTE — PLAN OF CARE
Problem: Ineffective Coping  Goal: Demonstrates healthy coping skills  Outcome: Progressing     Problem: Ineffective Coping  Goal: Participates in unit activities  Description: Interventions:  - Provide therapeutic environment   - Provide required programming   - Redirect inappropriate behaviors   Outcome: Progressing     Problem: Ineffective Coping  Goal: Patient/Family verbalizes awareness of resources  Outcome: Progressing     Problem: Risk for Self Injury/Neglect  Goal: Treatment Goal: Remain safe during length of stay, learn and adopt new coping skills, and be free of self-injurious ideation, impulses and acts at the time of discharge  Outcome: Progressing     Problem: Risk for Self Injury/Neglect  Goal: Verbalize thoughts and feelings  Description: Interventions:  - Assess and re-assess patient's lethality and potential for self-injury  - Engage patient in 1:1 interactions, daily, for a minimum of 15 minutes  - Encourage patient to express feelings, fears, frustrations, hopes  - Establish rapport/trust with patient   Outcome: Progressing     Problem: Risk for Self Injury/Neglect  Goal: Refrain from harming self  Description: Interventions:  - Monitor patient closely, per order  - Develop a trusting relationship  - Supervise medication ingestion, monitor effects and side effects   Outcome: Progressing     Problem: Depression  Goal: Refrain from harming self  Description: Interventions:  - Monitor patient closely, per order   - Supervise medication ingestion, monitor effects and side effects   Outcome: Progressing

## 2022-10-17 NOTE — WOUND OSTOMY CARE
Patient seen at [facility] on Friday before he was transferred to [facility], orders were reconciled and placed during transfer  Wound care is following and will see patient some time this week  Skin care plans:   1-Cleanse b/l arm wounds with normal saline, apply xeroform gauze over suture lines, cover with 1/2 ABD, secure with alejandro and tape  ACE wrap to secure and lightly wrapped for compression  Change every other day and PRN   2-Elevate heels to offload pressure  3-Ehob cushion in chair when out of bed     4-Moisturize skin daily with skin nourishing cream    5-Turn/reposition q2h or when medically stable for pressure re-distribution on skin         Jolene MOHANN, RN, [employer]

## 2022-10-17 NOTE — CASE MANAGEMENT
Case Management Assessment    Patient name Doyle Valerio  Location /-02 MRN [id number]  : 1990 Date 10/17/2022       Current Admission Date: 10/15/2022  Current Admission Diagnosis:Severe episode of recurrent major depressive disorder, without psychotic features [facility])   Patient Active Problem List    Diagnosis Date Noted   • COVID 10/16/2022   • Severe episode of recurrent major depressive disorder, without psychotic features ([facility] [address] ) 10/16/2022   • Medical clearance for psychiatric admission 10/16/2022   • Wrist laceration 10/12/2022   • PTSD (post-traumatic stress disorder) 10/12/2022   • Elevated CK 10/12/2022      LOS (days): 2  Geometric Mean LOS (GMLOS) (days):   Days to GMLOS:     OBJECTIVE:  PATIENT READMITTED TO HOSPITAL  Risk of Unplanned Readmission Score: 13 02         Current admission status: Inpatient Psych  Referral Reason: Psych    Preferred Pharmacy:   PATIENT/FAMILY REPORTS NO PREFERRED PHARMACY  No address on file      Primary Care Provider: No primary care provider on file  Primary Insurance: MISC MEDICAID O  Secondary Insurance:     ASSESSMENT:  Active Health Care Proxies    There are no active Health Care Proxies on file  Readmission Root Cause  30 Day Readmission: No    Patient Information  Mental Status: Alert  Primary Caregiver: Self              Patient Information Continued  Income Source: Unemployed  Current Status[de-identified] 201         Means of Transportation  Means of Transport to [facility][de-identified] Public Transportation - Bus    CM met with patient  Pt was verbal, articulate, and made good eye contact  Patient was dressed in hospital attire  Pt was pleasant and cooperative throughout the assessment  Patient was very nonchalant when speaking about the events that led up to this inpatient visit  He voiced that this is not something he has ever done and he does not know what made him do this, this time  He attributed it to his previously diagnosed PTSD  When asked about his previous PTSD he just mentioned that it was from many various traumas  Pt lives at home with his parents in [address] [place] [address]  He did mention that he has a great support system with his family, extended family, and his friends  Pt listed many interests including, music, reading, learning, mindfulness practice, friends, tech, and his dogs  Pt has a bachelor's in international management and a minor in music  Pt mentioned that when he does leave his parents can help him with med management  He is currently unemployed and is applying for temporary disability  He mentioned that he would like to be set up with a therapist while here and when discharged as well  Pt states he is glad that he is here in the hospital because he believes that there are some environmental factors that led to this IP stay, pt did not elaborate  Pt does not have access to firearms  Pt has never been , has no children, and has not been in the [employer]  While meeting with the pt he did seem mildly paranoid and asked this writer to explain the 201/302 process to him  He also wanted to know if we have yoga classes here, tapes on self hypnosis, a nutrition consult, and printouts for exercise  He also declined his zoloft earlier and was requesting to take it, advised him to speak to his nurse  Pt states he uses [facility], but was unable to say which one  When asked the patient to provide who he would like to sign for a release of information, he stated he did not want to have anyone updated on his stay and that he will do that himself  Pt told this writer that he did not know the name of his PCP and he will let us know when he remembers

## 2022-10-17 NOTE — TREATMENT PLAN
TREATMENT PLAN REVIEW - [address] 33 y o  1990 male MRN: [id number]    [address] Room / Bed: Anaid Cabrera/[facility] [id number] Encounter: [id number]          Admit Date/Time:  10/15/2022  6:46 PM    Treatment Team: Attending Provider: Amadou Mccormick MD; Registered Nurse: Nickolas Frederick RN; Patient Care Technician: Romana Bush;  Occupational Therapy Assistant: Jennifer Fleming; Patient Care Assistant: Ramiro Mcguire    Diagnosis: Principal Problem:    Severe episode of recurrent major depressive disorder, without psychotic features ([facility] )  Active Problems:    Wrist laceration    PTSD (post-traumatic stress disorder)    COVID    Medical clearance for psychiatric admission      Patient Strengths/Assets: ability for insight, cooperative, communication skills, family ties, motivation for treatment/growth, patient is on a voluntary commitment, reasoning ability    Patient Barriers/Limitations: low self esteem, poor insight, psychosocial stressors    Short Term Goals: decrease in depressive symptoms, decrease in anxiety symptoms, decrease in suicidal thoughts, ability to stay safe on the unit, improvement in ability to express basic needs, improvement in insight, improvement in reasoning ability, improvement in self care, sleep improvement, improvement in appetite, increase in socialization with peers on the unit, acceptance of need for psychiatric treatment, acceptance of psychiatric medications    Long Term Goals: improvement in depression, improvement in anxiety, resolution of depressive symptoms, stabilization of mood, free of suicidal thoughts, improvement in reasoning ability, improved insight, able to express basic needs, acceptance of need for psychiatric medications, acceptance of need for psychiatric treatment, acceptance of need for psychiatric follow up after discharge, acceptance of psychiatric diagnosis, adequate self care, adequate sleep, adequate appetite, adequate oral intake, appropriate interaction with peers, appropriate interaction with family, stable living arrangements upon discharge, establishment of family support upon discharge    Progress Towards Goals: starting psychiatric medications as prescribed    Recommended Treatment: medication management, patient medication education, group therapy, milieu therapy, continued Behavioral Health psychiatric evaluation/assessment process  Wound care for wrist wounds  Treatment Frequency: daily medication monitoring, group and milieu therapy daily, monitoring through interdisciplinary rounds, monitoring through weekly patient care conferences    Expected Discharge Date:   To be determined, pt will likely need transfer to [facility] adult unit    Discharge Plan: referrals as indicated    Treatment Plan Created/Updated By: Lavell Shepard MD

## 2022-10-18 PROBLEM — F32.3 MAJOR DEPRESSION WITH PSYCHOTIC FEATURES (HCC): Status: ACTIVE | Noted: 2022-10-18

## 2022-10-18 PROBLEM — R39.11 URINARY HESITANCY: Status: ACTIVE | Noted: 2022-10-18

## 2022-10-18 LAB
ALBUMIN SERPL BCP-MCNC: 4.4 G/DL (ref 3.5–5)
ALP SERPL-CCNC: 44 U/L (ref 43–122)
ALT SERPL W P-5'-P-CCNC: 44 U/L
ANION GAP SERPL CALCULATED.3IONS-SCNC: 7 MMOL/L (ref 5–14)
AST SERPL W P-5'-P-CCNC: 37 U/L (ref 17–59)
ATRIAL RATE: 67 BPM
ATRIAL RATE: 74 BPM
BILIRUB SERPL-MCNC: 0.77 MG/DL (ref 0.2–1)
BILIRUB UR QL STRIP: NEGATIVE
BUN SERPL-MCNC: 7 MG/DL (ref 5–25)
CALCIUM SERPL-MCNC: 9.1 MG/DL (ref 8.4–10.2)
CHLORIDE SERPL-SCNC: 101 MMOL/L (ref 96–108)
CLARITY UR: CLEAR
CO2 SERPL-SCNC: 29 MMOL/L (ref 21–32)
COLOR UR: NORMAL
CREAT SERPL-MCNC: 0.7 MG/DL (ref 0.7–1.5)
GFR SERPL CREATININE-BSD FRML MDRD: 125 ML/MIN/1.73SQ M
GLUCOSE SERPL-MCNC: 100 MG/DL (ref 70–99)
GLUCOSE UR STRIP-MCNC: NEGATIVE MG/DL
HGB UR QL STRIP.AUTO: NEGATIVE
KETONES UR STRIP-MCNC: NEGATIVE MG/DL
LEUKOCYTE ESTERASE UR QL STRIP: NEGATIVE
NITRITE UR QL STRIP: NEGATIVE
P AXIS: 33 DEGREES
P AXIS: 38 DEGREES
PH UR STRIP.AUTO: 7 [PH]
POTASSIUM SERPL-SCNC: 4 MMOL/L (ref 3.5–5.3)
PR INTERVAL: 136 MS
PR INTERVAL: 146 MS
PROT SERPL-MCNC: 7.2 G/DL (ref 6.4–8.4)
PROT UR STRIP-MCNC: NEGATIVE MG/DL
QRS AXIS: 54 DEGREES
QRS AXIS: 67 DEGREES
QRSD INTERVAL: 76 MS
QRSD INTERVAL: 88 MS
QT INTERVAL: 358 MS
QT INTERVAL: 394 MS
QTC INTERVAL: 397 MS
QTC INTERVAL: 416 MS
SODIUM SERPL-SCNC: 137 MMOL/L (ref 135–147)
SP GR UR STRIP.AUTO: 1 (ref 1–1.04)
T WAVE AXIS: 38 DEGREES
T WAVE AXIS: 51 DEGREES
UROBILINOGEN UA: NEGATIVE MG/DL
VENTRICULAR RATE: 67 BPM
VENTRICULAR RATE: 74 BPM

## 2022-10-18 PROCEDURE — 81003 URINALYSIS AUTO W/O SCOPE: CPT | Performed by: PHYSICIAN ASSISTANT

## 2022-10-18 PROCEDURE — 80053 COMPREHEN METABOLIC PANEL: CPT | Performed by: PSYCHIATRY & NEUROLOGY

## 2022-10-18 PROCEDURE — 93005 ELECTROCARDIOGRAM TRACING: CPT

## 2022-10-18 PROCEDURE — 93010 ELECTROCARDIOGRAM REPORT: CPT | Performed by: INTERNAL MEDICINE

## 2022-10-18 PROCEDURE — 99232 SBSQ HOSP IP/OBS MODERATE 35: CPT | Performed by: PSYCHIATRY & NEUROLOGY

## 2022-10-18 PROCEDURE — 99231 SBSQ HOSP IP/OBS SF/LOW 25: CPT | Performed by: PHYSICIAN ASSISTANT

## 2022-10-18 RX ORDER — ZIPRASIDONE HYDROCHLORIDE 20 MG/1
20 CAPSULE ORAL 2 TIMES DAILY WITH MEALS
Status: DISCONTINUED | OUTPATIENT
Start: 2022-10-18 | End: 2022-10-25

## 2022-10-18 RX ADMIN — SULFAMETHOXAZOLE AND TRIMETHOPRIM 1 TABLET: 800; 160 TABLET ORAL at 08:37

## 2022-10-18 RX ADMIN — POLYETHYLENE GLYCOL 3350 17 G: 17 POWDER, FOR SOLUTION ORAL at 15:33

## 2022-10-18 RX ADMIN — MELATONIN TAB 3 MG 6 MG: 3 TAB at 21:05

## 2022-10-18 RX ADMIN — NICOTINE 14 MG: 14 PATCH, EXTENDED RELEASE TRANSDERMAL at 08:40

## 2022-10-18 RX ADMIN — PSYLLIUM HUSK 1 PACKET: 3.4 POWDER ORAL at 08:37
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
## 2022-10-18 NOTE — NURSING NOTE
Patient continues to refuse Zoloft, calm, visible, and pleasant  Patient reports anxiety 1/4, denies SI, AH, VH  Patient denies any needs at the moment  Patient in room resting comfortably

## 2022-10-18 NOTE — ASSESSMENT & PLAN NOTE
· Patient with recent benadryl overdose with urinary retention requiring winkler catheter insertion   He feels urinary hesitancy and urinary retention has not fully resolved  · Will check UA  · Check post void residual  · Continue urinary retention protocol

## 2022-10-18 NOTE — NURSING NOTE
Patient sitting in room on approach at start of shift, brightens and cooperative with assessment questions  Patient reporting 1/4 anxiety, denies depression, SI/HI/AVH  Patient social with peers throughout the shift, briefly playing the acoustic guitar for peers  Patient reporting difficulty having bowel movement, given PRN miralax at 1535  Patient requesting education packet for scheduled geodon, provided  Patient declined evening geodon dose, states "I had a problem with antipsychotics before, this isnt my problem  I need an as-needed for the anxiety spikes  Not this" Reassured  Patient compliant with HS melatonin and declines minipress  Bilateral wrist dressings changed  Patient reporting pain in L forearm, on assessment noted to have bruising on bicep and forearm  Patient suggesting this could be a stress fracture, requesting writer pass on in report  Declines PRN  Ice provided at this time  Patients mother calling and requesting update from staff, patient asked if he would like to sign an LUKAS, declines and states "I will just call her back on cordless, you can tell her that " Patients mother informed, agreeable to this  Bladder scan at 1615- 12ml and 2015- 18ml

## 2022-10-18 NOTE — PLAN OF CARE
Problem: INFECTION - ADULT  Goal: Absence or prevention of progression during hospitalization  Description: INTERVENTIONS:  - Assess and monitor for signs and symptoms of infection  - Monitor lab/diagnostic results  - Monitor all insertion sites, i e  indwelling lines, tubes, and drains  - Monitor endotracheal if appropriate and nasal secretions for changes in amount and color  - Ranger appropriate cooling/warming therapies per order  - Administer medications as ordered  - Instruct and encourage patient and family to use good hand hygiene technique  - Identify and instruct in appropriate isolation precautions for identified infection/condition  Outcome: Progressing  Goal: Absence of fever/infection during neutropenic period  Description: INTERVENTIONS:  - Monitor WBC    Outcome: Progressing     Problem: Ineffective Coping  Goal: Cooperates with admission process  Description: Interventions:   - Complete admission process  Outcome: Progressing  Goal: Identifies ineffective coping skills  Outcome: Progressing  Goal: Identifies healthy coping skills  Outcome: Progressing  Goal: Demonstrates healthy coping skills  Outcome: Progressing  Goal: Patient/Family participate in treatment and DC plans  Description: Interventions:  - Provide therapeutic environment  Outcome: Progressing  Goal: Patient/Family verbalizes awareness of resources  Outcome: Progressing  Goal: Understands least restrictive measures  Description: Interventions:  - Utilize least restrictive behavior  Outcome: Progressing  Goal: Free from restraint events  Description: - Utilize least restrictive measures   - Provide behavioral interventions   - Redirect inappropriate behaviors   Outcome: Progressing     Problem: Risk for Self Injury/Neglect  Goal: Treatment Goal: Remain safe during length of stay, learn and adopt new coping skills, and be free of self-injurious ideation, impulses and acts at the time of discharge  Outcome: Progressing  Goal: Verbalize thoughts and feelings  Description: Interventions:  - Assess and re-assess patient's lethality and potential for self-injury  - Engage patient in 1:1 interactions, daily, for a minimum of 15 minutes  - Encourage patient to express feelings, fears, frustrations, hopes  - Establish rapport/trust with patient   Outcome: Progressing  Goal: Refrain from harming self  Description: Interventions:  - Monitor patient closely, per order  - Develop a trusting relationship  - Supervise medication ingestion, monitor effects and side effects   Outcome: Progressing  Goal: Recognize maladaptive responses and adopt new coping mechanisms  Outcome: Progressing  Goal: Complete daily ADLs, including personal hygiene independently, as able  Description: Interventions:  - Observe, teach, and assist patient with ADLS  - Monitor and promote a balance of rest/activity, with adequate nutrition and elimination  Outcome: Progressing     Problem: Depression  Goal: Treatment Goal: Demonstrate behavioral control of depressive symptoms, verbalize feelings of improved mood/affect, and adopt new coping skills prior to discharge  Outcome: Progressing  Goal: Verbalize thoughts and feelings  Description: Interventions:  - Assess and re-assess patient's level of risk   - Engage patient in 1:1 interactions, daily, for a minimum of 15 minutes   - Encourage patient to express feelings, fears, frustrations, hopes   Outcome: Progressing  Goal: Refrain from harming self  Description: Interventions:  - Monitor patient closely, per order   - Supervise medication ingestion, monitor effects and side effects   Outcome: Progressing  Goal: Refrain from isolation  Description: Interventions:  - Develop a trusting relationship   - Encourage socialization   Outcome: Progressing  Goal: Refrain from self-neglect  Outcome: Progressing  Goal: Complete daily ADLs, including personal hygiene independently, as able  Description: Interventions:  - Observe, teach, and assist patient with ADLS  -  Monitor and promote a balance of rest/activity, with adequate nutrition and elimination   Outcome: Progressing     Problem: Anxiety  Goal: Anxiety is at manageable level  Description: Interventions:  - Assess and monitor patient's anxiety level  - Monitor for signs and symptoms (heart palpitations, chest pain, shortness of breath, headaches, nausea, feeling jumpy, restlessness, irritable, apprehensive)  - Collaborate with interdisciplinary team and initiate plan and interventions as ordered    - [place] patient to unit/surroundings  - Explain treatment plan  - Encourage participation in care  - Encourage verbalization of concerns/fears  - Identify coping mechanisms  - Assist in developing anxiety-reducing skills  - Administer/offer alternative therapies  - Limit or eliminate stimulants  Outcome: Progressing
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
## 2022-10-18 NOTE — ASSESSMENT & PLAN NOTE
· On 10/12   Status post laceration revision and hematoma drainage by ortho on 10/12  · Completed 3 day course of bactrim  · Continue wound care   · Orthopaedics recommends suture removal 2 weeks after initial placement - assess for removal on 10/25

## 2022-10-18 NOTE — PROGRESS NOTES
[facility]  Progress Note Ivanna Precise 1990, 33 y o  male MRN: [id number]  Unit/Bed#: Glenda Verdugo 935-40 Encounter: [id number]  Primary Care Provider: No primary care provider on file  Date and time admitted to hospital: 10/15/2022  6:46 PM    Urinary hesitancy  Assessment & Plan  · Patient with recent benadryl overdose with urinary retention requiring winkler catheter insertion  He feels urinary hesitancy and urinary retention has not fully resolved  · Will check UA  · Check post void residual  · Continue urinary retention protocol    Wrist laceration  Assessment & Plan  · On 10/12  Status post laceration revision and hematoma drainage by ortho on 10/12  · Completed 3 day course of bactrim  · Continue wound care   · Orthopaedics recommends suture removal 2 weeks after initial placement - assess for removal on 10/25      Nurse Coordination of Care Discussion: Discussed assessment and plan with primary RN    Discussions with Specialists or Other Care Team Provider: None    Education and Discussions with Family / Patient: Answered patients questions to the best of my ability    Time Spent for Care: 30 minutes  More than 50% of total time spent on counseling and coordination of care as described above  Current Length of Stay: 3 day(s)    Code Status: Level 1 - Full Code      Subjective:   Patient complaining of urinary hesitancy and feeling like he is unable to completely empty his bladder  He reports urinary retention requiring winkler catheter insertion 2/2 to anticholinergic toxicity from benadryl overdose  States he has been able to void on his own but he does not feel like he is "back to normal" Also complaining of left sided jaw pain and headache       Objective:     Vitals:   Temp (24hrs), Av 1 °F (36 7 °C), Min:97 7 °F (36 5 °C), Max:98 5 °F (36 9 °C)    Temp:  [97 7 °F (36 5 °C)-98 5 °F (36 9 °C)] 97 7 °F (36 5 °C)  HR:  [65-81] 81  Resp:  [16] 16  BP: (123-155)/(68-75) 155/68  SpO2:  [91 %-97 %] 97 %  Body mass index is 24 47 kg/m²  Physical Exam:     Physical Exam  Constitutional:       Appearance: Normal appearance  He is normal weight  HENT:      Head: Normocephalic and atraumatic  Nose: Nose normal       Mouth/Throat:      Mouth: Mucous membranes are moist    Neurological:      Mental Status: He is alert  Additional Data:     Labs:    Results from last 7 days   Lab Units 10/17/22  0524   WBC Thousand/uL 5 73   HEMOGLOBIN g/dL 12 6   HEMATOCRIT % 38 4   PLATELETS Thousands/uL 203   NEUTROS PCT % 50   LYMPHS PCT % 32   MONOS PCT % 13*   EOS PCT % 4     Results from last 7 days   Lab Units 10/15/22  2337   SODIUM mmol/L 137   POTASSIUM mmol/L 3 4*   CHLORIDE mmol/L 101   CO2 mmol/L 27   BUN mg/dL 14   CREATININE mg/dL 0 77   ANION GAP mmol/L 9   CALCIUM mg/dL 9 2   ALBUMIN g/dL 4 4   TOTAL BILIRUBIN mg/dL 1 60*   ALK PHOS U/L 45   ALT U/L 43   AST U/L 92*   GLUCOSE RANDOM mg/dL 109*                     * I Have Reviewed All Lab Data Listed Above  * Additional Pertinent Lab Tests Reviewed:  All Labs Within Last 24 Hours Reviewed    Imaging:    Imaging Reports Reviewed Today Include: no imaging reviewed today      Last 24 Hours Medication List:   Current Facility-Administered Medications   Medication Dose Route Frequency Provider Last Rate   • acetaminophen  650 mg Oral Q6H PRN Kathleen Mccabe MD     • acetaminophen  650 mg Oral Q4H PRN Kathleen Mccabe MD     • acetaminophen  975 mg Oral Q6H PRN Kathleen Mccabe MD     • aluminum-magnesium hydroxide-simethicone  30 mL Oral Q4H PRN Kathleen Mccabe MD     • artificial tear  1 application Both Eyes R7G PRN Kathleen Mccabe MD     • haloperidol lactate  2 5 mg Intramuscular Q6H PRN Max 4/day Kathleen Mccabe MD      And   • LORazepam  1 mg Intramuscular Q6H PRN Max 4/day Kathleen Mccabe MD      And   • benztropine  0 5 mg Intramuscular Q6H PRN Max 4/day Sae Silverio MD     • haloperidol lactate  5 mg Intramuscular Q4H PRN Max 4/day Sae Silverio MD      And   • LORazepam  2 mg Intramuscular Q4H PRN Max 4/day Sae Silverio MD      And   • benztropine  1 mg Intramuscular Q4H PRN Max 4/day Sae Silverio MD     • benztropine  1 mg Intramuscular Q4H PRN Max 6/day Sae Silverio MD     • benztropine  1 mg Oral Q4H PRN Max 6/day Sae Silverio MD     • hydrOXYzine HCL  50 mg Oral Q6H PRN Max 4/day Sae Silverio MD      Or   • diphenhydrAMINE  50 mg Intramuscular Q6H PRN Sae Silverio MD     • haloperidol  2 mg Oral Q4H PRN Max 6/day Sae Silverio MD     • haloperidol  5 mg Oral Q6H PRN Max 4/day Sae Silverio MD     • haloperidol  5 mg Oral Q4H PRN Max 4/day Sae Silverio MD     • hydrOXYzine HCL  100 mg Oral Q6H PRN Max 4/day Sae Silverio MD      Or   • LORazepam  2 mg Intramuscular Q6H PRN Sae Silverio MD     • hydrOXYzine HCL  25 mg Oral Q6H PRN Max 4/day Sae Silverio MD     • melatonin  6 mg Oral HS Garnetta Saúl, DO     • nicotine  14 mg Transdermal Daily Garadria Saúl, DO     • polyethylene glycol  17 g Oral Daily PRN Sae Silverio MD     • prazosin  1 mg Oral HS Svetlana Nobles DO     • propranolol  10 mg Oral Q8H PRN Sae Silverio MD     • psyllium  1 packet Oral Daily Bossmana Saúl, DO     • senna-docusate sodium  1 tablet Oral Daily PRN Sae Silverio MD     • traZODone  50 mg Oral HS PRN Sae Silverio MD     • ziprasidone  20 mg Oral BID With Meals Kathy Chandler MD          Today, Patient Was Seen By: Krys Garibay      ** Please Note: Dictation voice to text software may have been used in the creation of this document   **

## 2022-10-19 PROBLEM — F22 PARANOIA (PSYCHOSIS) (HCC): Status: ACTIVE | Noted: 2022-10-19

## 2022-10-19 PROCEDURE — 99232 SBSQ HOSP IP/OBS MODERATE 35: CPT | Performed by: PSYCHIATRY & NEUROLOGY

## 2022-10-19 RX ADMIN — ACETAMINOPHEN 650 MG: 325 TABLET, FILM COATED ORAL at 11:34

## 2022-10-19 RX ADMIN — ZIPRASIDONE HYDROCHLORIDE 20 MG: 20 CAPSULE ORAL at 13:07

## 2022-10-19 RX ADMIN — ACETAMINOPHEN 975 MG: 325 TABLET, FILM COATED ORAL at 06:25

## 2022-10-19 RX ADMIN — POLYETHYLENE GLYCOL 3350 17 G: 17 POWDER, FOR SOLUTION ORAL at 10:13

## 2022-10-19 RX ADMIN — ZIPRASIDONE HYDROCHLORIDE 20 MG: 20 CAPSULE ORAL at 17:08

## 2022-10-19 RX ADMIN — ACETAMINOPHEN 975 MG: 325 TABLET, FILM COATED ORAL at 17:26

## 2022-10-19 RX ADMIN — MELATONIN TAB 3 MG 6 MG: 3 TAB at 21:19

## 2022-10-19 RX ADMIN — NICOTINE 14 MG: 14 PATCH, EXTENDED RELEASE TRANSDERMAL at 08:25

## 2022-10-19 RX ADMIN — PSYLLIUM HUSK 1 PACKET: 3.4 POWDER ORAL at 08:23

## 2022-10-19 NOTE — PLAN OF CARE
Problem: INFECTION - ADULT  Goal: Absence or prevention of progression during hospitalization  Description: INTERVENTIONS:  - Assess and monitor for signs and symptoms of infection  - Monitor lab/diagnostic results  - Monitor all insertion sites, i e  indwelling lines, tubes, and drains  - Monitor endotracheal if appropriate and nasal secretions for changes in amount and color  - [place] appropriate cooling/warming therapies per order  - Administer medications as ordered  - Instruct and encourage patient and family to use good hand hygiene technique  - Identify and instruct in appropriate isolation precautions for identified infection/condition  Outcome: Progressing  Goal: Absence of fever/infection during neutropenic period  Description: INTERVENTIONS:  - Monitor WBC    Outcome: Progressing     Problem: Ineffective Coping  Goal: Cooperates with admission process  Description: Interventions:   - Complete admission process  Outcome: Progressing  Goal: Identifies ineffective coping skills  Outcome: Progressing  Goal: Identifies healthy coping skills  Outcome: Progressing  Goal: Demonstrates healthy coping skills  Outcome: Progressing  Goal: Participates in unit activities  Description: Interventions:  - Provide therapeutic environment   - Provide required programming   - Redirect inappropriate behaviors   Outcome: Progressing  Goal: Patient/Family participate in treatment and DC plans  Description: Interventions:  - Provide therapeutic environment  Outcome: Progressing  Goal: Patient/Family verbalizes awareness of resources  Outcome: Progressing  Goal: Understands least restrictive measures  Description: Interventions:  - Utilize least restrictive behavior  Outcome: Progressing  Goal: Free from restraint events  Description: - Utilize least restrictive measures   - Provide behavioral interventions   - Redirect inappropriate behaviors   Outcome: Progressing     Problem: Risk for Self Injury/Neglect  Goal: Treatment Goal: Remain safe during length of stay, learn and adopt new coping skills, and be free of self-injurious ideation, impulses and acts at the time of discharge  Outcome: Progressing  Goal: Verbalize thoughts and feelings  Description: Interventions:  - Assess and re-assess patient's lethality and potential for self-injury  - Engage patient in 1:1 interactions, daily, for a minimum of 15 minutes  - Encourage patient to express feelings, fears, frustrations, hopes  - Establish rapport/trust with patient   Outcome: Progressing  Goal: Refrain from harming self  Description: Interventions:  - Monitor patient closely, per order  - Develop a trusting relationship  - Supervise medication ingestion, monitor effects and side effects   Outcome: Progressing  Goal: Recognize maladaptive responses and adopt new coping mechanisms  Outcome: Progressing  Goal: Complete daily ADLs, including personal hygiene independently, as able  Description: Interventions:  - Observe, teach, and assist patient with ADLS  - Monitor and promote a balance of rest/activity, with adequate nutrition and elimination  Outcome: Progressing     Problem: Depression  Goal: Treatment Goal: Demonstrate behavioral control of depressive symptoms, verbalize feelings of improved mood/affect, and adopt new coping skills prior to discharge  Outcome: Progressing  Goal: Verbalize thoughts and feelings  Description: Interventions:  - Assess and re-assess patient's level of risk   - Engage patient in 1:1 interactions, daily, for a minimum of 15 minutes   - Encourage patient to express feelings, fears, frustrations, hopes   Outcome: Progressing  Goal: Refrain from harming self  Description: Interventions:  - Monitor patient closely, per order   - Supervise medication ingestion, monitor effects and side effects   Outcome: Progressing  Goal: Refrain from isolation  Description: Interventions:  - Develop a trusting relationship   - Encourage socialization   Outcome: Progressing  Goal: Refrain from self-neglect  Outcome: Progressing  Goal: Complete daily ADLs, including personal hygiene independently, as able  Description: Interventions:  - Observe, teach, and assist patient with ADLS  -  Monitor and promote a balance of rest/activity, with adequate nutrition and elimination   Outcome: Progressing     Problem: Anxiety  Goal: Anxiety is at manageable level  Description: Interventions:  - Assess and monitor patient's anxiety level  - Monitor for signs and symptoms (heart palpitations, chest pain, shortness of breath, headaches, nausea, feeling jumpy, restlessness, irritable, apprehensive)  - Collaborate with interdisciplinary team and initiate plan and interventions as ordered    - [place] patient to unit/surroundings  - Explain treatment plan  - Encourage participation in care  - Encourage verbalization of concerns/fears  - Identify coping mechanisms  - Assist in developing anxiety-reducing skills  - Administer/offer alternative therapies  - Limit or eliminate stimulants  Outcome: Progressing

## 2022-10-19 NOTE — NURSING NOTE
Pt approached nursing station with c/o left arm pain rated 8-10  Pt reports "stress fractures"  Administered PRN tylenol 975 mg at 0625

## 2022-10-19 NOTE — NURSING NOTE
Patient visible on the unit to make needs known and to socialize briefly with staff and peers, otherwise isolative to self in room  Patient cooperative with assessment questions, currently reporting 1/4 anxiety and denies depression SI/HI/AVH  Patient reporting mild intermittent pain the left arm, requesting PRN Tylenol and administered at 1726 for 7/10 pain  Patient reports this was moderately effective  Patient refusing HS minipress, otherwise compliant with medications and mouth checks  Appetite good at meals  No further signs of distress noted  VSS

## 2022-10-19 NOTE — CASE MANAGEMENT
Spoke to patient over the phone regarding services that he would like   Patient would like help with applying for temporary disability, setting him up with a ride home when he leaves (lysofy), helping him find a therapist and a psychiatrist in [place], maybe a long term [address] [place] [address] facility, a nutritionist, and possibly partial program      Called and obtained the following numbers for patient to call and use for reference (gave to patient):    [address] [phone]- temporary disability  [address] [phone] - OP mental health & suicide prevention  Crisis Hotline for [place]  [phone]

## 2022-10-19 NOTE — PROGRESS NOTES
Progress Note - Guillermoreinaldo Alvarezjose Reid 33 y o  male MRN: [id number]  Unit/Bed#: [facility] 934-67 Encounter: [id number]    Assessment/Plan   Principal Problem:    Paranoia (psychosis) ([facility] [address] [facility] )  Active Problems:    Wrist laceration    PTSD (post-traumatic stress disorder)    COVID    Medical clearance for psychiatric admission    Major depression with psychotic features ([facility] )    Urinary hesitancy      Recommended Treatment/PLAN:  1  Geodon 20 mg  PO BID with food   Continue with pharmacotherapy, group therapy, milieu therapy and occupational therapy  Continue to assess for adverse medication side effects  Encourage Namita Mcintyre to participate in nonverbal forms of therapy including journaling and art/music therapy  Continue frequent safety checks and vitals per unit protocol  Continue to engage CM/SW to assist with collateral, disposition planning, and the implementation of an individualized, patient-centered plan of care  Continue medical management by medical team   Case discussed with treatment team     Legal Status: 201  ------------------------------------------------------------    Subjective: All documentation including nursing notes, medication history to ensure medication adherence on the unit, labs, and vitals were reviewed  Millicent Maynard was evaluated this morning for continuity  It is noted that he has been social on the COVID unit and denying most if not all psychiatric symptoms  He refused the Geodon last night and has told staff that he does not need an antipsychotic  This AM, I let him know that he DOES in fact need an antipsychotic  He has paranoid delusions, some of them somatic -- he is worried that any medication will somehow interact with chemicals that are being put into his body from some outside, sinister force  In addition, he is walking around with ice on his left forearm and tells me that he believes he has stress fractures   I asked him why he thinks that and he replied that he heard popping noises when he was trying to sleep last night  I asked him if he thought he needed an xray and he told me that he fracture probably wouldn't show up on an xray  Valentin Essex is trying to control his treatment  He minimizes the suicide attempt, telling me "   that was in the past " He agrees that he has a mental illness, but believes he can take care of it using his own methods (yoga and other alternative measures  ) He continues not to want to share anything about his trauma  Valentin Essex did not appear to be in any acute distress  Over the past 24 hours per nursing report, Valentin Essex has been cooperative on the unit and not compliant with medications  Today, Valentin Essex is consenting for safety on the unit  Valentin Essex reports feeling "so so " Valentin Essex notes having adequate sleep  Valentin Essex states having a good appetite  Valentin Essex has not been  taking the medications as prescribed  Patient was seen and examined today on the COVID unit, in an empty room     We discussed medications and he has very reluctantly agreed to try the Geodon  I let him know that the suicide attempt was a very serious one and that if he continues to refuse medication,converting the 201 to a 302 is a possibility  Valentin Essex denies suicidal ideations  Valentin Essex denies homicidal ideations  Regarding hallucinations, Valentin Essex denies auditory or visual hallucinations  PRNs overnight: none   VS: Reviewed, within normal limits    Progress Toward Goals: Pt is still unwilling to take medications  Paranoid and somatic delusions persist    Psychiatric Review of Systems:  Behavior over the last 24 hours:  unchanged  Sleep: normal  Appetite: normal  Medication side effects: Yes - he believes that any and all medications will cause side effects  ROS: Pt believes that he has stress fractures of the left forearm  He also believes that chemicals and toxins are being put into his body by an outside force      Vital signs in last 24 hours:  Temp:  [96 7 °F (35 9 °C)-98 °F (36 7 °C)] 96 7 °F (35 9 °C)  HR:  [65-81] 65  Resp:  [16-17] 16  BP: (129-155)/(58-75) 136/75    Laboratory results:  I have personally reviewed all pertinent laboratory/tests results    Recent Results (from the past 48 hour(s))   ECG 12 lead    Collection Time: 10/18/22 12:58 PM   Result Value Ref Range    Ventricular Rate 67 BPM    Atrial Rate 67 BPM    TN Interval 146 ms    QRSD Interval 76 ms    QT Interval 394 ms    QTC Interval 416 ms    P Axis 33 degrees    QRS Axis 54 degrees    T Wave Axis 38 degrees   Comprehensive metabolic panel    Collection Time: 10/18/22  1:11 PM   Result Value Ref Range    Sodium 137 135 - 147 mmol/L    Potassium 4 0 3 5 - 5 3 mmol/L    Chloride 101 96 - 108 mmol/L    CO2 29 21 - 32 mmol/L    ANION GAP 7 5 - 14 mmol/L    BUN 7 5 - 25 mg/dL    Creatinine 0 70 0 70 - 1 50 mg/dL    Glucose 100 (H) 70 - 99 mg/dL    Calcium 9 1 8 4 - 10 2 mg/dL    AST 37 17 - 59 U/L    ALT 44 <50 U/L    Alkaline Phosphatase 44 43 - 122 U/L    Total Protein 7 2 6 4 - 8 4 g/dL    Albumin 4 4 3 5 - 5 0 g/dL    Total Bilirubin 0 77 0 20 - 1 00 mg/dL    eGFR 125 ml/min/1 73sq m   UA w Reflex to Microscopic w Reflex to Culture    Collection Time: 10/18/22  1:13 PM    Specimen: Urine, Clean Catch   Result Value Ref Range    Color, UA Straw Straw, Yellow, Pale Yellow    Clarity, UA Clear Clear, Other    Specific Gravity, UA 1 005 1 003 - 1 040    pH, UA 7 0 4 5, 5 0, 5 5, 6 0, 6 5, 7 0, 7 5, 8 0    Leukocytes, UA Negative Negative    Nitrite, UA Negative Negative    Protein, UA Negative Negative mg/dl    Glucose, UA Negative Negative mg/dl    Ketones, UA Negative Negative mg/dl    Bilirubin, UA Negative Negative    Occult Blood, UA Negative Negative    UROBILINOGEN UA Negative 1 0, Negative mg/dL         Mental Status Evaluation:    Appearance:  disheveled, dressed in hospital attire, looks stated age, poor hygiene   Behavior:  calm, passive aggressive, guarded, grandiose   Speech:  normal rate and volume, perseverative   Mood:  "so-so"   Affect:  constricted   Thought Process:  illogical, but goal directed   Associations: intact associations   Thought Content:  grandiose, paranoid and somatic delusions   Perceptual Disturbances: Denies auditory or visual hallucinations and Does not appear to be responding to internal stimuli   Risk Potential: Suicidal ideation - None at present  Homicidal ideation - None  Potential for aggression - No   Sensorium:  oriented to person, place and time/date   Memory:  recent and remote memory grossly intact   Consciousness:  alert and awake   Attention/Concentration: attention span and concentration are age appropriate   Insight:  impaired   Judgment: impaired   Gait/Station: normal gait/station   Motor Activity: no abnormal movements       Current Medications:  Current Facility-Administered Medications   Medication Dose Route Frequency Provider Last Rate   • acetaminophen  650 mg Oral Q6H PRN Jr Crow MD     • acetaminophen  650 mg Oral Q4H PRN Jr Crow MD     • acetaminophen  975 mg Oral Q6H PRN Jr Crow MD     • aluminum-magnesium hydroxide-simethicone  30 mL Oral Q4H PRN Jr Crow MD     • artificial tear  1 application Both Eyes O7J PRN Jr Crow MD     • haloperidol lactate  2 5 mg Intramuscular Q6H PRN Max 4/day Jr Crow MD      And   • LORazepam  1 mg Intramuscular Q6H PRN Max 4/day Jr Corw MD      And   • benztropine  0 5 mg Intramuscular Q6H PRN Max 4/day Jr Crow MD     • haloperidol lactate  5 mg Intramuscular Q4H PRN Max 4/day Jr Crow MD      And   • LORazepam  2 mg Intramuscular Q4H PRN Max 4/day Jr Crow MD      And   • benztropine  1 mg Intramuscular Q4H PRN Max 4/day Jr Crow MD     • benztropine  1 mg Intramuscular Q4H PRN Max 6/day Jr Crow MD     • benztropine  1 mg Oral Q4H PRN Max 6/day Dayanna Potter Tia Perez MD     • hydrOXYzine HCL  50 mg Oral Q6H PRN Max 4/day Maggy Pelletier MD      Or   • diphenhydrAMINE  50 mg Intramuscular Q6H PRN Maggy Pelletier MD     • haloperidol  2 mg Oral Q4H PRN Max 6/day Maggy Pelletier MD     • haloperidol  5 mg Oral Q6H PRN Max 4/day Maggy Pelletier MD     • haloperidol  5 mg Oral Q4H PRN Max 4/day Maggy Pelletier MD     • hydrOXYzine HCL  100 mg Oral Q6H PRN Max 4/day Maggy Pelletier MD      Or   • LORazepam  2 mg Intramuscular Q6H PRN Maggy Pelletier MD     • hydrOXYzine HCL  25 mg Oral Q6H PRN Max 4/day Maggy Pelletier MD     • melatonin  6 mg Oral HS Ismael Candelario, DO     • nicotine  14 mg Transdermal Daily Ismael Candelario DO     • polyethylene glycol  17 g Oral Daily PRN Maggy Pelletier MD     • prazosin  1 mg Oral HS Eduar Brady, DO     • propranolol  10 mg Oral Q8H PRN Maggy Pelletier MD     • psyllium  1 packet Oral Daily Ismael Candelario DO     • senna-docusate sodium  1 tablet Oral Daily PRN Maggy Pelletier MD     • traZODone  50 mg Oral HS PRN Maggy Pelletier MD     • ziprasidone  20 mg Oral BID With Meals Adamaris Vinson MD         Suicide/Homicide Risk Assessment:  Risk of Harm to Self:   Based on today's assessment, Tenna Ink presents the following risk of harm to self: low    Risk of Harm to Others:  Based on today's assessment, Tenna Ink presents the following risk of harm to others: low    The following interventions are recommended: behavioral checks every 7 minutes, continued hospitalization on locked unit    Behavioral Health Medications: Pt will start taking Geodon    Risks, benefits and possible side effects of Medications:   Risks, benefits, and possible side effects of medications explained to patient and patient verbalizes understanding  Counseling / Coordination of Care:  Patient's progress discussed with staff in treatment team meeting    Medications, treatment progress and treatment plan reviewed with patient  aMxime Robin 10/19/22      This note was completed in part utilizing [employer] Software  Grammatical, translation, syntax errors, random word insertions, spelling mistakes, and incomplete sentences may be an occasional consequence of this system secondary to software limitations with voice recognition, ambient noise, and hardware issues  If you have any questions or concerns about the content, text, or information contained within the body of this dictation, please contact the provider for clarification

## 2022-10-19 NOTE — NURSING NOTE
Patient is calm, visible, and pleasant on approach  Patient refused am Dalphine Holding but was agreed to take it in the afternoon  Patient is compliant, and cooperative with mouth checks  Patient reports anxiety 2/4, denies IS , AH, VH, and depression  Patient c/o left arm pain, PRN tylenol given  Patient resting comfortably in bed, will continue to monitor

## 2022-10-19 NOTE — PROGRESS NOTES
10/19/22 1116   Team Meeting   Meeting Type Daily Rounds   Initial Conference Date 10/19/22   Next Conference Date 10/20/22   Team Members Present   Team Members Present Physician;Nurse;;Occupational Therapist   Patient/Family Present   Patient Present No   Patient's Family Present No     Dr Osiris Painter, Dr Yury Murray, Dr Voncille Schirmer, S  [facility] - 1/4, [place] SI, social, cooperative, refusing meds, does not think he needs anti-psychotics, invested in his delusions, they will need to convert to a 302 if he continues to refuse meds and will be talked to about that

## 2022-10-20 PROCEDURE — 99232 SBSQ HOSP IP/OBS MODERATE 35: CPT | Performed by: PSYCHIATRY & NEUROLOGY

## 2022-10-20 RX ADMIN — ZIPRASIDONE HYDROCHLORIDE 20 MG: 20 CAPSULE ORAL at 08:39

## 2022-10-20 RX ADMIN — ACETAMINOPHEN 975 MG: 325 TABLET, FILM COATED ORAL at 19:10

## 2022-10-20 RX ADMIN — ZIPRASIDONE HYDROCHLORIDE 20 MG: 20 CAPSULE ORAL at 17:20

## 2022-10-20 RX ADMIN — ACETAMINOPHEN 975 MG: 325 TABLET, FILM COATED ORAL at 12:27

## 2022-10-20 RX ADMIN — PSYLLIUM HUSK 1 PACKET: 3.4 POWDER ORAL at 08:39

## 2022-10-20 RX ADMIN — ACETAMINOPHEN 975 MG: 325 TABLET, FILM COATED ORAL at 05:05

## 2022-10-20 RX ADMIN — NICOTINE 14 MG: 14 PATCH, EXTENDED RELEASE TRANSDERMAL at 08:42

## 2022-10-20 NOTE — NURSING NOTE
Patient is visible in the milieu, pleasant, and social  Patient is medication compliant, and cooperative with mouth checks  Patient denies SI, AH, VH, depression  Patient reports anxiety 1 5/4  Patient c/o left arm pain rated 7/10  PRN tylenol given at 1227  No behavioral issues noted  Will continue to monitor

## 2022-10-20 NOTE — NURSING NOTE
Pt is calm and cooperative  Pt has complaints of dry mouth, otherwise denies all s/s  Pt is pleasant, social and medication compliant  Mouth check completed

## 2022-10-20 NOTE — PROGRESS NOTES
10/20/22 1600   Team Meeting   Meeting Type Daily Rounds   Initial Conference Date 10/20/22   Next Conference Date 10/21/22   Team Members Present   Team Members Present Physician;Nurse;;; Occupational Therapist   Patient/Family Present   Patient Present No   Patient's Family Present No     JOSEFINA Presley - 1/4 anx, denies all else, refused med geodon

## 2022-10-20 NOTE — PROGRESS NOTES
Progress Note - Kaykay Austin Jimena Reid 33 y o  male MRN: [id number]  Unit/Bed#: Christen Escobar 684-87 Encounter: [id number]    Assessment/Plan   Principal Problem:    Paranoia (psychosis) ([facility] )  Active Problems:    Wrist laceration    PTSD (post-traumatic stress disorder)    COVID    Medical clearance for psychiatric admission    Major depression with psychotic features ([facility] )    Urinary hesitancy      Recommended Treatment/PLAN:  1  Continue Geodon 20 mg  PO BID with meals  Continue with pharmacotherapy, group therapy, milieu therapy and occupational therapy  Continue to assess for adverse medication side effects  Encourage Arias Delacruz to participate in nonverbal forms of therapy including journaling and art/music therapy  Continue frequent safety checks and vitals per unit protocol  Continue to engage CM/SW to assist with collateral, disposition planning, and the implementation of an individualized, patient-centered plan of care  Continue medical management by medical team   Case discussed with treatment team     Legal Status: 201  ------------------------------------------------------------    Subjective: All documentation including nursing notes, medication history to ensure medication adherence on the unit, labs, and vitals were reviewed  Deborah Pastor was evaluated this morning for continuity of care  He was sitting up in bed, reading a book  He DID take the medication and tells me that for the first night in a long time, he did not have nightmares  He does not notice any side effects except for some tiredness  Deborah Pastor is still not comfortable talking to me about the trauma he has experienced at work  He DID tell me that he believes that there is an issue of "occupational safety" which translates into being afraid of some repercussions if he tells me (paranoia  )Deborah Pastor is in no acute distress   Over the past 24 hours per nursing report, Deborah Pastor has been cooperative on the unit and compliant with medications  Today, Nallely Falcon is consenting for safety on the unit  Nallely Falcon reports feeling "ok " Nallely Falcon notes having good sleep  Nallely Falcon states having a good appetite  Nallely Falcon has been  taking the medications as prescribed and reporting minimal side effects  Patient was seen and examined today in his room  We discussed How he is feeling and what he is thinking about  Nallely Falcon denies suicidal ideations  Nallely Falcon denies homicidal ideations  Regarding hallucinations, Nallely Falcon denies suicidal and homicidal ideation  PRNs overnight: none   VS: Reviewed, within normal limits    Progress Toward Goals:  improvement    Psychiatric Review of Systems:  Behavior over the last 24 hours:  improved  Sleep: normal  Appetite: normal  Medication side effects: some tiredness   ROS: no complaints    Vital signs in last 24 hours:  Temp:  [97 4 °F (36 3 °C)-98 5 °F (36 9 °C)] 97 6 °F (36 4 °C)  HR:  [53-78] 64  Resp:  [16-18] 18  BP: (131-156)/(61-72) 145/72    Laboratory results:  I have personally reviewed all pertinent laboratory/tests results  No results found for this or any previous visit (from the past 48 hour(s))      Mental Status Evaluation:    Appearance:  wearing hospital clothes, looks stated age, bearded   Behavior:  cooperative, calm, still evasive   Speech:  normal rate and volume   Mood:  "ok"   Affect:  normal range and intensity   Thought Process:  as well as illogical in some areas   Associations: intact associations   Thought Content:  some paranoia   Perceptual Disturbances: Denies auditory or visual hallucinations   Risk Potential: Suicidal ideation - None at present  Homicidal ideation - None  Potential for aggression - No   Sensorium:  oriented to person, place and time/date   Memory:  recent and remote memory grossly intact   Consciousness:  alert and awake   Attention/Concentration: attention span and concentration are age appropriate   Insight:  impaired   Judgment: improving   Gait/Station: normal gait/station   Motor Activity: no abnormal movements       Current Medications:  Current Facility-Administered Medications   Medication Dose Route Frequency Provider Last Rate   • acetaminophen  650 mg Oral Q6H PRN Sravani Ken MD     • acetaminophen  650 mg Oral Q4H PRN Sravani Ken MD     • acetaminophen  975 mg Oral Q6H PRN Sravani Ken MD     • aluminum-magnesium hydroxide-simethicone  30 mL Oral Q4H PRN Sravani Ken MD     • artificial tear  1 application Both Eyes R2X PRN Sravani Ken MD     • haloperidol lactate  2 5 mg Intramuscular Q6H PRN Max 4/day Sravani Ken MD      And   • LORazepam  1 mg Intramuscular Q6H PRN Max 4/day Sravani Ken MD      And   • benztropine  0 5 mg Intramuscular Q6H PRN Max 4/day Sravani Ken MD     • haloperidol lactate  5 mg Intramuscular Q4H PRN Max 4/day Sravani Ken MD      And   • LORazepam  2 mg Intramuscular Q4H PRN Max 4/day Sravani Ken MD      And   • benztropine  1 mg Intramuscular Q4H PRN Max 4/day Sravani Ken MD     • benztropine  1 mg Intramuscular Q4H PRN Max 6/day Sravani Ken MD     • benztropine  1 mg Oral Q4H PRN Max 6/day Sravani Ken MD     • hydrOXYzine HCL  50 mg Oral Q6H PRN Max 4/day Sravani Ken MD      Or   • diphenhydrAMINE  50 mg Intramuscular Q6H PRN Sravani Ken MD     • haloperidol  2 mg Oral Q4H PRN Max 6/day Sravani Ken MD     • haloperidol  5 mg Oral Q6H PRN Max 4/day Sravani Ken MD     • haloperidol  5 mg Oral Q4H PRN Max 4/day Sravani Ken MD     • hydrOXYzine HCL  100 mg Oral Q6H PRN Max 4/day Sravani Ken MD      Or   • LORazepam  2 mg Intramuscular Q6H PRN Sravani Ken MD     • hydrOXYzine HCL  25 mg Oral Q6H PRN Max 4/day Sravani Ken MD     • melatonin  6 mg Oral HS Ilene Heard DO     • nicotine  14 mg Transdermal Daily Ilene Heard DO • polyethylene glycol  17 g Oral Daily PRN Reece Pena MD     • prazosin  1 mg Oral HS Julian Brady, DO     • propranolol  10 mg Oral Q8H PRN Reece Pena MD     • psyllium  1 packet Oral Daily Artur Florez DO     • senna-docusate sodium  1 tablet Oral Daily PRN Reece Pena MD     • traZODone  50 mg Oral HS PRN Reece Pena MD     • ziprasidone  20 mg Oral BID With Meals Adamaris Hurst MD         Suicide/Homicide Risk Assessment:  Risk of Harm to Self:   Based on today's assessment, Nury Padilla presents the following risk of harm to self: low    Risk of Harm to Others:  Based on today's assessment, Nury Padilla presents the following risk of harm to others: low    The following interventions are recommended: behavioral checks every 7 minutes, continued hospitalization on locked unit    Behavioral Health Medications: All current active meds have been reviewed  Risks, benefits and possible side effects of Medications:   Risks, benefits, and possible side effects of medications explained to patient and patient verbalizes understanding  Counseling / Coordination of Care:  Patient's progress discussed with staff in treatment team meeting  Medications, treatment progress and treatment plan reviewed with patient  Alon Juniorbrea 10/20/22      This note was completed in part utilizing M-Modal Fluency Direct Software  Grammatical, translation, syntax errors, random word insertions, spelling mistakes, and incomplete sentences may be an occasional consequence of this system secondary to software limitations with voice recognition, ambient noise, and hardware issues  If you have any questions or concerns about the content, text, or information contained within the body of this dictation, please contact the provider for clarification

## 2022-10-21 PROCEDURE — 99232 SBSQ HOSP IP/OBS MODERATE 35: CPT | Performed by: PSYCHIATRY & NEUROLOGY

## 2022-10-21 RX ORDER — IBUPROFEN 200 MG
200 TABLET ORAL EVERY 6 HOURS PRN
Status: DISCONTINUED | OUTPATIENT
Start: 2022-10-21 | End: 2022-10-27 | Stop reason: HOSPADM

## 2022-10-21 RX ORDER — IBUPROFEN 400 MG/1
400 TABLET ORAL EVERY 6 HOURS PRN
Status: DISCONTINUED | OUTPATIENT
Start: 2022-10-21 | End: 2022-10-27 | Stop reason: HOSPADM

## 2022-10-21 RX ADMIN — ZIPRASIDONE HYDROCHLORIDE 20 MG: 20 CAPSULE ORAL at 08:17

## 2022-10-21 RX ADMIN — IBUPROFEN 400 MG: 400 TABLET ORAL at 19:20

## 2022-10-21 RX ADMIN — ACETAMINOPHEN 650 MG: 325 TABLET, FILM COATED ORAL at 12:31

## 2022-10-21 RX ADMIN — PSYLLIUM HUSK 1 PACKET: 3.4 POWDER ORAL at 08:17

## 2022-10-21 RX ADMIN — NICOTINE 14 MG: 14 PATCH, EXTENDED RELEASE TRANSDERMAL at 08:18

## 2022-10-21 RX ADMIN — HYDROXYZINE HYDROCHLORIDE 50 MG: 50 TABLET, FILM COATED ORAL at 22:16

## 2022-10-21 RX ADMIN — ZIPRASIDONE HYDROCHLORIDE 20 MG: 20 CAPSULE ORAL at 16:32

## 2022-10-21 RX ADMIN — ACETAMINOPHEN 975 MG: 325 TABLET, FILM COATED ORAL at 05:11

## 2022-10-21 NOTE — WOUND OSTOMY CARE
Progress Note - Wound   Coleman Markham 33 y o  male MRN: [id number]  Unit/Bed#: Aly Bonilla 659-90 Encounter: [id number]    Wound Ostomy Care        Patient seen at [facility] on Friday before he was transferred to [facility], orders were reconciled and placed during transfer  Seen today for continued care of wrist wound                Assessment:   1)Right arm sutures intact  Slight edema along lateal side of incision  Xeroform and dsd removed, no drainage on dressing or at site  2)Left arm Incision clean dry well approximated  No drainage on previous dressing or at site  Wound 10/12/22 Laceration Wrist Right;Left (Active)   Wound Image   10/21/22 1332   Wound Description Dry;Clean; Intact 10/21/22 1332   Oumou-wound Assessment Swelling 10/21/22 1332   Drainage Amount None 10/21/22 1332   Treatments Cleansed;Site care 10/21/22 1332   Dressing ABD;Xeroform 10/21/22 1332   Dressing Changed Changed 10/21/22 1332   Patient Tolerance Tolerated well 10/21/22 1332   Dressing Status Clean; Intact;Dry 10/21/22 1332           Wound 10/21/22 Arm Anterior;Distal;Right; Lower (Active)   Wound Image   10/21/22 1333   Wound Description Dry; Intact 10/21/22 1333   Oumou-wound Assessment Intact;Dry;Clean 10/21/22 1333   Drainage Amount None 10/21/22 1333   Treatments Site care;Cleansed 10/21/22 1333   Dressing ABD;Xeroform 10/21/22 1333   Dressing Changed Changed 10/21/22 1333   Patient Tolerance Tolerated well 10/21/22 1333   Dressing Status Clean;Dry; Intact 10/21/22 1333   Reviewed findings and plan of care with LPN    Skin care plans:   1-Cleanse b/l arm wounds with normal saline, apply xeroform gauze over suture lines, cover with 1/2 ABD, secure with alejandro and tape  ACE wrap to secure and lightly wrapped for compression  Change every other day and PRN   2-Elevate heels to offload pressure  3-Ehob cushion in chair when out of bed     4-Moisturize skin daily with skin nourishing cream    5-Turn/reposition q2h or when medically stable for pressure re-distribution on skin        Call or tigertext with any questions  Wound Care will continue to follow  Adal ROLON RN

## 2022-10-21 NOTE — PROGRESS NOTES
Progress Note - Guillermoclivegilmar Reid 33 y o  male MRN: [id number]  Unit/Bed#: Carla Faust 588-57 Encounter: [id number]    Assessment/Plan   Principal Problem:    Paranoia (psychosis) ([facility] )  Active Problems:    Wrist laceration    PTSD (post-traumatic stress disorder)    COVID    Medical clearance for psychiatric admission    Major depression with psychotic features ([facility] )    Urinary hesitancy      Recommended Treatment/PLAN:  1  Continue Geodon 20 mg  PO BID  2  Discontinue PRN trazodone  No psychopharmacologic changes necessary at this moment; will continue to assess daily for further optimization  Continue with pharmacotherapy, group therapy, milieu therapy and occupational therapy  Continue to assess for adverse medication side effects  Encourage Phoenix Brooks to participate in nonverbal forms of therapy including journaling and art/music therapy  Continue frequent safety checks and vitals per unit protocol  Continue to engage CM/SW to assist with collateral, disposition planning, and the implementation of an individualized, patient-centered plan of care  Continue medical management by medical team   Case discussed with treatment team     Legal Status: 201  ------------------------------------------------------------    Subjective: All documentation including nursing notes, medication history to ensure medication adherence on the unit, labs, and vitals were reviewed  Rosa Miguel was evaluated this afternoon for continuity of care  He continues to verbalize appreciation for the Geodon  He likes the fact that he does not have nightmares as well as the relative lack of side effects  He continues to complain of pain in his arms and is using ice  He asked if the Tylenol could be switched to ibuprofen because he believes it is more effective  Lai's affect continues bright and no acute distress was noted throughout the evaluation   Over the past 24 hours per nursing report, Rosa Miguel has been cooperative on the unit and compliant with medications  Today, Vaughn Jasso is consenting for safety on the unit  Vaughn Jasso reports feeling "ok " Vaughn Jasso notes having good sleep  Vaughn Jasso states having a good appetite  Vaughn Jasso has been  taking the medications as prescribed and reporting no side effects  Patient was seen and examined today in his room  We discussed how he is feeling and what is on his mind  Vaughn Jasso denies suicidal ideations  Vaughn Jasso denies homicidal ideations  Regarding hallucinations, Vaughn Jasso denies the presence of auditory or visual hallucinations  PRNs overnight: none and he would like the melatonin discontinued   VS: Reviewed, within normal limits    Progress Toward Goals:  improvement    Psychiatric Review of Systems:  Behavior over the last 24 hours:  improved  Sleep: normal  Appetite: normal  Medication side effects: No   ROS: he complains of pain in his arms    Vital signs in last 24 hours:  Temp:  [97 6 °F (36 4 °C)-98 °F (36 7 °C)] 98 °F (36 7 °C)  HR:  [60-64] 60  Resp:  [18] 18  BP: (120-151)/(68-72) 120/68    Laboratory results:  I have personally reviewed all pertinent laboratory/tests results  No results found for this or any previous visit (from the past 48 hour(s))        Mental Status Evaluation:    Appearance:  adequate grooming, wearing hospital clothes, bearded   Behavior:  pleasant, cooperative, calm, and bright   Speech:  normal rate and volume, clear, coherent   Mood:  "ok"   Affect:  overbright   Thought Process:  coherent, goal directed, normal rate of thoughts   Associations: intact associations   Thought Content:  Persecutory and paranoid   Perceptual Disturbances: Denies auditory or visual hallucinations   Risk Potential: Suicidal ideation - None at present  Homicidal ideation - None  Potential for aggression - Not at present   Sensorium:  oriented to person, place and time/date   Memory:  recent and remote memory grossly intact   Consciousness:  alert and awake Attention/Concentration: attention span and concentration are age appropriate   Insight:  impaired   Judgment: limited   Gait/Station: normal gait/station   Motor Activity: no abnormal movements       Current Medications:  Current Facility-Administered Medications   Medication Dose Route Frequency Provider Last Rate   • aluminum-magnesium hydroxide-simethicone  30 mL Oral Q4H PRN Lindsay Mcarthur MD     • artificial tear  1 application Both Eyes W0W PRN Lindsay Mcarthur MD     • haloperidol lactate  2 5 mg Intramuscular Q6H PRN Max 4/day Lindsay Mcarthur MD      And   • LORazepam  1 mg Intramuscular Q6H PRN Max 4/day Lindsay Mcarthur MD      And   • benztropine  0 5 mg Intramuscular Q6H PRN Max 4/day Lindsay Mcarthur MD     • haloperidol lactate  5 mg Intramuscular Q4H PRN Max 4/day Lindsay Mcarthur MD      And   • LORazepam  2 mg Intramuscular Q4H PRN Max 4/day Lindsay Mcarthur MD      And   • benztropine  1 mg Intramuscular Q4H PRN Max 4/day Lindsay Mcarthur MD     • benztropine  1 mg Intramuscular Q4H PRN Max 6/day Lindsay Mcarthur MD     • benztropine  1 mg Oral Q4H PRN Max 6/day Lindsay Mcarthur MD     • hydrOXYzine HCL  50 mg Oral Q6H PRN Max 4/day Lindsay Mcarthur MD      Or   • diphenhydrAMINE  50 mg Intramuscular Q6H PRN Lindsay Mcarthur MD     • haloperidol  2 mg Oral Q4H PRN Max 6/day Lindsay Mcarthur MD     • haloperidol  5 mg Oral Q6H PRN Max 4/day Lindsay Mcarthur MD     • haloperidol  5 mg Oral Q4H PRN Max 4/day Lindsay Mcarthur MD     • hydrOXYzine HCL  100 mg Oral Q6H PRN Max 4/day Lindsay Mcarthur MD      Or   • LORazepam  2 mg Intramuscular Q6H PRN Lindsay Mcarthur MD     • hydrOXYzine HCL  25 mg Oral Q6H PRN Max 4/day Lindsay Mcarthur MD     • ibuprofen  200 mg Oral Q6H PRN Jany Lynne MD     • ibuprofen  400 mg Oral Q6H PRN Jany Lynne MD     • melatonin  6 mg Oral HS Whitney Priest, DO • nicotine  14 mg Transdermal Daily Letty Early DO     • polyethylene glycol  17 g Oral Daily PRN Reginald Yadav MD     • prazosin  1 mg Oral HS Una Brady, DO     • propranolol  10 mg Oral Q8H PRN Reginald Yadav MD     • psyllium  1 packet Oral Daily Letty Early DO     • senna-docusate sodium  1 tablet Oral Daily PRN Reginald Yadav MD     • traZODone  50 mg Oral HS PRN Reginald Yadav MD     • ziprasidone  20 mg Oral BID With Meals Adamaris Ricardo MD         Suicide/Homicide Risk Assessment:  Risk of Harm to Self:   Based on today's assessment, [place] presents the following risk of harm to self: low    Risk of Harm to Others:  Based on today's assessment, [place] presents the following risk of harm to others: low    The following interventions are recommended: behavioral checks every 7 minutes, continued hospitalization on locked unit    Behavioral Health Medications: All current active meds have been reviewed  Risks, benefits and possible side effects of Medications:   Risks, benefits, and possible side effects of medications explained to patient and patient verbalizes understanding  Counseling / Coordination of Care:  Patient's progress discussed with staff in treatment team meeting  Medications, treatment progress and treatment plan reviewed with patient  Edel Ramirez 10/21/22      This note was completed in part utilizing M-Modal Fluency Direct Software  Grammatical, translation, syntax errors, random word insertions, spelling mistakes, and incomplete sentences may be an occasional consequence of this system secondary to software limitations with voice recognition, ambient noise, and hardware issues  If you have any questions or concerns about the content, text, or information contained within the body of this dictation, please contact the provider for clarification

## 2022-10-21 NOTE — NURSING NOTE
Patient is bright approach, visible, and social  Patient is medication compliant, and cooperative with mouth checks  Patient denies SI, AH, VH  Patient reports no anxiety, and no depression  Patient c/o left arm arm pain rated 5/10  PRN tylenol given at 1231, patient stated partially effective  No behavioral issues noted  Will continue to monitor

## 2022-10-21 NOTE — PLAN OF CARE
Problem: Ineffective Coping  Goal: Identifies ineffective coping skills  Outcome: Progressing  Goal: Identifies healthy coping skills  Outcome: Progressing  Goal: Demonstrates healthy coping skills  Outcome: Progressing  Goal: Understands least restrictive measures  Description: Interventions:  - Utilize least restrictive behavior  Outcome: Progressing  Goal: Free from restraint events  Description: - Utilize least restrictive measures   - Provide behavioral interventions   - Redirect inappropriate behaviors   Outcome: Progressing     Problem: Risk for Self Injury/Neglect  Goal: Treatment Goal: Remain safe during length of stay, learn and adopt new coping skills, and be free of self-injurious ideation, impulses and acts at the time of discharge  Outcome: Progressing  Goal: Verbalize thoughts and feelings  Description: Interventions:  - Assess and re-assess patient's lethality and potential for self-injury  - Engage patient in 1:1 interactions, daily, for a minimum of 15 minutes  - Encourage patient to express feelings, fears, frustrations, hopes  - Establish rapport/trust with patient   Outcome: Progressing  Goal: Refrain from harming self  Description: Interventions:  - Monitor patient closely, per order  - Develop a trusting relationship  - Supervise medication ingestion, monitor effects and side effects   Outcome: Progressing  Goal: Recognize maladaptive responses and adopt new coping mechanisms  Outcome: Progressing     Problem: Depression  Goal: Treatment Goal: Demonstrate behavioral control of depressive symptoms, verbalize feelings of improved mood/affect, and adopt new coping skills prior to discharge  Outcome: Progressing  Goal: Verbalize thoughts and feelings  Description: Interventions:  - Assess and re-assess patient's level of risk   - Engage patient in 1:1 interactions, daily, for a minimum of 15 minutes   - Encourage patient to express feelings, fears, frustrations, hopes   Outcome: Progressing  Goal: Refrain from harming self  Description: Interventions:  - Monitor patient closely, per order   - Supervise medication ingestion, monitor effects and side effects   Outcome: Progressing  Goal: Refrain from isolation  Description: Interventions:  - Develop a trusting relationship   - Encourage socialization   Outcome: Progressing  Goal: Refrain from self-neglect  Outcome: Progressing     Problem: Anxiety  Goal: Anxiety is at manageable level  Description: Interventions:  - Assess and monitor patient's anxiety level  - Monitor for signs and symptoms (heart palpitations, chest pain, shortness of breath, headaches, nausea, feeling jumpy, restlessness, irritable, apprehensive)  - Collaborate with interdisciplinary team and initiate plan and interventions as ordered    - [place] patient to unit/surroundings  - Explain treatment plan  - Encourage participation in care  - Encourage verbalization of concerns/fears  - Identify coping mechanisms  - Assist in developing anxiety-reducing skills  - Administer/offer alternative therapies  - Limit or eliminate stimulants  Outcome: Progressing

## 2022-10-21 NOTE — CASE MANAGEMENT
[phone] for [facility]  They advised that I would need to send in the psych eval, nursing notes and consults to them since he is inpatient  Went and had Hiral Dooley sign an LUKAS form for them and faxed the required documents to the [facility]

## 2022-10-21 NOTE — NURSING NOTE
Pt resting in room reading and intermittently ambulates unit  Pt pleasant on approach  Currently denies s/s reporting "nah, I'm pretty good"  Pt requested PRN tylenol 975 mg at 1910 for bilateral arm pain  Pt refused HS medication reporting "the geodon helps with my nightmares  I don't need it, thank you"  Pt requested tylenol for 7/10 arm pain at 0511

## 2022-10-21 NOTE — PROGRESS NOTES
10/21/22 1402   Team Meeting   Initial Conference Date 10/21/22   Next Conference Date 10/24/22   Team Members Present   Team Members Present Physician;Nurse;;   Patient/Family Present   Patient Present No   Patient's Family Present No     YOON Justin s/s, refused minipress night, mouth checks cooperative with geodon

## 2022-10-22 PROCEDURE — 99232 SBSQ HOSP IP/OBS MODERATE 35: CPT | Performed by: STUDENT IN AN ORGANIZED HEALTH CARE EDUCATION/TRAINING PROGRAM

## 2022-10-22 RX ADMIN — ZIPRASIDONE HYDROCHLORIDE 20 MG: 20 CAPSULE ORAL at 17:42

## 2022-10-22 RX ADMIN — PSYLLIUM HUSK 1 PACKET: 3.4 POWDER ORAL at 08:39

## 2022-10-22 RX ADMIN — NICOTINE 14 MG: 14 PATCH, EXTENDED RELEASE TRANSDERMAL at 08:39

## 2022-10-22 RX ADMIN — IBUPROFEN 400 MG: 400 TABLET ORAL at 05:08

## 2022-10-22 RX ADMIN — ZIPRASIDONE HYDROCHLORIDE 20 MG: 20 CAPSULE ORAL at 08:38

## 2022-10-22 RX ADMIN — HYDROXYZINE HYDROCHLORIDE 100 MG: 50 TABLET, FILM COATED ORAL at 19:29

## 2022-10-22 NOTE — NURSING NOTE
Pt requesting this writer to look up if there was a nutrition consult in his chart  Pt was told that their was not a consult in for nutrition  When patient was questioned about what the purpose of the nutrition consult was patient said that it was because he was poisoned before  When patient was asked to elaborate patient stated "I don't want to go into details for personal safety"  Pt consoled  Pt still requesting to have a nutritionist consult to be placed

## 2022-10-22 NOTE — NURSING NOTE
Patient was visible in the milieu reading a book  VSS  Denies all psych s/s but c/o B/L wrist pain 7/10, motrin 400 mg given at [address] and it was moderately effective  No behaviors noted  Patient requested melatonin to aid with sleep but was discontinued because he told the provider he does not need it earlier in the day, atarax 50 mg given at 2216  Compliant with PM medication  Safety checks ongoing

## 2022-10-22 NOTE — NURSING NOTE
Pt requested Atarax PRN for severe anxiety  Reported 3/4 anxiety  Atarax 100mg given Prn at this moment  Will frequently monitor for effectiveness

## 2022-10-22 NOTE — PLAN OF CARE
Problem: INFECTION - ADULT  Goal: Absence or prevention of progression during hospitalization  Description: INTERVENTIONS:  - Assess and monitor for signs and symptoms of infection  - Monitor lab/diagnostic results  - Monitor all insertion sites, i e  indwelling lines, tubes, and drains  - Monitor endotracheal if appropriate and nasal secretions for changes in amount and color  - [place] appropriate cooling/warming therapies per order  - Administer medications as ordered  - Instruct and encourage patient and family to use good hand hygiene technique  - Identify and instruct in appropriate isolation precautions for identified infection/condition  Outcome: Progressing  Goal: Absence of fever/infection during neutropenic period  Description: INTERVENTIONS:  - Monitor WBC    Outcome: Progressing     Problem: Ineffective Coping  Goal: Cooperates with admission process  Description: Interventions:   - Complete admission process  Outcome: Progressing  Goal: Identifies ineffective coping skills  Outcome: Progressing  Goal: Identifies healthy coping skills  Outcome: Progressing  Goal: Demonstrates healthy coping skills  Outcome: Progressing  Goal: Participates in unit activities  Description: Interventions:  - Provide therapeutic environment   - Provide required programming   - Redirect inappropriate behaviors   Outcome: Progressing  Goal: Patient/Family participate in treatment and DC plans  Description: Interventions:  - Provide therapeutic environment  Outcome: Progressing  Goal: Patient/Family verbalizes awareness of resources  Outcome: Progressing  Goal: Understands least restrictive measures  Description: Interventions:  - Utilize least restrictive behavior  Outcome: Progressing  Goal: Free from restraint events  Description: - Utilize least restrictive measures   - Provide behavioral interventions   - Redirect inappropriate behaviors   Outcome: Progressing     Problem: Risk for Self Injury/Neglect  Goal: Treatment Goal: Remain safe during length of stay, learn and adopt new coping skills, and be free of self-injurious ideation, impulses and acts at the time of discharge  Outcome: Progressing  Goal: Verbalize thoughts and feelings  Description: Interventions:  - Assess and re-assess patient's lethality and potential for self-injury  - Engage patient in 1:1 interactions, daily, for a minimum of 15 minutes  - Encourage patient to express feelings, fears, frustrations, hopes  - Establish rapport/trust with patient   Outcome: Progressing  Goal: Refrain from harming self  Description: Interventions:  - Monitor patient closely, per order  - Develop a trusting relationship  - Supervise medication ingestion, monitor effects and side effects   Outcome: Progressing  Goal: Attend and participate in unit activities, including therapeutic, recreational, and educational groups  Description: Interventions:  - Provide therapeutic and educational activities daily, encourage attendance and participation, and document same in the medical record  - Obtain collateral information, encourage visitation and family involvement in care   Outcome: Progressing  Goal: Recognize maladaptive responses and adopt new coping mechanisms  Outcome: Progressing  Goal: Complete daily ADLs, including personal hygiene independently, as able  Description: Interventions:  - Observe, teach, and assist patient with ADLS  - Monitor and promote a balance of rest/activity, with adequate nutrition and elimination  Outcome: Progressing     Problem: Depression  Goal: Treatment Goal: Demonstrate behavioral control of depressive symptoms, verbalize feelings of improved mood/affect, and adopt new coping skills prior to discharge  Outcome: Progressing  Goal: Verbalize thoughts and feelings  Description: Interventions:  - Assess and re-assess patient's level of risk   - Engage patient in 1:1 interactions, daily, for a minimum of 15 minutes   - Encourage patient to express feelings, fears, frustrations, hopes   Outcome: Progressing  Goal: Refrain from harming self  Description: Interventions:  - Monitor patient closely, per order   - Supervise medication ingestion, monitor effects and side effects   Outcome: Progressing  Goal: Refrain from isolation  Description: Interventions:  - Develop a trusting relationship   - Encourage socialization   Outcome: Progressing  Goal: Refrain from self-neglect  Outcome: Progressing  Goal: Attend and participate in unit activities, including therapeutic, recreational, and educational groups  Description: Interventions:  - Provide therapeutic and educational activities daily, encourage attendance and participation, and document same in the medical record   Outcome: Progressing  Goal: Complete daily ADLs, including personal hygiene independently, as able  Description: Interventions:  - Observe, teach, and assist patient with ADLS  -  Monitor and promote a balance of rest/activity, with adequate nutrition and elimination   Outcome: Progressing     Problem: Anxiety  Goal: Anxiety is at manageable level  Description: Interventions:  - Assess and monitor patient's anxiety level  - Monitor for signs and symptoms (heart palpitations, chest pain, shortness of breath, headaches, nausea, feeling jumpy, restlessness, irritable, apprehensive)  - Collaborate with interdisciplinary team and initiate plan and interventions as ordered    - [place] patient to unit/surroundings  - Explain treatment plan  - Encourage participation in care  - Encourage verbalization of concerns/fears  - Identify coping mechanisms  - Assist in developing anxiety-reducing skills  - Administer/offer alternative therapies  - Limit or eliminate stimulants  Outcome: Progressing

## 2022-10-22 NOTE — NURSING NOTE
Pt is calm and cooperative  Pt is med and meal compliant  Pt reports 2/4 anxiety and denies depression, SI, HI, AH and Vh  Pt is visible and converses with peers at times  Pts bilateral wrist wounds were redressed after pt shower  Pt shows no signs of distress  Will continue to monitor pt frequently

## 2022-10-22 NOTE — PROGRESS NOTES
Progress Note - Kaykay Austin Jimena Reid 33 y o  male MRN: [id number]  Unit/Bed#: [facility] 473-28 Encounter: [id number]    The patient was seen for continuing care and reviewed with treatment team  Patient has been visible on unit, bizarre at times but calm and cooperative   He reports geodon is working well for him, denies any side effects, and is helping to address his depression, thoughts , anxiety  There are no overt symptoms of depression, he reports his mood is improved, he has been eating well and sleeping well  He has no acute suicidal or homicidal ideations  He has clean bandages on both wrist, reports some pain on wrist risk wound today but responsive to Prn pain medication  Woundcare is on board  /67 (BP Location: Right arm)   Pulse 73   Temp 98 °F (36 7 °C) (Temporal)   Resp 18   Ht 6' 3" (1 905 m)   Wt 88 8 kg (195 lb 12 3 oz)   SpO2 99%   BMI 24 47 kg/m²     Current Mental Status Evaluation:  Appearance:  disheveled but clean, [place], long hair   Behavior:  calm, cooperative and friendly   Mood:  "ok"   Affect: Bright, elated at times   Speech: Normal volume and Normal rate   Thought Process:  Goal directed and coherent   Thought Content:  Paranoid and mistrustful   Perceptual Disturbances: Denies hallucinations and does not appear to be responding to internal stimuli   Risk Potential: No suicidal or homicidal ideation   Orientation:   [patient is alert,awake and oriented x 3   Patient continues to have limited insight, judgment and impulse control but Improved  Progress Toward Goals: No significant events in the past 24 hours, Geodon 20mg BID, resistant to medication   adjustments    Continue wrist wound care    Principal Problem:    Paranoia (psychosis) ([facility] [address] )  Active Problems:    Wrist laceration    PTSD (post-traumatic stress disorder)    COVID    Medical clearance for psychiatric admission    Major depression with psychotic features ([facility])    Urinary hesitancy    Discharge planning update: The patient will return to previous living arrangement- patient will be transferred to young adult unit for groups, milieu, coping skills after he completes COVID quarantine  Recommended Treatment: Continue with pharmacotherapy, group therapy, milieu therapy and occupational therapy    The patient will be maintained on the following medications:  Current Facility-Administered Medications   Medication Dose Route Frequency Provider Last Rate   • aluminum-magnesium hydroxide-simethicone  30 mL Oral Q4H PRN Gurinder Vegas MD     • artificial tear  1 application Both Eyes Y1Z PRN Gurinder Vegas MD     • haloperidol lactate  2 5 mg Intramuscular Q6H PRN Max 4/day Gurinder Vegas MD      And   • LORazepam  1 mg Intramuscular Q6H PRN Max 4/day Gurinder Vegas MD      And   • benztropine  0 5 mg Intramuscular Q6H PRN Max 4/day Gurinder Vegas MD     • haloperidol lactate  5 mg Intramuscular Q4H PRN Max 4/day Gurinder Vegas MD      And   • LORazepam  2 mg Intramuscular Q4H PRN Max 4/day Gurinder Vegas MD      And   • benztropine  1 mg Intramuscular Q4H PRN Max 4/day Gurinder Vegas MD     • benztropine  1 mg Intramuscular Q4H PRN Max 6/day Gurinder Vegas MD     • benztropine  1 mg Oral Q4H PRN Max 6/day Gurinder Vegas MD     • hydrOXYzine HCL  50 mg Oral Q6H PRN Max 4/day Gurinder Vegas MD      Or   • diphenhydrAMINE  50 mg Intramuscular Q6H PRN Gurinder Vegas MD     • haloperidol  2 mg Oral Q4H PRN Max 6/day Gurinder Vegas MD     • haloperidol  5 mg Oral Q6H PRN Max 4/day Gurinder Vegas MD     • haloperidol  5 mg Oral Q4H PRN Max 4/day Gurinder Vegas MD     • hydrOXYzine HCL  100 mg Oral Q6H PRN Max 4/day Gurinder Vegas MD      Or   • LORazepam  2 mg Intramuscular Q6H PRN Gurinder Vegas MD     • hydrOXYzine HCL  25 mg Oral Q6H PRN Max 4/day Gurinder Vegas MD     • ibuprofen  200 mg Oral Q6H PRN Jacqueline Ha MD     • ibuprofen  400 mg Oral Q6H PRN Jacqueline Ha MD     • nicotine  14 mg Transdermal Daily Mikel Schuster DO     • polyethylene glycol  17 g Oral Daily PRN Gurinder Vegas MD     • propranolol  10 mg Oral Q8H PRN Gurinder Vegas MD     • psyllium  1 packet Oral Daily Mikel Schuster DO     • senna-docusate sodium  1 tablet Oral Daily PRN Gurinder Vegas MD     • ziprasidone  20 mg Oral BID With Meals Adamaris Zacarias MD

## 2022-10-23 PROCEDURE — 99232 SBSQ HOSP IP/OBS MODERATE 35: CPT | Performed by: STUDENT IN AN ORGANIZED HEALTH CARE EDUCATION/TRAINING PROGRAM

## 2022-10-23 RX ADMIN — IBUPROFEN 400 MG: 400 TABLET ORAL at 04:22

## 2022-10-23 RX ADMIN — ZIPRASIDONE HYDROCHLORIDE 20 MG: 20 CAPSULE ORAL at 08:50

## 2022-10-23 RX ADMIN — IBUPROFEN 400 MG: 400 TABLET ORAL at 13:26

## 2022-10-23 RX ADMIN — PSYLLIUM HUSK 1 PACKET: 3.4 POWDER ORAL at 08:48

## 2022-10-23 RX ADMIN — HYDROXYZINE HYDROCHLORIDE 50 MG: 50 TABLET, FILM COATED ORAL at 13:26

## 2022-10-23 RX ADMIN — NICOTINE 14 MG: 14 PATCH, EXTENDED RELEASE TRANSDERMAL at 08:51

## 2022-10-23 RX ADMIN — ZIPRASIDONE HYDROCHLORIDE 20 MG: 20 CAPSULE ORAL at 17:37

## 2022-10-23 RX ADMIN — HYDROXYZINE HYDROCHLORIDE 50 MG: 50 TABLET, FILM COATED ORAL at 19:34

## 2022-10-23 NOTE — PLAN OF CARE
Problem: INFECTION - ADULT  Goal: Absence or prevention of progression during hospitalization  Description: INTERVENTIONS:  - Assess and monitor for signs and symptoms of infection  - Monitor lab/diagnostic results  - Monitor all insertion sites, i e  indwelling lines, tubes, and drains  - Monitor endotracheal if appropriate and nasal secretions for changes in amount and color  - [place] appropriate cooling/warming therapies per order  - Administer medications as ordered  - Instruct and encourage patient and family to use good hand hygiene technique  - Identify and instruct in appropriate isolation precautions for identified infection/condition  Outcome: Progressing  Goal: Absence of fever/infection during neutropenic period  Description: INTERVENTIONS:  - Monitor WBC    Outcome: Progressing     Problem: Ineffective Coping  Goal: Cooperates with admission process  Description: Interventions:   - Complete admission process  Outcome: Progressing  Goal: Identifies ineffective coping skills  Outcome: Progressing  Goal: Identifies healthy coping skills  Outcome: Progressing  Goal: Demonstrates healthy coping skills  Outcome: Progressing  Goal: Participates in unit activities  Description: Interventions:  - Provide therapeutic environment   - Provide required programming   - Redirect inappropriate behaviors   Outcome: Progressing  Goal: Patient/Family participate in treatment and DC plans  Description: Interventions:  - Provide therapeutic environment  Outcome: Progressing  Goal: Patient/Family verbalizes awareness of resources  Outcome: Progressing  Goal: Understands least restrictive measures  Description: Interventions:  - Utilize least restrictive behavior  Outcome: Progressing  Goal: Free from restraint events  Description: - Utilize least restrictive measures   - Provide behavioral interventions   - Redirect inappropriate behaviors   Outcome: Progressing     Problem: Risk for Self Injury/Neglect  Goal: Treatment Goal: Remain safe during length of stay, learn and adopt new coping skills, and be free of self-injurious ideation, impulses and acts at the time of discharge  Outcome: Progressing  Goal: Verbalize thoughts and feelings  Description: Interventions:  - Assess and re-assess patient's lethality and potential for self-injury  - Engage patient in 1:1 interactions, daily, for a minimum of 15 minutes  - Encourage patient to express feelings, fears, frustrations, hopes  - Establish rapport/trust with patient   Outcome: Progressing  Goal: Refrain from harming self  Description: Interventions:  - Monitor patient closely, per order  - Develop a trusting relationship  - Supervise medication ingestion, monitor effects and side effects   Outcome: Progressing  Goal: Attend and participate in unit activities, including therapeutic, recreational, and educational groups  Description: Interventions:  - Provide therapeutic and educational activities daily, encourage attendance and participation, and document same in the medical record  - Obtain collateral information, encourage visitation and family involvement in care   Outcome: Progressing  Goal: Recognize maladaptive responses and adopt new coping mechanisms  Outcome: Progressing  Goal: Complete daily ADLs, including personal hygiene independently, as able  Description: Interventions:  - Observe, teach, and assist patient with ADLS  - Monitor and promote a balance of rest/activity, with adequate nutrition and elimination  Outcome: Progressing     Problem: Depression  Goal: Treatment Goal: Demonstrate behavioral control of depressive symptoms, verbalize feelings of improved mood/affect, and adopt new coping skills prior to discharge  Outcome: Progressing  Goal: Verbalize thoughts and feelings  Description: Interventions:  - Assess and re-assess patient's level of risk   - Engage patient in 1:1 interactions, daily, for a minimum of 15 minutes   - Encourage patient to express feelings, fears, frustrations, hopes   Outcome: Progressing  Goal: Refrain from harming self  Description: Interventions:  - Monitor patient closely, per order   - Supervise medication ingestion, monitor effects and side effects   Outcome: Progressing  Goal: Refrain from isolation  Description: Interventions:  - Develop a trusting relationship   - Encourage socialization   Outcome: Progressing  Goal: Refrain from self-neglect  Outcome: Progressing  Goal: Attend and participate in unit activities, including therapeutic, recreational, and educational groups  Description: Interventions:  - Provide therapeutic and educational activities daily, encourage attendance and participation, and document same in the medical record   Outcome: Progressing  Goal: Complete daily ADLs, including personal hygiene independently, as able  Description: Interventions:  - Observe, teach, and assist patient with ADLS  -  Monitor and promote a balance of rest/activity, with adequate nutrition and elimination   Outcome: Progressing     Problem: Anxiety  Goal: Anxiety is at manageable level  Description: Interventions:  - Assess and monitor patient's anxiety level  - Monitor for signs and symptoms (heart palpitations, chest pain, shortness of breath, headaches, nausea, feeling jumpy, restlessness, irritable, apprehensive)  - Collaborate with interdisciplinary team and initiate plan and interventions as ordered    - [place] patient to unit/surroundings  - Explain treatment plan  - Encourage participation in care  - Encourage verbalization of concerns/fears  - Identify coping mechanisms  - Assist in developing anxiety-reducing skills  - Administer/offer alternative therapies  - Limit or eliminate stimulants  Outcome: Progressing

## 2022-10-23 NOTE — PROGRESS NOTES
Progress Note - Guillermoreinaldo Norman Reid 33 y o  male MRN: [id number]  Unit/Bed#: [facility] 367-05 Encounter: [id number]    The patient was seen for continuing care and reviewed with treatment team  Seen in milieu, interacting  with peers  Reports he enjoys being on the unit  He is making good use of the peace and quiet  He has been reading books and reports he enjoys them  He is pleasant but guarded at times  He continues to ask about seeing a nutritionist, talks about being poisoned but refuses to elaborate" I don't want to talk about it"  He reports poor sleep last night, took an atarax pRN but today worries that this cannot be combined with Geodon  He was was reassured and expressed understanding  No EPS, wants to stay on same dose of geodon  No AVH  He denies symptoms of depression; no RE  /72 (BP Location: Right arm)   Pulse 83   Temp 97 8 °F (36 6 °C) (Temporal)   Resp 19   Ht 6' 3" (1 905 m)   Wt 88 8 kg (195 lb 12 3 oz)   SpO2 98%   BMI 24 47 kg/m²     Current Mental Status Evaluation:  Appearance:  Good eye contact, [place], long hair, fair hygiene   Behavior:  calm, evasive and guarded   Mood:  "ok"   Affect: Bright, elated at times   Speech: Normal volume and Normal rate   Thought Process:  Goal directed and coherent   Thought Content:  Paranoid and mistrustful believes he was being poisoned , continues to ask for nutrition consult  Perceptual Disturbances: Denies hallucinations and does not appear to be responding to internal stimuli   Risk Potential: No suicidal or homicidal ideation   Orientation:   [patient is alert,awake and oriented x 3   Patient continues to have limited insight, judgment and impulse control but Improved  Progress Toward Goals: No significant events in the past 24 hours, Geodon 20mg BID, resistant to medication adjustments        Principal Problem:    Paranoia (psychosis) ([address] )  Active Problems:    Wrist laceration    PTSD (post-traumatic stress disorder)    COVID    Medical clearance for psychiatric admission    Major depression with psychotic features [facility])    Urinary hesitancy    Discharge planning update: The patient will return to previous living arrangement- patient will be transferred to young adult unit for groups, milieu, coping skills after he completes COVID quarantine  Continue bilateral wrist wound care    Recommended Treatment: Continue with pharmacotherapy, group therapy, milieu therapy and occupational therapy    The patient will be maintained on the following medications:  Current Facility-Administered Medications   Medication Dose Route Frequency Provider Last Rate   • aluminum-magnesium hydroxide-simethicone  30 mL Oral Q4H PRN Forestine Ends, MD     • artificial tear  1 application Both Eyes G9T PRN Forestine Ends, MD     • haloperidol lactate  2 5 mg Intramuscular Q6H PRN Max 4/day Forestine Ends, MD      And   • LORazepam  1 mg Intramuscular Q6H PRN Max 4/day Forestine Ends, MD      And   • benztropine  0 5 mg Intramuscular Q6H PRN Max 4/day Forestine Ends, MD     • haloperidol lactate  5 mg Intramuscular Q4H PRN Max 4/day Forestine MD Claudy      And   • LORazepam  2 mg Intramuscular Q4H PRN Max 4/day Forestine Ends, MD      And   • benztropine  1 mg Intramuscular Q4H PRN Max 4/day Forestine MD Claudy     • benztropine  1 mg Intramuscular Q4H PRN Max 6/day Forestine Ends, MD     • benztropine  1 mg Oral Q4H PRN Max 6/day Forestine Ends, MD     • hydrOXYzine HCL  50 mg Oral Q6H PRN Max 4/day Forestine MD Claudy      Or   • diphenhydrAMINE  50 mg Intramuscular Q6H PRN Forestine MD Claudy     • haloperidol  2 mg Oral Q4H PRN Max 6/day Forestine Ends, MD     • haloperidol  5 mg Oral Q6H PRN Max 4/day Forestine Ends, MD     • haloperidol  5 mg Oral Q4H PRN Max 4/day Forestine Ends, MD     • hydrOXYzine HCL  100 mg Oral Q6H PRN Max 4/day [facility] Wyatt Rodriguez MD      Or   • LORazepam  2 mg Intramuscular Q6H PRN Janette Strickland MD     • hydrOXYzine HCL  25 mg Oral Q6H PRN Max 4/day Janette Strickland MD     • ibuprofen  200 mg Oral Q6H PRN Yaz Boone MD     • ibuprofen  400 mg Oral Q6H PRN Yaz Boone MD     • nicotine  14 mg Transdermal Daily Jeraline Flattery, DO     • polyethylene glycol  17 g Oral Daily PRN Janette Strickland MD     • propranolol  10 mg Oral Q8H PRN Janette Strickland MD     • psyllium  1 packet Oral Daily Jeraline Flattery, DO     • senna-docusate sodium  1 tablet Oral Daily PRN Janette Strickland MD     • ziprasidone  20 mg Oral BID With Meals Adamaris Solorio MD

## 2022-10-23 NOTE — NURSING NOTE
Pt is calm and cooperative  Pt is med and meal compliant  Pt is visible in the milieu and walks halls at times  Pt reports 1/4 anxiety and denies depression, SI, HI, AH and VH  Pt given atarax and ibuprofen for moderate anxiety and moderate pain  Medications were effective  Pt will be monitored frequently

## 2022-10-24 PROCEDURE — 99232 SBSQ HOSP IP/OBS MODERATE 35: CPT

## 2022-10-24 RX ADMIN — IBUPROFEN 200 MG: 200 TABLET, FILM COATED ORAL at 20:09

## 2022-10-24 RX ADMIN — ZIPRASIDONE HYDROCHLORIDE 20 MG: 20 CAPSULE ORAL at 17:12

## 2022-10-24 RX ADMIN — NICOTINE 14 MG: 14 PATCH, EXTENDED RELEASE TRANSDERMAL at 08:55

## 2022-10-24 RX ADMIN — IBUPROFEN 400 MG: 400 TABLET ORAL at 03:40

## 2022-10-24 RX ADMIN — HYDROXYZINE HYDROCHLORIDE 50 MG: 50 TABLET, FILM COATED ORAL at 12:04

## 2022-10-24 RX ADMIN — ZIPRASIDONE HYDROCHLORIDE 20 MG: 20 CAPSULE ORAL at 08:47

## 2022-10-24 RX ADMIN — PSYLLIUM HUSK 1 PACKET: 3.4 POWDER ORAL at 08:47

## 2022-10-24 RX ADMIN — HYDROXYZINE HYDROCHLORIDE 100 MG: 50 TABLET, FILM COATED ORAL at 17:27

## 2022-10-24 NOTE — PROGRESS NOTES
Progress Note - Kaykay Austin Jimena Reid 33 y o  male MRN: [id number]  Unit/Bed#: Renata Rayo 625-78 Encounter: [id number]    Patient was seen today for continuation of care, records reviewed and patient was discussed with the morning case review team     Mahad Hilton was seen today for psychiatric follow-up  On assessment today, Mahad Hilton was found in his room  He is seen ripping small pieces of newspaper up, reports being anxious but declines need for PRN  He is evasive, superficial, and intentionally withholds information  Does appear paranoid and suspicious  When asked what is making him anxious, he reports with "I don't trust you so why should I tell you"  He believes he is stable for discharge, however when this writer reports that he needs to cooperate with the psychiatric assessment in order to be discharged, he states "My thoughts are none of your business"  Did receive PRN Atarax yesterday and today, reports disrupted sleep and adequate oral intake  He is paranoid regarding blood work and believes we intentionally stole his blood  This writer attempted to review with him his blood work from 10/18 and Mahad Hilton showed no interest in this education  He was reminded that he will be transferred to the third floor once his quarantine is complete and states "I don't need to go downstairs, I'm not suicidal and I'm not crazy"  Discussed using the time downstairs to further promote his mental health recovery and further psychiatric stabilization, he does not feel like it's necessary  Is minimizing his self-harm  Mahad Hilton denies acute suicidal/self-harm ideation/intent/plan upon direct inquiry at this time  Mahad Hilton remains behaviorally appropriate, no agitation or aggression noted on exam or in report  Mahad Hilton also denies HI/AH/VH, and does not appear overtly manic  Paranoid delusions still present  Impulse control is limited    Mahad Hilton remains adherent to his current psychotropic medication regimen and denies any side effects from medications, as well as none noted on exam     Christie Shanks will be up tomorrow, plan to transfer to the 3rd floor for ongoing psychiatric stabilization, milieu/group therapy, and continue working on a safe discharge plan  Vitals:  Vitals:    10/24/22 0700   BP: 134/89   Pulse: 63   Resp: 18   Temp: 97 5 °F (36 4 °C)   SpO2: 99%       Laboratory Results:    I have personally reviewed all pertinent laboratory/tests results  Most Recent Labs:   Lab Results   Component Value Date    WBC 5 73 10/17/2022    RBC 4 04 10/17/2022    HGB 12 6 10/17/2022    HCT 38 4 10/17/2022     10/17/2022    RDW 12 2 10/17/2022    NEUTROABS 2 89 10/17/2022    SODIUM 137 10/18/2022    K 4 0 10/18/2022     10/18/2022    CO2 29 10/18/2022    BUN 7 10/18/2022    CREATININE 0 70 10/18/2022    GLUC 100 (H) 10/18/2022    CALCIUM 9 1 10/18/2022    AST 37 10/18/2022    ALT 44 10/18/2022    ALKPHOS 44 10/18/2022    TP 7 2 10/18/2022    ALB 4 4 10/18/2022    TBILI 0 77 10/18/2022    CHOLESTEROL 161 10/15/2022    HDL 34 (L) 10/15/2022    TRIG 104 10/15/2022    LDLCALC 106 10/15/2022    [place] 127 10/15/2022    COC1CLWKLAJG 3 290 10/15/2022    RPR Non-Reactive 10/15/2022       Psychiatric Review of Systems:  Behavior over the last 24 hours:  unchanged     Sleep:  Disrupted sleep  Appetite: adequate  Medication side effects: none reported  ROS: no complaints, denies shortness of breath or chest pain and all other systems are negative for acute changes    Mental Status Evaluation:  Appearance:  disheveled, looks stated age, bearded   Behavior:  guarded, poor eye contact, evasive, superficial   Speech:  normal rate, normal volume, normal pitch   Mood:  anxious   Affect:  constricted   Thought Process:  perseverative   Thought Content:  paranoid delusions, suspicious   Perceptual Disturbances: no auditory hallucinations, no visual hallucinations, denies when asked, does not appear responding to internal stimuli Risk Potential: Suicidal ideation - None at present, contracts for safety on the unit, would talk to staff if not feeling safe on the unit  Homicidal ideation - None at present  Potential for aggression - Not at present   Memory:  recent memory intact   Sensorium  person, place, time/date and situation      Consciousness:  alert and awake   Attention: attention span and concentration appear shorter than expected for age   Insight:  limited   Judgment: limited   Gait/Station: normal gait/station, normal balance   Motor Activity: no abnormal movements   Progress Toward Goals:   Judit Lynch is progressing towards goals of inpatient psychiatric treatment by continued medication compliance and is attending therapeutic modalities on the milieu  However, the patient continues to require inpatient psychiatric hospitalization for continued medication management and titration to optimize symptom reduction, improve sleep hygiene, and demonstrate adequate self-care  Assessment/Plan   Principal Problem:    Paranoia (psychosis) ([facility] [address] )  Active Problems:    Wrist laceration    PTSD (post-traumatic stress disorder)    COVID    Medical clearance for psychiatric admission    Major depression with psychotic features [facility])    Urinary hesitancy      Recommended Treatment: Treatment plan and medication changes discussed and per the attending physician the plan is: 1  Continue with group therapy, milieu therapy and occupational therapy  2  Behavioral Health checks every 7 minutes  3  Continue frequent safety checks and vitals per unit protocol  4  Continue with SLIM medical management as indicated  5  Continue with current medication regimen  6  Will review labs in the a m  7 Disposition Planning: Discharge planning and efforts remain ongoing    Behavioral Health Medications: all current active meds have been reviewed and continue current psychiatric medications    Current Facility-Administered Medications   Medication Dose Route Frequency Provider Last Rate   • aluminum-magnesium hydroxide-simethicone  30 mL Oral Q4H PRN Geralene Pod, MD     • artificial tear  1 application Both Eyes L5Q PRN Geralene Pod, MD     • haloperidol lactate  2 5 mg Intramuscular Q6H PRN Max 4/day Geralene Pod, MD      And   • LORazepam  1 mg Intramuscular Q6H PRN Max 4/day Geralene Pod, MD      And   • benztropine  0 5 mg Intramuscular Q6H PRN Max 4/day Geralene Pod, MD     • haloperidol lactate  5 mg Intramuscular Q4H PRN Max 4/day Geralene Pod, MD      And   • LORazepam  2 mg Intramuscular Q4H PRN Max 4/day Geralene Pod, MD      And   • benztropine  1 mg Intramuscular Q4H PRN Max 4/day Geralene Pod, MD     • benztropine  1 mg Intramuscular Q4H PRN Max 6/day Geralene Pod, MD     • benztropine  1 mg Oral Q4H PRN Max 6/day Geralene Pod, MD     • hydrOXYzine HCL  50 mg Oral Q6H PRN Max 4/day Geralene Deidre, MD      Or   • diphenhydrAMINE  50 mg Intramuscular Q6H PRN Geralene Pod, MD     • haloperidol  2 mg Oral Q4H PRN Max 6/day Geralene Pod, MD     • haloperidol  5 mg Oral Q6H PRN Max 4/day Geralene Deidre, MD     • haloperidol  5 mg Oral Q4H PRN Max 4/day Geralene Pod, MD     • hydrOXYzine HCL  100 mg Oral Q6H PRN Max 4/day Geralerico Jiang MD      Or   • LORazepam  2 mg Intramuscular Q6H PRN Geralene Deidre, MD     • hydrOXYzine HCL  25 mg Oral Q6H PRN Max 4/day Geralene Pod, MD     • ibuprofen  200 mg Oral Q6H PRN Parul Goodwin MD     • ibuprofen  400 mg Oral Q6H PRN Parul Goodwin MD     • nicotine  14 mg Transdermal Daily Misha Aceves DO     • polyethylene glycol  17 g Oral Daily PRN Louise Jiang MD     • propranolol  10 mg Oral Q8H PRN Geralerico Jiang, MD     • psyllium  1 packet Oral Daily Misha Aceves DO     • senna-docusate sodium  1 tablet Oral Daily PRN Geralerico Jiang MD     • ziprasidone  20 mg Oral BID With Meals Thom Mclean MD         Risks / Benefits of Treatment:  Risks, benefits, and possible side effects of medications explained to patient  Patient has limited understanding of risks and benefits of treatment at this time, but agrees to take medications as prescribed  Counseling / Coordination of Care:  Patient's progress reviewed with nursing staff  Medications, treatment progress and treatment plan reviewed with patient  Supportive counseling provided to the patient  Total floor/unit time spent today 25 minutes  Greater than 50% of total time was spent with the patient and / or family counseling and / or coordination of care  A description of the counseling / coordination of care: medication education, treatment plan, supportive therapy

## 2022-10-24 NOTE — CASE MANAGEMENT
Angela Anthony is expected to be transferred down to the 3rd floor tomorrow as the COVID restrictions will be up  Patient has a referral in with the family [facility] in [address] that was sent last week for OP therapy, psychiatry, and suicide prevention

## 2022-10-24 NOTE — NURSING NOTE
Patient was visible in the milieu doing coloring activity  VSS  Rate anxiety 2/4, denies all other psych s/s but c/o B/L wrist pain 3/10, declined pain medication at the moment  No behaviors noted  Atarax 50 mg given at 1934 for anxiety per patient request and it was effective  Safety checks ongoing

## 2022-10-24 NOTE — PLAN OF CARE
Problem: INFECTION - ADULT  Goal: Absence or prevention of progression during hospitalization  Description: INTERVENTIONS:  - Assess and monitor for signs and symptoms of infection  - Monitor lab/diagnostic results  - Monitor all insertion sites, i e  indwelling lines, tubes, and drains  - Monitor endotracheal if appropriate and nasal secretions for changes in amount and color  - [place] appropriate cooling/warming therapies per order  - Administer medications as ordered  - Instruct and encourage patient and family to use good hand hygiene technique  - Identify and instruct in appropriate isolation precautions for identified infection/condition  Outcome: Progressing  Goal: Absence of fever/infection during neutropenic period  Description: INTERVENTIONS:  - Monitor WBC    Outcome: Progressing     Problem: Ineffective Coping  Goal: Cooperates with admission process  Description: Interventions:   - Complete admission process  Outcome: Progressing  Goal: Identifies ineffective coping skills  Outcome: Progressing  Goal: Identifies healthy coping skills  Outcome: Progressing  Goal: Demonstrates healthy coping skills  Outcome: Progressing  Goal: Participates in unit activities  Description: Interventions:  - Provide therapeutic environment   - Provide required programming   - Redirect inappropriate behaviors   Outcome: Progressing  Goal: Patient/Family participate in treatment and DC plans  Description: Interventions:  - Provide therapeutic environment  Outcome: Progressing  Goal: Patient/Family verbalizes awareness of resources  Outcome: Progressing  Goal: Understands least restrictive measures  Description: Interventions:  - Utilize least restrictive behavior  Outcome: Progressing  Goal: Free from restraint events  Description: - Utilize least restrictive measures   - Provide behavioral interventions   - Redirect inappropriate behaviors   Outcome: Progressing     Problem: Risk for Self Injury/Neglect  Goal: Treatment Goal: Remain safe during length of stay, learn and adopt new coping skills, and be free of self-injurious ideation, impulses and acts at the time of discharge  Outcome: Progressing  Goal: Verbalize thoughts and feelings  Description: Interventions:  - Assess and re-assess patient's lethality and potential for self-injury  - Engage patient in 1:1 interactions, daily, for a minimum of 15 minutes  - Encourage patient to express feelings, fears, frustrations, hopes  - Establish rapport/trust with patient   Outcome: Progressing  Goal: Refrain from harming self  Description: Interventions:  - Monitor patient closely, per order  - Develop a trusting relationship  - Supervise medication ingestion, monitor effects and side effects   Outcome: Progressing  Goal: Attend and participate in unit activities, including therapeutic, recreational, and educational groups  Description: Interventions:  - Provide therapeutic and educational activities daily, encourage attendance and participation, and document same in the medical record  - Obtain collateral information, encourage visitation and family involvement in care   Outcome: Progressing  Goal: Recognize maladaptive responses and adopt new coping mechanisms  Outcome: Progressing  Goal: Complete daily ADLs, including personal hygiene independently, as able  Description: Interventions:  - Observe, teach, and assist patient with ADLS  - Monitor and promote a balance of rest/activity, with adequate nutrition and elimination  Outcome: Progressing     Problem: Depression  Goal: Treatment Goal: Demonstrate behavioral control of depressive symptoms, verbalize feelings of improved mood/affect, and adopt new coping skills prior to discharge  Outcome: Progressing  Goal: Verbalize thoughts and feelings  Description: Interventions:  - Assess and re-assess patient's level of risk   - Engage patient in 1:1 interactions, daily, for a minimum of 15 minutes   - Encourage patient to express feelings, fears, frustrations, hopes   Outcome: Progressing  Goal: Refrain from harming self  Description: Interventions:  - Monitor patient closely, per order   - Supervise medication ingestion, monitor effects and side effects   Outcome: Progressing  Goal: Refrain from isolation  Description: Interventions:  - Develop a trusting relationship   - Encourage socialization   Outcome: Progressing  Goal: Refrain from self-neglect  Outcome: Progressing  Goal: Attend and participate in unit activities, including therapeutic, recreational, and educational groups  Description: Interventions:  - Provide therapeutic and educational activities daily, encourage attendance and participation, and document same in the medical record   Outcome: Progressing  Goal: Complete daily ADLs, including personal hygiene independently, as able  Description: Interventions:  - Observe, teach, and assist patient with ADLS  -  Monitor and promote a balance of rest/activity, with adequate nutrition and elimination   Outcome: Progressing     Problem: Anxiety  Goal: Anxiety is at manageable level  Description: Interventions:  - Assess and monitor patient's anxiety level  - Monitor for signs and symptoms (heart palpitations, chest pain, shortness of breath, headaches, nausea, feeling jumpy, restlessness, irritable, apprehensive)  - Collaborate with interdisciplinary team and initiate plan and interventions as ordered    - [place] patient to unit/surroundings  - Explain treatment plan  - Encourage participation in care  - Encourage verbalization of concerns/fears  - Identify coping mechanisms  - Assist in developing anxiety-reducing skills  - Administer/offer alternative therapies  - Limit or eliminate stimulants  Outcome: Progressing

## 2022-10-24 NOTE — NURSING NOTE
Pt is calm and cooperative  Pt is med and meal compliant  Pt is visible in the milieu at times  Pt is pleasant on approach  Pt reporting 1/4 anxiety and denies depression  Pt denies SI, HI, AH and VH  Pt will continued to be monitored

## 2022-10-24 NOTE — NURSING NOTE
Pt given atarax at [phone] for increasing moderate anxiety  Medication effectiveness will be monitored

## 2022-10-24 NOTE — PLAN OF CARE
Problem: INFECTION - ADULT  Goal: Absence or prevention of progression during hospitalization  Description: INTERVENTIONS:  - Assess and monitor for signs and symptoms of infection  - Monitor lab/diagnostic results  - Monitor all insertion sites, i e  indwelling lines, tubes, and drains  - Monitor endotracheal if appropriate and nasal secretions for changes in amount and color  - [place] appropriate cooling/warming therapies per order  - Administer medications as ordered  - Instruct and encourage patient and family to use good hand hygiene technique  - Identify and instruct in appropriate isolation precautions for identified infection/condition  Outcome: Progressing  Goal: Absence of fever/infection during neutropenic period  Description: INTERVENTIONS:  - Monitor WBC    Outcome: Progressing     Problem: DISCHARGE PLANNING  Goal: Discharge to home or other facility with appropriate resources  Description: INTERVENTIONS:  - Identify barriers to discharge w/patient and caregiver  - Arrange for needed discharge resources and transportation as appropriate  - Identify discharge learning needs (meds, wound care, etc )  - Arrange for interpretive services to assist at discharge as needed  - Refer to Case Management Department for coordinating discharge planning if the patient needs post-hospital services based on physician/advanced practitioner order or complex needs related to functional status, cognitive ability, or social support system  Outcome: Progressing     Problem: Ineffective Coping  Goal: Cooperates with admission process  Description: Interventions:   - Complete admission process  Outcome: Progressing  Goal: Identifies ineffective coping skills  Outcome: Progressing  Goal: Identifies healthy coping skills  Outcome: Progressing  Goal: Demonstrates healthy coping skills  Outcome: Progressing  Goal: Participates in unit activities  Description: Interventions:  - Provide therapeutic environment   - Provide required programming   - Redirect inappropriate behaviors   Outcome: Progressing  Goal: Patient/Family participate in treatment and DC plans  Description: Interventions:  - Provide therapeutic environment  Outcome: Progressing  Goal: Patient/Family verbalizes awareness of resources  Outcome: Progressing  Goal: Understands least restrictive measures  Description: Interventions:  - Utilize least restrictive behavior  Outcome: Progressing  Goal: Free from restraint events  Description: - Utilize least restrictive measures   - Provide behavioral interventions   - Redirect inappropriate behaviors   Outcome: Progressing     Problem: Risk for Self Injury/Neglect  Goal: Treatment Goal: Remain safe during length of stay, learn and adopt new coping skills, and be free of self-injurious ideation, impulses and acts at the time of discharge  Outcome: Progressing  Goal: Verbalize thoughts and feelings  Description: Interventions:  - Assess and re-assess patient's lethality and potential for self-injury  - Engage patient in 1:1 interactions, daily, for a minimum of 15 minutes  - Encourage patient to express feelings, fears, frustrations, hopes  - Establish rapport/trust with patient   Outcome: Progressing  Goal: Refrain from harming self  Description: Interventions:  - Monitor patient closely, per order  - Develop a trusting relationship  - Supervise medication ingestion, monitor effects and side effects   Outcome: Progressing  Goal: Attend and participate in unit activities, including therapeutic, recreational, and educational groups  Description: Interventions:  - Provide therapeutic and educational activities daily, encourage attendance and participation, and document same in the medical record  - Obtain collateral information, encourage visitation and family involvement in care   Outcome: Progressing  Goal: Recognize maladaptive responses and adopt new coping mechanisms  Outcome: Progressing  Goal: Complete daily ADLs, including personal hygiene independently, as able  Description: Interventions:  - Observe, teach, and assist patient with ADLS  - Monitor and promote a balance of rest/activity, with adequate nutrition and elimination  Outcome: Progressing     Problem: Depression  Goal: Treatment Goal: Demonstrate behavioral control of depressive symptoms, verbalize feelings of improved mood/affect, and adopt new coping skills prior to discharge  Outcome: Progressing  Goal: Verbalize thoughts and feelings  Description: Interventions:  - Assess and re-assess patient's level of risk   - Engage patient in 1:1 interactions, daily, for a minimum of 15 minutes   - Encourage patient to express feelings, fears, frustrations, hopes   Outcome: Progressing  Goal: Refrain from harming self  Description: Interventions:  - Monitor patient closely, per order   - Supervise medication ingestion, monitor effects and side effects   Outcome: Progressing  Goal: Refrain from isolation  Description: Interventions:  - Develop a trusting relationship   - Encourage socialization   Outcome: Progressing  Goal: Refrain from self-neglect  Outcome: Progressing  Goal: Attend and participate in unit activities, including therapeutic, recreational, and educational groups  Description: Interventions:  - Provide therapeutic and educational activities daily, encourage attendance and participation, and document same in the medical record   Outcome: Progressing  Goal: Complete daily ADLs, including personal hygiene independently, as able  Description: Interventions:  - Observe, teach, and assist patient with ADLS  -  Monitor and promote a balance of rest/activity, with adequate nutrition and elimination   Outcome: Progressing     Problem: Anxiety  Goal: Anxiety is at manageable level  Description: Interventions:  - Assess and monitor patient's anxiety level     - Monitor for signs and symptoms (heart palpitations, chest pain, shortness of breath, headaches, nausea, feeling jumpy, restlessness, irritable, apprehensive)  - Collaborate with interdisciplinary team and initiate plan and interventions as ordered    - [place] patient to unit/surroundings  - Explain treatment plan  - Encourage participation in care  - Encourage verbalization of concerns/fears  - Identify coping mechanisms  - Assist in developing anxiety-reducing skills  - Administer/offer alternative therapies  - Limit or eliminate stimulants  Outcome: Progressing

## 2022-10-24 NOTE — PROGRESS NOTES
10/24/22 0913   Team Meeting   Initial Conference Date 10/24/22   Next Conference Date 10/25/22   Team Members Present   Team Members Present Physician;Nurse;;; Occupational Therapist   Patient/Family Present   Patient Present No   Patient's Family Present No     YOON Lees T [place] - 2/4, atarax given, deny 0/10, social with peers, bizarre, guarded, COVID restriction up tomorrow and will be going downstairs to 3

## 2022-10-25 PROCEDURE — 99232 SBSQ HOSP IP/OBS MODERATE 35: CPT

## 2022-10-25 RX ORDER — ZIPRASIDONE HYDROCHLORIDE 40 MG/1
40 CAPSULE ORAL 2 TIMES DAILY WITH MEALS
Status: DISCONTINUED | OUTPATIENT
Start: 2022-10-25 | End: 2022-10-27 | Stop reason: HOSPADM

## 2022-10-25 RX ADMIN — PSYLLIUM HUSK 1 PACKET: 3.4 POWDER ORAL at 08:24

## 2022-10-25 RX ADMIN — ZIPRASIDONE HYDROCHLORIDE 20 MG: 20 CAPSULE ORAL at 08:24

## 2022-10-25 RX ADMIN — ZIPRASIDONE HYDROCHLORIDE 40 MG: 40 CAPSULE ORAL at 17:25

## 2022-10-25 RX ADMIN — HYDROXYZINE HYDROCHLORIDE 100 MG: 50 TABLET, FILM COATED ORAL at 06:54

## 2022-10-25 RX ADMIN — IBUPROFEN 200 MG: 200 TABLET, FILM COATED ORAL at 04:13

## 2022-10-25 RX ADMIN — HYDROXYZINE HYDROCHLORIDE 100 MG: 50 TABLET, FILM COATED ORAL at 11:49

## 2022-10-25 RX ADMIN — NICOTINE 14 MG: 14 PATCH, EXTENDED RELEASE TRANSDERMAL at 08:30

## 2022-10-25 RX ADMIN — HYDROXYZINE HYDROCHLORIDE 100 MG: 50 TABLET, FILM COATED ORAL at 17:50

## 2022-10-25 NOTE — PROGRESS NOTES
10/25/22 0957   Team Meeting   Meeting Type Daily Rounds   Initial Conference Date 10/25/22   Next Conference Date 10/26/22   Team Members Present   Team Members Present Physician;Nurse;;; Occupational Therapist   Patient/Family Present   Patient Present No   Patient's Family Present No     JOSEFINA Lay Anand Filomena - 2/4, 5/10, taking meds, quarantine off today and will be going down to the 3rd floor, resistance to meds, guarded, paranoid

## 2022-10-25 NOTE — PROGRESS NOTES
Progress Note - Kaykay Reid 33 y o  male MRN: [id number]  Unit/Bed#: [facility] 040-56 Encounter: [id number]    Patient was seen today for continuation of care, records reviewed and patient was discussed with the morning case review team   He received PRN Atrarax yesterday for increased anxiety  Yvonne Rico was seen today for psychiatric follow-up  On assessment today, Yvonne Rico was found laying in bed  On approach, he is irritable and refusing to cooperate with psychiatric assessment  He asks this writer to leave and "not come back"  He is still paranoid, guarded, bizarre, and antagnostic  He was told that he will still be transferred to the 3rd floor once a bed becomes available, he yelled as this writer was closing the door "I served my time, you can't keep me here, my sentence is up!"  Although he refuses to participate in assessment, he does not display any behaviors that would indicate suicidal ideations  Yvonne iRco denies acute suicidal/self-harm ideation/intent/plan upon direct inquiry at this time  Yvonne Rico remains adherent to his current psychotropic medication regimen and denies any side effects from medications, as well as none noted on exam     Will increase Geodon to 40mg PO BID for ongoing psychosis including paranoid and grandiose delusions  Plan to transfer to 3rd floor once a bed is available for ongoing medication adjustments, milieu therapy, and to continue with safe discharge planning  Vitals:  Vitals:    10/25/22 0700   BP: 131/72   Pulse: 63   Resp: 18   Temp: 97 7 °F (36 5 °C)   SpO2: 100%     Laboratory Results:    I have personally reviewed all pertinent laboratory/tests results    Most Recent Labs:   Lab Results   Component Value Date    WBC 5 73 10/17/2022    RBC 4 04 10/17/2022    HGB 12 6 10/17/2022    HCT 38 4 10/17/2022     10/17/2022    RDW 12 2 10/17/2022    NEUTROABS 2 89 10/17/2022    SODIUM 137 10/18/2022    K 4 0 10/18/2022     10/18/2022 CO2 29 10/18/2022    BUN 7 10/18/2022    CREATININE 0 70 10/18/2022    GLUC 100 (H) 10/18/2022    CALCIUM 9 1 10/18/2022    AST 37 10/18/2022    ALT 44 10/18/2022    ALKPHOS 44 10/18/2022    TP 7 2 10/18/2022    ALB 4 4 10/18/2022    TBILI 0 77 10/18/2022    CHOLESTEROL 161 10/15/2022    HDL 34 (L) 10/15/2022    TRIG 104 10/15/2022    LDLCALC 106 10/15/2022    [place] 127 10/15/2022    ICW0KXCCEGLX 3 290 10/15/2022    RPR Non-Reactive 10/15/2022     Psychiatric Review of Systems:  Behavior over the last 24 hours:  unchanged  Sleep:  Slept throughout the night  Appetite: adequate  Medication side effects: patient refused to answer  ROS: no complaints, denies shortness of breath or chest pain and all other systems are negative for acute changes    Mental Status Evaluation:  Appearance:  Laying in bed   Behavior:  bizarre, uncooperative   Speech:  scant   Mood:  irritable   Affect:  constricted   Thought Process:  unable to assess   Thought Content:  paranoid delusions, suspicious, mistrustful   Perceptual Disturbances: appears distracted, appears preoccupied, does not appear responding to internal stimuli   Risk Potential: Suicidal ideation - None at present, contracts for safety on the unit, would talk to staff if not feeling safe on the unit  Homicidal ideation - None at present  Potential for aggression - Not at present   Memory:  recent memory intact   Sensorium  person      Consciousness:  alert and awake   Attention: attention span and concentration: unable to assess due to lack of cooperation   Insight:  poor   Judgment: poor   Gait/Station: in bed   Motor Activity: no abnormal movements   Progress Toward Goals:   Kate Spear is progressing towards goals of inpatient psychiatric treatment by continued medication compliance and is attending therapeutic modalities on the milieu   However, the patient continues to require inpatient psychiatric hospitalization for continued medication management and titration to optimize symptom reduction, improve sleep hygiene, and demonstrate adequate self-care  Assessment/Plan   Principal Problem:    Paranoia (psychosis) ([address] )  Active Problems:    Wrist laceration    PTSD (post-traumatic stress disorder)    COVID    Medical clearance for psychiatric admission    Major depression with psychotic features [facility])    Urinary hesitancy    Recommended Treatment: Treatment plan and medication changes discussed and per the attending physician the plan is: 1  Continue with group therapy, milieu therapy and occupational therapy  2  Behavioral Health checks every 7 minutes  3  Continue frequent safety checks and vitals per unit protocol  4  Continue with SLIM medical management as indicated  5  Continue with current medication regimen  6  Will review labs in the a m  7 Disposition Planning: Discharge planning and efforts remain ongoing    Behavioral Health Medications: all current active meds have been reviewed and continue current psychiatric medications    Current Facility-Administered Medications   Medication Dose Route Frequency Provider Last Rate   • aluminum-magnesium hydroxide-simethicone  30 mL Oral Q4H PRN Forestine MD Claudy     • artificial tear  1 application Both Eyes C9M PRN Kim Loco MD     • haloperidol lactate  2 5 mg Intramuscular Q6H PRN Max 4/day Forestine MD Claudy      And   • LORazepam  1 mg Intramuscular Q6H PRN Max 4/day Forestine MD Claudy      And   • benztropine  0 5 mg Intramuscular Q6H PRN Max 4/day Forestine MD Claudy     • haloperidol lactate  5 mg Intramuscular Q4H PRN Max 4/day Forestine MD Claudy      And   • LORazepam  2 mg Intramuscular Q4H PRN Max 4/day Forestine MD Claudy      And   • benztropine  1 mg Intramuscular Q4H PRN Max 4/day Forestine MD Claudy     • benztropine  1 mg Intramuscular Q4H PRN Max 6/day Forestine MD Claudy     • benztropine  1 mg Oral Q4H PRN Max 6/day Forestine MD Claudy     • hydrOXYzine HCL  50 mg Oral Q6H PRN Max 4/day Edgar Ramsay MD      Or   • diphenhydrAMINE  50 mg Intramuscular Q6H PRN Edgar Ramsay MD     • haloperidol  2 mg Oral Q4H PRN Max 6/day Edgar Ramsay MD     • haloperidol  5 mg Oral Q6H PRN Max 4/day Edgar Ramsay MD     • haloperidol  5 mg Oral Q4H PRN Max 4/day Edgar Ramsay MD     • hydrOXYzine HCL  100 mg Oral Q6H PRN Max 4/day Edgar Ramsay MD      Or   • LORazepam  2 mg Intramuscular Q6H PRN Edgar Ramsay MD     • hydrOXYzine HCL  25 mg Oral Q6H PRN Max 4/day Edgar Ramsay MD     • ibuprofen  200 mg Oral Q6H PRN Marko Anne MD     • ibuprofen  400 mg Oral Q6H PRN Marko Anne MD     • nicotine  14 mg Transdermal Daily Liliane Medrano DO     • polyethylene glycol  17 g Oral Daily PRN Edgar Ramsay MD     • propranolol  10 mg Oral Q8H PRN Edgar Ramsay MD     • psyllium  1 packet Oral Daily Liliane Medrano DO     • senna-docusate sodium  1 tablet Oral Daily PRN Edgar Ramsay MD     • ziprasidone  40 mg Oral BID With Meals GEE Meneses       Risks / Benefits of Treatment:  Risks, benefits, and possible side effects of medications explained to patient  Patient has limited understanding of risks and benefits of treatment at this time, but agrees to take medications as prescribed  Counseling / Coordination of Care:  Patient's progress reviewed with nursing staff  Medications, treatment progress and treatment plan reviewed with patient  Supportive counseling provided to the patient  Total floor/unit time spent today 25 minutes  Greater than 50% of total time was spent with the patient and / or family counseling and / or coordination of care  A description of the counseling / coordination of care: medication education, treatment plan, supportive therapy

## 2022-10-25 NOTE — CASE MANAGEMENT
302 was initiated today for patient  Patient was advised of his rights  Pt also requested to have his sister Carisa Dexter) and mother Manpreet Sanjay) added as a contact, he signed an LUKAS for both

## 2022-10-25 NOTE — NURSING NOTE
Patient is very irritable, hostile, and demanding to leave now  He said he is been held against his will  Provider made aware  Patient signed his 72 hrs notice at [id number]  PRN atrax given for anxiety rated high at 1149  Patient is in bed at the moment, will continue to monitor

## 2022-10-25 NOTE — NURSING NOTE
Patient visible for majority of shift, compliant with meals and scheduled medications  Currently reporting 2/4 anxiety, 5/10 depression, denies SI/HI/AH/VH  Able to make needs known  Patient requested and received PRN Motrin 200 mg for 3/10 left forearm pain which was effective

## 2022-10-26 ENCOUNTER — HOSPITAL ENCOUNTER (INPATIENT)
Facility: HOSPITAL | Age: 32
LOS: 16 days | Discharge: HOME | End: 2022-11-11
Attending: STUDENT IN AN ORGANIZED HEALTH CARE EDUCATION/TRAINING PROGRAM | Admitting: STUDENT IN AN ORGANIZED HEALTH CARE EDUCATION/TRAINING PROGRAM

## 2022-10-26 VITALS
SYSTOLIC BLOOD PRESSURE: 151 MMHG | HEIGHT: 75 IN | DIASTOLIC BLOOD PRESSURE: 74 MMHG | BODY MASS INDEX: 24.82 KG/M2 | TEMPERATURE: 98.9 F | WEIGHT: 199.6 LBS | HEART RATE: 83 BPM | OXYGEN SATURATION: 95 % | RESPIRATION RATE: 18 BRPM

## 2022-10-26 DIAGNOSIS — F32.3 MAJOR DEPRESSION WITH PSYCHOTIC FEATURES (HCC): Primary | ICD-10-CM

## 2022-10-26 DIAGNOSIS — F32.A DEPRESSION: ICD-10-CM

## 2022-10-26 DIAGNOSIS — F41.9 ANXIETY: ICD-10-CM

## 2022-10-26 DIAGNOSIS — G47.00 INSOMNIA: ICD-10-CM

## 2022-10-26 DIAGNOSIS — Z00.8 MEDICAL CLEARANCE FOR PSYCHIATRIC ADMISSION: ICD-10-CM

## 2022-10-26 PROCEDURE — 99238 HOSP IP/OBS DSCHRG MGMT 30/<: CPT | Performed by: STUDENT IN AN ORGANIZED HEALTH CARE EDUCATION/TRAINING PROGRAM

## 2022-10-26 RX ORDER — IBUPROFEN 200 MG
200 TABLET ORAL EVERY 6 HOURS PRN
Status: CANCELLED | OUTPATIENT
Start: 2022-10-26

## 2022-10-26 RX ORDER — IBUPROFEN 600 MG/1
600 TABLET ORAL EVERY 8 HOURS PRN
Status: CANCELLED | OUTPATIENT
Start: 2022-10-26

## 2022-10-26 RX ORDER — MINERAL OIL AND PETROLATUM 150; 830 MG/G; MG/G
1 OINTMENT OPHTHALMIC
Status: DISCONTINUED | OUTPATIENT
Start: 2022-10-26 | End: 2022-11-11 | Stop reason: HOSPADM

## 2022-10-26 RX ORDER — PROPRANOLOL HYDROCHLORIDE 10 MG/1
10 TABLET ORAL EVERY 8 HOURS PRN
Status: CANCELLED | OUTPATIENT
Start: 2022-10-26

## 2022-10-26 RX ORDER — HALOPERIDOL 5 MG/ML
5 INJECTION INTRAMUSCULAR
Status: CANCELLED | OUTPATIENT
Start: 2022-10-26

## 2022-10-26 RX ORDER — POLYETHYLENE GLYCOL 3350 17 G/17G
17 POWDER, FOR SOLUTION ORAL DAILY PRN
Status: CANCELLED | OUTPATIENT
Start: 2022-10-26

## 2022-10-26 RX ORDER — OLANZAPINE 10 MG/1
10 INJECTION, POWDER, LYOPHILIZED, FOR SOLUTION INTRAMUSCULAR
Status: CANCELLED | OUTPATIENT
Start: 2022-10-26

## 2022-10-26 RX ORDER — MINERAL OIL AND PETROLATUM 150; 830 MG/G; MG/G
1 OINTMENT OPHTHALMIC
Status: CANCELLED | OUTPATIENT
Start: 2022-10-26

## 2022-10-26 RX ORDER — HYDROXYZINE 50 MG/1
50 TABLET, FILM COATED ORAL
Status: CANCELLED | OUTPATIENT
Start: 2022-10-26

## 2022-10-26 RX ORDER — BENZTROPINE MESYLATE 1 MG/ML
0.5 INJECTION INTRAMUSCULAR; INTRAVENOUS
Status: CANCELLED | OUTPATIENT
Start: 2022-10-26

## 2022-10-26 RX ORDER — HALOPERIDOL 5 MG/1
5 TABLET ORAL
Status: DISCONTINUED | OUTPATIENT
Start: 2022-10-26 | End: 2022-11-11 | Stop reason: HOSPADM

## 2022-10-26 RX ORDER — HALOPERIDOL 5 MG/1
5 TABLET ORAL
Status: CANCELLED | OUTPATIENT
Start: 2022-10-26

## 2022-10-26 RX ORDER — AMOXICILLIN 250 MG
1 CAPSULE ORAL DAILY PRN
Status: CANCELLED | OUTPATIENT
Start: 2022-10-26

## 2022-10-26 RX ORDER — BENZTROPINE MESYLATE 1 MG/ML
0.5 INJECTION INTRAMUSCULAR; INTRAVENOUS
Status: DISCONTINUED | OUTPATIENT
Start: 2022-10-26 | End: 2022-11-11 | Stop reason: HOSPADM

## 2022-10-26 RX ORDER — HALOPERIDOL 1 MG/1
2 TABLET ORAL
Status: CANCELLED | OUTPATIENT
Start: 2022-10-26

## 2022-10-26 RX ORDER — OLANZAPINE 10 MG/1
10 INJECTION, POWDER, LYOPHILIZED, FOR SOLUTION INTRAMUSCULAR
Status: DISCONTINUED | OUTPATIENT
Start: 2022-10-26 | End: 2022-11-11 | Stop reason: HOSPADM

## 2022-10-26 RX ORDER — IBUPROFEN 400 MG/1
400 TABLET ORAL EVERY 6 HOURS PRN
Status: DISCONTINUED | OUTPATIENT
Start: 2022-10-26 | End: 2022-11-11 | Stop reason: HOSPADM

## 2022-10-26 RX ORDER — LORAZEPAM 2 MG/ML
1 INJECTION INTRAMUSCULAR
Status: DISCONTINUED | OUTPATIENT
Start: 2022-10-26 | End: 2022-11-11 | Stop reason: HOSPADM

## 2022-10-26 RX ORDER — HALOPERIDOL 1 MG/1
2 TABLET ORAL
Status: DISCONTINUED | OUTPATIENT
Start: 2022-10-26 | End: 2022-11-11 | Stop reason: HOSPADM

## 2022-10-26 RX ORDER — LORAZEPAM 2 MG/ML
2 INJECTION INTRAMUSCULAR
Status: DISCONTINUED | OUTPATIENT
Start: 2022-10-26 | End: 2022-11-11 | Stop reason: HOSPADM

## 2022-10-26 RX ORDER — LANOLIN ALCOHOL/MO/W.PET/CERES
3 CREAM (GRAM) TOPICAL
Status: CANCELLED | OUTPATIENT
Start: 2022-10-26

## 2022-10-26 RX ORDER — ZIPRASIDONE HYDROCHLORIDE 40 MG/1
40 CAPSULE ORAL 2 TIMES DAILY WITH MEALS
Status: CANCELLED | OUTPATIENT
Start: 2022-10-26

## 2022-10-26 RX ORDER — BENZTROPINE MESYLATE 1 MG/1
1 TABLET ORAL
Status: DISCONTINUED | OUTPATIENT
Start: 2022-10-26 | End: 2022-11-11 | Stop reason: HOSPADM

## 2022-10-26 RX ORDER — MAGNESIUM HYDROXIDE/ALUMINUM HYDROXICE/SIMETHICONE 120; 1200; 1200 MG/30ML; MG/30ML; MG/30ML
30 SUSPENSION ORAL EVERY 4 HOURS PRN
Status: DISCONTINUED | OUTPATIENT
Start: 2022-10-26 | End: 2022-11-11 | Stop reason: HOSPADM

## 2022-10-26 RX ORDER — HALOPERIDOL 5 MG/ML
5 INJECTION INTRAMUSCULAR
Status: DISCONTINUED | OUTPATIENT
Start: 2022-10-26 | End: 2022-11-11 | Stop reason: HOSPADM

## 2022-10-26 RX ORDER — HYDROXYZINE 50 MG/1
100 TABLET, FILM COATED ORAL
Status: CANCELLED | OUTPATIENT
Start: 2022-10-26

## 2022-10-26 RX ORDER — IBUPROFEN 400 MG/1
400 TABLET ORAL EVERY 6 HOURS PRN
Status: DISCONTINUED | OUTPATIENT
Start: 2022-10-26 | End: 2022-10-26 | Stop reason: SDUPTHER

## 2022-10-26 RX ORDER — IBUPROFEN 400 MG/1
200 TABLET ORAL EVERY 6 HOURS PRN
Status: DISCONTINUED | OUTPATIENT
Start: 2022-10-26 | End: 2022-11-11 | Stop reason: HOSPADM

## 2022-10-26 RX ORDER — OLANZAPINE 2.5 MG/1
2.5 TABLET ORAL
Status: DISCONTINUED | OUTPATIENT
Start: 2022-10-26 | End: 2022-11-11 | Stop reason: HOSPADM

## 2022-10-26 RX ORDER — IBUPROFEN 400 MG/1
400 TABLET ORAL EVERY 6 HOURS PRN
Status: CANCELLED | OUTPATIENT
Start: 2022-10-26

## 2022-10-26 RX ORDER — HALOPERIDOL 5 MG/ML
2.5 INJECTION INTRAMUSCULAR
Status: DISCONTINUED | OUTPATIENT
Start: 2022-10-26 | End: 2022-11-11 | Stop reason: HOSPADM

## 2022-10-26 RX ORDER — ACETAMINOPHEN 325 MG/1
650 TABLET ORAL EVERY 6 HOURS PRN
Status: DISCONTINUED | OUTPATIENT
Start: 2022-10-26 | End: 2022-11-11 | Stop reason: HOSPADM

## 2022-10-26 RX ORDER — LORAZEPAM 2 MG/ML
2 INJECTION INTRAMUSCULAR EVERY 6 HOURS PRN
Status: DISCONTINUED | OUTPATIENT
Start: 2022-10-26 | End: 2022-11-11 | Stop reason: HOSPADM

## 2022-10-26 RX ORDER — OLANZAPINE 5 MG/1
5 TABLET ORAL
Status: DISCONTINUED | OUTPATIENT
Start: 2022-10-26 | End: 2022-11-11 | Stop reason: HOSPADM

## 2022-10-26 RX ORDER — OLANZAPINE 10 MG/1
5 INJECTION, POWDER, LYOPHILIZED, FOR SOLUTION INTRAMUSCULAR
Status: CANCELLED | OUTPATIENT
Start: 2022-10-26

## 2022-10-26 RX ORDER — HYDROXYZINE 50 MG/1
100 TABLET, FILM COATED ORAL
Status: DISCONTINUED | OUTPATIENT
Start: 2022-10-26 | End: 2022-11-11 | Stop reason: HOSPADM

## 2022-10-26 RX ORDER — MAGNESIUM HYDROXIDE/ALUMINUM HYDROXICE/SIMETHICONE 120; 1200; 1200 MG/30ML; MG/30ML; MG/30ML
30 SUSPENSION ORAL EVERY 4 HOURS PRN
Status: CANCELLED | OUTPATIENT
Start: 2022-10-26

## 2022-10-26 RX ORDER — OLANZAPINE 10 MG/1
10 TABLET ORAL
Status: DISCONTINUED | OUTPATIENT
Start: 2022-10-26 | End: 2022-11-11 | Stop reason: HOSPADM

## 2022-10-26 RX ORDER — OLANZAPINE 5 MG/1
5 TABLET ORAL
Status: CANCELLED | OUTPATIENT
Start: 2022-10-26

## 2022-10-26 RX ORDER — LORAZEPAM 2 MG/ML
2 INJECTION INTRAMUSCULAR
Status: CANCELLED | OUTPATIENT
Start: 2022-10-26

## 2022-10-26 RX ORDER — IBUPROFEN 600 MG/1
600 TABLET ORAL EVERY 8 HOURS PRN
Status: DISCONTINUED | OUTPATIENT
Start: 2022-10-26 | End: 2022-11-11 | Stop reason: HOSPADM

## 2022-10-26 RX ORDER — IBUPROFEN 400 MG/1
200 TABLET ORAL EVERY 6 HOURS PRN
Status: DISCONTINUED | OUTPATIENT
Start: 2022-10-26 | End: 2022-10-26

## 2022-10-26 RX ORDER — LORAZEPAM 2 MG/ML
1 INJECTION INTRAMUSCULAR
Status: CANCELLED | OUTPATIENT
Start: 2022-10-26

## 2022-10-26 RX ORDER — PROPRANOLOL HYDROCHLORIDE 10 MG/1
10 TABLET ORAL EVERY 8 HOURS PRN
Status: DISCONTINUED | OUTPATIENT
Start: 2022-10-26 | End: 2022-11-11 | Stop reason: HOSPADM

## 2022-10-26 RX ORDER — DIPHENHYDRAMINE HYDROCHLORIDE 50 MG/ML
50 INJECTION INTRAMUSCULAR; INTRAVENOUS EVERY 6 HOURS PRN
Status: DISCONTINUED | OUTPATIENT
Start: 2022-10-26 | End: 2022-11-11 | Stop reason: HOSPADM

## 2022-10-26 RX ORDER — NICOTINE 21 MG/24HR
1 PATCH, TRANSDERMAL 24 HOURS TRANSDERMAL DAILY
Qty: 28 PATCH | Refills: 0 | Status: ON HOLD
Start: 2022-10-27

## 2022-10-26 RX ORDER — ACETAMINOPHEN 325 MG/1
650 TABLET ORAL EVERY 6 HOURS PRN
Status: CANCELLED | OUTPATIENT
Start: 2022-10-26

## 2022-10-26 RX ORDER — BENZTROPINE MESYLATE 1 MG/1
1 TABLET ORAL
Status: DISCONTINUED | OUTPATIENT
Start: 2022-10-26 | End: 2022-10-26 | Stop reason: SDUPTHER

## 2022-10-26 RX ORDER — BENZTROPINE MESYLATE 1 MG/ML
1 INJECTION INTRAMUSCULAR; INTRAVENOUS
Status: CANCELLED | OUTPATIENT
Start: 2022-10-26

## 2022-10-26 RX ORDER — POLYETHYLENE GLYCOL 3350 17 G/17G
17 POWDER, FOR SOLUTION ORAL DAILY PRN
Status: DISCONTINUED | OUTPATIENT
Start: 2022-10-26 | End: 2022-11-11 | Stop reason: HOSPADM

## 2022-10-26 RX ORDER — HYDROXYZINE HYDROCHLORIDE 25 MG/1
25 TABLET, FILM COATED ORAL
Status: CANCELLED | OUTPATIENT
Start: 2022-10-26

## 2022-10-26 RX ORDER — BENZTROPINE MESYLATE 1 MG/ML
1 INJECTION INTRAMUSCULAR; INTRAVENOUS
Status: DISCONTINUED | OUTPATIENT
Start: 2022-10-26 | End: 2022-11-11 | Stop reason: HOSPADM

## 2022-10-26 RX ORDER — BENZTROPINE MESYLATE 1 MG/1
1 TABLET ORAL
Status: CANCELLED | OUTPATIENT
Start: 2022-10-26

## 2022-10-26 RX ORDER — HYDROXYZINE HYDROCHLORIDE 25 MG/1
25 TABLET, FILM COATED ORAL
Status: DISCONTINUED | OUTPATIENT
Start: 2022-10-26 | End: 2022-11-11 | Stop reason: HOSPADM

## 2022-10-26 RX ORDER — OLANZAPINE 10 MG/1
5 INJECTION, POWDER, LYOPHILIZED, FOR SOLUTION INTRAMUSCULAR
Status: DISCONTINUED | OUTPATIENT
Start: 2022-10-26 | End: 2022-11-11 | Stop reason: HOSPADM

## 2022-10-26 RX ORDER — OLANZAPINE 2.5 MG/1
2.5 TABLET ORAL
Status: CANCELLED | OUTPATIENT
Start: 2022-10-26

## 2022-10-26 RX ORDER — ZIPRASIDONE HYDROCHLORIDE 40 MG/1
40 CAPSULE ORAL 2 TIMES DAILY WITH MEALS
Qty: 60 CAPSULE | Refills: 1 | Status: ON HOLD
Start: 2022-10-26

## 2022-10-26 RX ORDER — OLANZAPINE 10 MG/1
10 TABLET ORAL
Status: CANCELLED | OUTPATIENT
Start: 2022-10-26

## 2022-10-26 RX ORDER — LORAZEPAM 2 MG/ML
2 INJECTION INTRAMUSCULAR EVERY 6 HOURS PRN
Status: CANCELLED | OUTPATIENT
Start: 2022-10-26

## 2022-10-26 RX ORDER — NICOTINE 21 MG/24HR
14 PATCH, TRANSDERMAL 24 HOURS TRANSDERMAL DAILY
Status: DISCONTINUED | OUTPATIENT
Start: 2022-10-27 | End: 2022-11-11 | Stop reason: HOSPADM

## 2022-10-26 RX ORDER — DIPHENHYDRAMINE HYDROCHLORIDE 50 MG/ML
50 INJECTION INTRAMUSCULAR; INTRAVENOUS EVERY 6 HOURS PRN
Status: CANCELLED | OUTPATIENT
Start: 2022-10-26

## 2022-10-26 RX ORDER — AMOXICILLIN 250 MG
1 CAPSULE ORAL DAILY PRN
Status: DISCONTINUED | OUTPATIENT
Start: 2022-10-26 | End: 2022-11-11 | Stop reason: HOSPADM

## 2022-10-26 RX ORDER — NICOTINE 21 MG/24HR
14 PATCH, TRANSDERMAL 24 HOURS TRANSDERMAL DAILY
Status: CANCELLED | OUTPATIENT
Start: 2022-10-27

## 2022-10-26 RX ORDER — HYDROXYZINE 50 MG/1
50 TABLET, FILM COATED ORAL
Status: DISCONTINUED | OUTPATIENT
Start: 2022-10-26 | End: 2022-11-11 | Stop reason: HOSPADM

## 2022-10-26 RX ORDER — BENZTROPINE MESYLATE 1 MG/ML
1 INJECTION INTRAMUSCULAR; INTRAVENOUS
Status: DISCONTINUED | OUTPATIENT
Start: 2022-10-26 | End: 2022-10-26 | Stop reason: SDUPTHER

## 2022-10-26 RX ORDER — LANOLIN ALCOHOL/MO/W.PET/CERES
3 CREAM (GRAM) TOPICAL
Status: DISCONTINUED | OUTPATIENT
Start: 2022-10-26 | End: 2022-11-07

## 2022-10-26 RX ORDER — HALOPERIDOL 5 MG/ML
2.5 INJECTION INTRAMUSCULAR
Status: CANCELLED | OUTPATIENT
Start: 2022-10-26

## 2022-10-26 RX ORDER — ZIPRASIDONE HYDROCHLORIDE 40 MG/1
40 CAPSULE ORAL 2 TIMES DAILY WITH MEALS
Status: DISCONTINUED | OUTPATIENT
Start: 2022-10-26 | End: 2022-10-27

## 2022-10-26 RX ADMIN — PSYLLIUM HUSK 1 PACKET: 3.4 POWDER ORAL at 08:22

## 2022-10-26 RX ADMIN — HALOPERIDOL 2 MG: 1 TABLET ORAL at 12:32

## 2022-10-26 RX ADMIN — IBUPROFEN 200 MG: 200 TABLET, FILM COATED ORAL at 05:40

## 2022-10-26 RX ADMIN — ZIPRASIDONE HYDROCHLORIDE 40 MG: 40 CAPSULE ORAL at 17:32

## 2022-10-26 RX ADMIN — NICOTINE 14 MG: 14 PATCH, EXTENDED RELEASE TRANSDERMAL at 08:23

## 2022-10-26 RX ADMIN — ZIPRASIDONE HYDROCHLORIDE 40 MG: 40 CAPSULE ORAL at 08:22

## 2022-10-26 NOTE — PLAN OF CARE
Problem: INFECTION - ADULT  Goal: Absence or prevention of progression during hospitalization  Description: INTERVENTIONS:  - Assess and monitor for signs and symptoms of infection  - Monitor lab/diagnostic results  - Monitor all insertion sites, i e  indwelling lines, tubes, and drains  - Monitor endotracheal if appropriate and nasal secretions for changes in amount and color  - [place] appropriate cooling/warming therapies per order  - Administer medications as ordered  - Instruct and encourage patient and family to use good hand hygiene technique  - Identify and instruct in appropriate isolation precautions for identified infection/condition  Outcome: Progressing  Goal: Absence of fever/infection during neutropenic period  Description: INTERVENTIONS:  - Monitor WBC    Outcome: Progressing     Problem: Ineffective Coping  Goal: Cooperates with admission process  Description: Interventions:   - Complete admission process  Outcome: Progressing  Goal: Identifies ineffective coping skills  Outcome: Progressing  Goal: Identifies healthy coping skills  Outcome: Progressing  Goal: Demonstrates healthy coping skills  Outcome: Progressing  Goal: Participates in unit activities  Description: Interventions:  - Provide therapeutic environment   - Provide required programming   - Redirect inappropriate behaviors   Outcome: Progressing  Goal: Patient/Family participate in treatment and DC plans  Description: Interventions:  - Provide therapeutic environment  Outcome: Progressing  Goal: Patient/Family verbalizes awareness of resources  Outcome: Progressing  Goal: Understands least restrictive measures  Description: Interventions:  - Utilize least restrictive behavior  Outcome: Progressing  Goal: Free from restraint events  Description: - Utilize least restrictive measures   - Provide behavioral interventions   - Redirect inappropriate behaviors   Outcome: Progressing     Problem: Risk for Self Injury/Neglect  Goal: Treatment Goal: Remain safe during length of stay, learn and adopt new coping skills, and be free of self-injurious ideation, impulses and acts at the time of discharge  Outcome: Progressing  Goal: Verbalize thoughts and feelings  Description: Interventions:  - Assess and re-assess patient's lethality and potential for self-injury  - Engage patient in 1:1 interactions, daily, for a minimum of 15 minutes  - Encourage patient to express feelings, fears, frustrations, hopes  - Establish rapport/trust with patient   Outcome: Progressing  Goal: Refrain from harming self  Description: Interventions:  - Monitor patient closely, per order  - Develop a trusting relationship  - Supervise medication ingestion, monitor effects and side effects   Outcome: Progressing  Goal: Attend and participate in unit activities, including therapeutic, recreational, and educational groups  Description: Interventions:  - Provide therapeutic and educational activities daily, encourage attendance and participation, and document same in the medical record  - Obtain collateral information, encourage visitation and family involvement in care   Outcome: Progressing  Goal: Recognize maladaptive responses and adopt new coping mechanisms  Outcome: Progressing  Goal: Complete daily ADLs, including personal hygiene independently, as able  Description: Interventions:  - Observe, teach, and assist patient with ADLS  - Monitor and promote a balance of rest/activity, with adequate nutrition and elimination  Outcome: Progressing     Problem: Depression  Goal: Treatment Goal: Demonstrate behavioral control of depressive symptoms, verbalize feelings of improved mood/affect, and adopt new coping skills prior to discharge  Outcome: Progressing  Goal: Verbalize thoughts and feelings  Description: Interventions:  - Assess and re-assess patient's level of risk   - Engage patient in 1:1 interactions, daily, for a minimum of 15 minutes   - Encourage patient to express feelings, fears, frustrations, hopes   Outcome: Progressing  Goal: Refrain from harming self  Description: Interventions:  - Monitor patient closely, per order   - Supervise medication ingestion, monitor effects and side effects   Outcome: Progressing  Goal: Refrain from isolation  Description: Interventions:  - Develop a trusting relationship   - Encourage socialization   Outcome: Progressing  Goal: Refrain from self-neglect  Outcome: Progressing  Goal: Attend and participate in unit activities, including therapeutic, recreational, and educational groups  Description: Interventions:  - Provide therapeutic and educational activities daily, encourage attendance and participation, and document same in the medical record   Outcome: Progressing  Goal: Complete daily ADLs, including personal hygiene independently, as able  Description: Interventions:  - Observe, teach, and assist patient with ADLS  -  Monitor and promote a balance of rest/activity, with adequate nutrition and elimination   Outcome: Progressing     Problem: Anxiety  Goal: Anxiety is at manageable level  Description: Interventions:  - Assess and monitor patient's anxiety level  - Monitor for signs and symptoms (heart palpitations, chest pain, shortness of breath, headaches, nausea, feeling jumpy, restlessness, irritable, apprehensive)  - Collaborate with interdisciplinary team and initiate plan and interventions as ordered    - [place] patient to unit/surroundings  - Explain treatment plan  - Encourage participation in care  - Encourage verbalization of concerns/fears  - Identify coping mechanisms  - Assist in developing anxiety-reducing skills  - Administer/offer alternative therapies  - Limit or eliminate stimulants  Outcome: Progressing

## 2022-10-26 NOTE — WOUND OSTOMY CARE
Progress Note - Wound   Peter Pierson 33 y o  male MRN: [id number]  Unit/Bed#: Arnold Tariq 596-90 Encounter: [id number]       History and Present Illness:  Wound care initially consulted on 10/14 at [place], 33 y o male [facility] after he overdosed on Benadryl and lacerated his own wrists  Wrists laceration repaired by hand surgery who had wound care assess as well    Patient has history of PTSD  Patient transferred to [facility]  He is now being transferred to [address] [place] [address]  Patient may have sutures removed  Assessment:   Bilateral wrist incisions dry with sutures intact no drainage no erythema   Per Medical records dated 10/12/22 suture removal on or around 10/26/22 per Kayla Vergara PA-C  Plan: Scheduled for suture removal on or around 10/26 22  Updated Rn with suture removal    wound Care will sign off      Wound 10/12/22 Other (comment) Arm Anterior;Distal;Left (Active)   Wound Image   10/26/22 1434   Wound Description Dry; Intact 10/26/22 1434   Wound Length (cm) 3 5 cm 10/26/22 1434   Wound Width (cm) 0 1 cm 10/26/22 1434   Wound Depth (cm) 0 1 cm 10/26/22 1434   Wound Surface Area (cm^2) 0 35 cm^2 10/26/22 1434   Wound Volume (cm^3) 0 035 cm^3 10/26/22 1434   Calculated Wound Volume (cm^3) 0 04 cm^3 10/26/22 1434   Drainage Amount None 10/26/22 1434   Treatments Cleansed;Site care;Elevated 10/26/22 1434   Dressing Dry dressing;Xeroform 10/26/22 1434   Dressing Changed Changed 10/26/22 1434   Patient Tolerance Tolerated well 10/26/22 1434   Dressing Status Clean; Intact;Dry 10/26/22 1434       Wound 10/21/22 Arm Anterior;Distal;Right; Lower (Active)   Wound Image   10/26/22 1434   Wound Description Dry; Intact 10/26/22 1434   [place]-wound Assessment Intact;Dry;Clean 10/21/22 1333   Wound Length (cm) 3 cm 10/26/22 1434   Wound Width (cm) 0 1 cm 10/26/22 1434   Wound Surface Area (cm^2) 0 3 cm^2 10/26/22 1434   Drainage Amount None 10/26/22 1434 Treatments Site care 10/26/22 1434   Dressing Dry dressing;Vaseline gauze 10/26/22 1434   Dressing Changed Changed 10/26/22 1434   Patient Tolerance Tolerated well 10/26/22 1434   Dressing Status Clean;Dry; Intact 10/26/22 1434       Wound Care will sign off  Call or Tigertext with any questions    Xavier MOHANN RN

## 2022-10-26 NOTE — NURSING NOTE
Patient is visible on unit, cooperative with care  Pt reported 2/4 anxiety, Atarax 100mg given at 1750, was effective  Denies depression, denies SI,HI,AVH  Medication and meal compliant  Pt requested a copy of 72hr that he signed today, was given  Will continue to maintain q7 min safety checks

## 2022-10-26 NOTE — CASE MANAGEMENT
Pt requested CM to come talk to him today  Pt questioned the 303 process and hearing  Pt gave me a letter today with specific requests asking to speak to the  for his 303 hearing (I reached out to that office to advise), visitation with family (we do not have in person visitation), and an outside/independent physician to look at his arm injury because no one here can confirm what is going on with it  He also asked for envelopes and stamps, I gave him some envelopes

## 2022-10-26 NOTE — PROGRESS NOTES
10/26/22 1002   Team Meeting   Initial Conference Date 10/26/22   Next Conference Date 10/27/22   Team Members Present   Team Members Present Physician;Nurse;;; Occupational Therapist   Patient/Family Present   Patient Present No   Patient's Family Present No     JOSEFINA Leon T Trittenbach - angry about going to the 3rd floor and the 302, should be moving to the 3rd floor today, paranoid, disheveled, resistant to meds, 303 hearing Friday

## 2022-10-26 NOTE — NURSING NOTE
Pt is 28yo M 302 transferred from  after being covid positive in [facility] ED on October 15th  Pt came to the ER for evaluation after cutting wrists bilaterally requiring sutures and OD'ing on benadryl  Pt was made a 302 while on 6T after refusing to rescind 72 hr notice  Pt arrived to unit at 1525 with transport, assessment and 2 RN skin check complete  Dressings secure on wrists, sutures in place due to come out after today  Pt states he is here because "I was suicidal  I poisoned myself and I was also poisoned " Pt has a hx of depression and PTSD  Pt is calm and pleasant during interview but is guarded with constricted affect  Denies SI/HI, AVH, depression or anxiety  Paranoid delusions present  When pt was asked to discuss what caused the suicide attempt, pt says "I'm not comfortable talking about that because that will put me in danger " Pt also stated that others in his house were trying to poison him with drugs but it was not friends or family  No agitation  Oriented to unit, contracts for safety  Nicotine patch on, smokes 1 pack per day  No requests at this time

## 2022-10-26 NOTE — PLAN OF CARE
Problem: INFECTION - ADULT  Goal: Absence or prevention of progression during hospitalization  Description: INTERVENTIONS:  - Assess and monitor for signs and symptoms of infection  - Monitor lab/diagnostic results  - Monitor all insertion sites, i e  indwelling lines, tubes, and drains  - Monitor endotracheal if appropriate and nasal secretions for changes in amount and color  - Orr appropriate cooling/warming therapies per order  - Administer medications as ordered  - Instruct and encourage patient and family to use good hand hygiene technique  - Identify and instruct in appropriate isolation precautions for identified infection/condition  Outcome: Adequate for Discharge  Goal: Absence of fever/infection during neutropenic period  Description: INTERVENTIONS:  - Monitor WBC    Outcome: Adequate for Discharge     Problem: Ineffective Coping  Goal: Cooperates with admission process  Description: Interventions:   - Complete admission process  Outcome: Adequate for Discharge  Goal: Identifies ineffective coping skills  Outcome: Adequate for Discharge  Goal: Identifies healthy coping skills  Outcome: Adequate for Discharge  Goal: Demonstrates healthy coping skills  Outcome: Adequate for Discharge  Goal: Participates in unit activities  Description: Interventions:  - Provide therapeutic environment   - Provide required programming   - Redirect inappropriate behaviors   Outcome: Adequate for Discharge  Goal: Patient/Family participate in treatment and DC plans  Description: Interventions:  - Provide therapeutic environment  Outcome: Adequate for Discharge  Goal: Patient/Family verbalizes awareness of resources  Outcome: Adequate for Discharge  Goal: Understands least restrictive measures  Description: Interventions:  - Utilize least restrictive behavior  Outcome: Adequate for Discharge  Goal: Free from restraint events  Description: - Utilize least restrictive measures   - Provide behavioral interventions   - Redirect inappropriate behaviors   Outcome: Adequate for Discharge     Problem: Risk for Self Injury/Neglect  Goal: Treatment Goal: Remain safe during length of stay, learn and adopt new coping skills, and be free of self-injurious ideation, impulses and acts at the time of discharge  Outcome: Adequate for Discharge  Goal: Verbalize thoughts and feelings  Description: Interventions:  - Assess and re-assess patient's lethality and potential for self-injury  - Engage patient in 1:1 interactions, daily, for a minimum of 15 minutes  - Encourage patient to express feelings, fears, frustrations, hopes  - Establish rapport/trust with patient   Outcome: Adequate for Discharge  Goal: Refrain from harming self  Description: Interventions:  - Monitor patient closely, per order  - Develop a trusting relationship  - Supervise medication ingestion, monitor effects and side effects   Outcome: Adequate for Discharge  Goal: Attend and participate in unit activities, including therapeutic, recreational, and educational groups  Description: Interventions:  - Provide therapeutic and educational activities daily, encourage attendance and participation, and document same in the medical record  - Obtain collateral information, encourage visitation and family involvement in care   Outcome: Adequate for Discharge  Goal: Recognize maladaptive responses and adopt new coping mechanisms  Outcome: Adequate for Discharge  Goal: Complete daily ADLs, including personal hygiene independently, as able  Description: Interventions:  - Observe, teach, and assist patient with ADLS  - Monitor and promote a balance of rest/activity, with adequate nutrition and elimination  Outcome: Adequate for Discharge     Problem: Depression  Goal: Treatment Goal: Demonstrate behavioral control of depressive symptoms, verbalize feelings of improved mood/affect, and adopt new coping skills prior to discharge  Outcome: Adequate for Discharge  Goal: Verbalize thoughts and feelings  Description: Interventions:  - Assess and re-assess patient's level of risk   - Engage patient in 1:1 interactions, daily, for a minimum of 15 minutes   - Encourage patient to express feelings, fears, frustrations, hopes   Outcome: Adequate for Discharge  Goal: Refrain from harming self  Description: Interventions:  - Monitor patient closely, per order   - Supervise medication ingestion, monitor effects and side effects   Outcome: Adequate for Discharge  Goal: Refrain from isolation  Description: Interventions:  - Develop a trusting relationship   - Encourage socialization   Outcome: Adequate for Discharge  Goal: Refrain from self-neglect  Outcome: Adequate for Discharge  Goal: Attend and participate in unit activities, including therapeutic, recreational, and educational groups  Description: Interventions:  - Provide therapeutic and educational activities daily, encourage attendance and participation, and document same in the medical record   Outcome: Adequate for Discharge  Goal: Complete daily ADLs, including personal hygiene independently, as able  Description: Interventions:  - Observe, teach, and assist patient with ADLS  -  Monitor and promote a balance of rest/activity, with adequate nutrition and elimination   Outcome: Adequate for Discharge     Problem: Anxiety  Goal: Anxiety is at manageable level  Description: Interventions:  - Assess and monitor patient's anxiety level  - Monitor for signs and symptoms (heart palpitations, chest pain, shortness of breath, headaches, nausea, feeling jumpy, restlessness, irritable, apprehensive)  - Collaborate with interdisciplinary team and initiate plan and interventions as ordered    - Dows patient to unit/surroundings  - Explain treatment plan  - Encourage participation in care  - Encourage verbalization of concerns/fears  - Identify coping mechanisms  - Assist in developing anxiety-reducing skills  - Administer/offer alternative therapies  - Limit or eliminate stimulants  Outcome: Adequate for Discharge     Problem: DISCHARGE PLANNING  Goal: Discharge to home or other facility with appropriate resources  Description: INTERVENTIONS:  - Identify barriers to discharge w/patient and caregiver  - Arrange for needed discharge resources and transportation as appropriate  - Identify discharge learning needs (meds, wound care, etc )  - Arrange for interpretive services to assist at discharge as needed  - Refer to Case Management Department for coordinating discharge planning if the patient needs post-hospital services based on physician/advanced practitioner order or complex needs related to functional status, cognitive ability, or social support system  Outcome: Adequate for Discharge

## 2022-10-26 NOTE — DISCHARGE SUMMARY
Discharge Summary - Kaykay Reid 33 y o  male MRN: [id number]  Unit/Bed#: [facility] Encounter: [id number]     Admission Date: 10/15/2022         Discharge Date: 10/26/2022    Attending Psychiatrist: Alfred Allen MD    Reason for Admission/HPI:     According to H&P completed by Dr Roney Gale on 10/16/22: On evaluation in the inpatient psychiatric unit Howie Troncoso states that he is remorseful that he acted on suicidal thoughts and cut his wrist   He states that he struggles with complex PTSD, which he describes as “the issue will never get resolved”  He states that he was feeling very upset and impulsively cut his wrists with a knife before immediately seeking assistance for his injury  He states he also had taken half a bottle of Benadryl because “I wanted to numb myself”  He denies current suicidal or homicidal ideation, and reiterates that he is remorseful for his suicide attempt  He states that he has a good relationship with his parents and siblings and does not want to leave them  When I asked him why he attempted to elope from the emergency department, he admitted that in the past, he had a poor experience when he was involuntarily committed 4 years ago  He states at that time, he was experiencing extreme stress related to his trauma and was having suicidal thoughts  He was started on Zyprexa, which he states caused weight gain and made him feel “like a zombie”  He states that he has been hesitant to seek psychiatric treatment since then  He states that he had been doing well for the past several months, but things have gotten worse at work  He states he has talked to HR about “harassment” and did not find a solution  He states he will not go into details about his trauma as “I just met you”  He states that he has looked into treatment with EMDR and feels that it might be helpful for him    He states that he had been feeling depressed, and experiencing anxiety that was causing him to have difficulty sleeping at night  He denies any symptoms of huong, stating that he does sleep well most nights except when he is experiencing anxiety  He states he has also been having nightmares almost every night and experiencing crying spells  Social History     Tobacco History     Smoking Status  Never Smoker    Smokeless Tobacco Use  Never Used          Alcohol History     Alcohol Use Status  Not Asked          Drug Use     Drug Use Status  Yes Types  Marijuana          Sexual Activity     Sexually Active  Not Asked          Activities of Daily Living    Not Asked                   Past Medical History:   Diagnosis Date   • PTSD (post-traumatic stress disorder)      History reviewed  No pertinent surgical history  Medications: All current active medications have been reviewed  Medications prior to admission:    Cannot display prior to admission medications because the patient has not been admitted in this contact  Allergies:     No Known Allergies    Objective     Vital signs in last 24 hours:    Temp:  [97 8 °F (36 6 °C)-98 2 °F (36 8 °C)] 98 2 °F (36 8 °C)  HR:  [71-88] 71  Resp:  [16-18] 18  BP: (131-142)/(67-71) 135/67      Intake/Output Summary (Last 24 hours) at 10/26/2022 1159  Last data filed at 10/26/2022 0801  Gross per 24 hour   Intake 1800 ml   Output --   Net 1800 ml       Hospital Course:     Saurabh Kong was admitted to the inpatient psychiatric unit and started on Behavioral Health checks every 7 minutes  During the hospitalization he was encouraged to attend individual therapy, group therapy, milieu therapy and occupational therapy  Psychiatric medications were started during the hospital stay  To address psychotic symptoms, paranoid ideation, delusional thoughts and disorganized thinking process, Saurabh Kong was treated with antipsychotic medication Geodon  Medication doses were added and titrated to Geodon 40mg PO BID during the hospital course    Prior to beginning of treatment medications risks and benefits and possible side effects including risk of parkinsonian symptoms, Tardive Dyskinesia and metabolic syndrome related to treatment with antipsychotic medications were reviewed with Alli Garza  He verbalized understanding and agreement for treatment  Upon admission Alli Garza was seen by medical service for medical clearance for inpatient treatment and medical follow up  Bill symptoms persisted over the hospital course  Initially after admission he was still feeling delusional, paranoid, grandiose and psychotic  With adjustment of medications and therapeutic milieu his symptoms did not improve  He was still significantly psychotic exhibiting paranoid ideation, grandiose delusions, and poor insight and judgement  Alli Garza denied suicidal ideation, intent or plan at the time of discharge and denied homicidal ideation, intent or plan at the time of discharge  Alli Garza still had psychotic symptoms and poor reality testing at the tme of transfer  Once his quarantine was completed, Alli Garza was transferred to  for ongoing medication adjustments, milieu therapy, and to continue with developing a safe discharge plan        Mental Status at Time of Discharge:     Appearance:  disheveled, looks stated age   Behavior:  guarded, uncooperative, poor eye contact, evasive, sarcastic, superficial   Speech:  normal rate, normal volume, normal pitch   Mood:  anxious, irritable   Affect:  constricted   Thought Process:  disorganized, illogical   Associations: circumstantial associations   Thought Content:  grandiose and paranoid delusions   Perceptual Disturbances: no auditory hallucinations, no visual hallucinations, denies when asked, appears distracted, appears preoccupied, does not appear responding to internal stimuli   Risk Potential: Suicidal ideation - None at present, contracts for safety on the unit, would talk to staff if not feeling safe on the unit  Homicidal ideation - None at present  Potential for aggression - Not at present   Sensorium:  oriented to person, place and time/date   Memory:  recent memory intact   Consciousness:  alert and awake   Attention/Concentration: attention span and concentration appear shorter than expected for age   Insight:  poor   Judgment: poor   Gait/Station: normal gait/station, normal balance   Motor Activity: no abnormal movements     Admission Diagnosis:    Principal Problem:    Paranoia (psychosis) ([facility] )  Active Problems:    Wrist laceration    PTSD (post-traumatic stress disorder)    COVID    Medical clearance for psychiatric admission    Major depression with psychotic features ([facility] )    Urinary hesitancy      Discharge Diagnosis:     Principal Problem:    [facility]anoia (psychosis) ([facility] )  Active Problems:    Wrist laceration    PTSD (post-traumatic stress disorder)    COVID    Medical clearance for psychiatric admission    Major depression with psychotic features ([facility] )    Urinary hesitancy  Resolved Problems:    Overdose with Benadryl; with anticholinergic toxidrome    Leukocytosis      Lab Results:   I have personally reviewed all pertinent laboratory/tests results    Most Recent Labs:   Lab Results   Component Value Date    WBC 5 73 10/17/2022    RBC 4 04 10/17/2022    HGB 12 6 10/17/2022    HCT 38 4 10/17/2022     10/17/2022    RDW 12 2 10/17/2022    NEUTROABS 2 89 10/17/2022    SODIUM 137 10/18/2022    K 4 0 10/18/2022     10/18/2022    CO2 29 10/18/2022    BUN 7 10/18/2022    CREATININE 0 70 10/18/2022    GLUC 100 (H) 10/18/2022    CALCIUM 9 1 10/18/2022    AST 37 10/18/2022    ALT 44 10/18/2022    ALKPHOS 44 10/18/2022    TP 7 2 10/18/2022    ALB 4 4 10/18/2022    TBILI 0 77 10/18/2022    CHOLESTEROL 161 10/15/2022    HDL 34 (L) 10/15/2022    TRIG 104 10/15/2022    LDLCALC 106 10/15/2022    [place] 127 10/15/2022    [id number] 3 290 10/15/2022    RPR Non-Reactive 10/15/2022       Discharge Medications:    See after visit summary for all reconciled discharge medications provided to patient and family  Discharge instructions/Information to patient and family:     See after visit summary for information provided to patient and family  Provisions for Follow-Up Care:    See after visit summary for information related to follow-up care and any pertinent home health orders  Discharge Statement:    I spent 15 minutes discharging the patient  This time was spent on the day of discharge  I had direct contact with the patient on the day of discharge       Additional documentation is required if more than 30 minutes were spent on discharge:    Kendra Singletary is being transferred to  for ongoing psychiatric stabilization    Discharge on Two Antipsychotic Medications: GEE Mcrae 10/26/22

## 2022-10-26 NOTE — NURSING NOTE
Patient is visible intermittently, irritable edge, pacing in the hallway, and making numerous phone calls  PRN Haldol given at 1232  Patient is medication compliant, and cooperative with assessment questions  Patient currently denies SI, AH, VH  Patient reports anxiety 2/4, and 0/10 for depression  No c/o offers on that shift, bp 151/74, 100% meals intake  Will continue to monitor

## 2022-10-26 NOTE — PLAN OF CARE
Problem: Alteration in Thoughts and Perception  Goal: Treatment Goal: Gain control of psychotic behaviors/thinking, reduce/eliminate presenting symptoms and demonstrate improved reality functioning upon discharge  Outcome: Not Progressing  Goal: Refrain from acting on delusional thinking/internal stimuli  Description: Interventions:  - Monitor patient closely, per order   - Utilize least restrictive measures   - Set reasonable limits, give positive feedback for acceptable   - Administer medications as ordered and monitor of potential side effects  Outcome: Not Progressing  Goal: Agree to be compliant with medication regime, as prescribed and report medication side effects  Description: Interventions:  - Offer appropriate PRN medication and supervise ingestion; conduct AIMS, as needed   Outcome: Not Progressing     Problem: Depression  Goal: Treatment Goal: Demonstrate behavioral control of depressive symptoms, verbalize feelings of improved mood/affect, and adopt new coping skills prior to discharge  Outcome: Not Progressing  Goal: Verbalize thoughts and feelings  Description: Interventions:  - Assess and re-assess patient's level of risk   - Engage patient in 1:1 interactions, daily, for a minimum of 15 minutes   - Encourage patient to express feelings, fears, frustrations, hopes   Outcome: Not Progressing  Goal: Refrain from harming self  Description: Interventions:  - Monitor patient closely, per order   - Supervise medication ingestion, monitor effects and side effects   Outcome: Not Progressing  Goal: Refrain from isolation  Description: Interventions:  - Develop a trusting relationship   - Encourage socialization   Outcome: Not Progressing  Goal: Attend and participate in unit activities, including therapeutic, recreational, and educational groups  Description: Interventions:  - Provide therapeutic and educational activities daily, encourage attendance and participation, and document same in the medical record   Outcome: Not Progressing     Problem: SELF HARM/SUICIDALITY  Goal: Will have no self-injury during hospital stay  Description: INTERVENTIONS:  - Q 15 MINUTES: Routine safety checks  - Q WAKING SHIFT & PRN: Assess risk to determine if routine checks are adequate to maintain patient safety  - Encourage patient to participate actively in care by formulating a plan to combat response to suicidal ideation, identify supports and resources  Outcome: Not Progressing     Problem: DEPRESSION  Goal: Will be euthymic at discharge  Description: INTERVENTIONS:  - Administer medication as ordered  - Provide emotional support via 1:1 interaction with staff  - Encourage involvement in milieu/groups/activities  - Monitor for social isolation  Outcome: Not Progressing     Problem: PSYCHOSIS  Goal: Will report no hallucinations or delusions  Description: Interventions:  - Administer medication as  ordered  - Every waking shifts and PRN assess for the presence of hallucinations and or delusions  - Assist with reality testing to support increasing orientation  - Assess if patient's hallucinations or delusions are encouraging self-harm or harm to others and intervene as appropriate  Outcome: Not Progressing     Problem: INVOLUNTARY ADMIT  Goal: Will cooperate with staff recommendations and doctor's orders and will demonstrate appropriate behavior  Description: INTERVENTIONS:  - Treat underlying conditions and offer medication as ordered  - Educate regarding involuntary admission procedures and rules  - Utilize positive consistent limit setting strategies to support patient and staff safety  Outcome: Not Progressing

## 2022-10-27 RX ADMIN — PSYLLIUM HUSK 1 PACKET: 3.4 POWDER ORAL at 08:19

## 2022-10-27 RX ADMIN — ZIPRASIDONE HYDROCHLORIDE 40 MG: 40 CAPSULE ORAL at 16:56

## 2022-10-27 RX ADMIN — IBUPROFEN 400 MG: 400 TABLET ORAL at 04:05

## 2022-10-27 RX ADMIN — ZIPRASIDONE HYDROCHLORIDE 40 MG: 40 CAPSULE ORAL at 08:18

## 2022-10-27 RX ADMIN — NICOTINE 14 MG: 14 PATCH, EXTENDED RELEASE TRANSDERMAL at 08:18

## 2022-10-27 NOTE — ASSESSMENT & PLAN NOTE
• Patient is medically cleared for admission to [facility] and treatment of underlying psychiatric illness  • No acute medical needs  Medicine will sign off  Please call with additional questions or concerns

## 2022-10-27 NOTE — ASSESSMENT & PLAN NOTE
· Sutures in right wrist were removed today  · Sutures in left wrist were also removed however horizontal sutures remain in place given difficulty at removing  Will re-attempt removal in 24-48 hrs

## 2022-10-27 NOTE — PROGRESS NOTES
10/27/22 0900   Team Meeting   Meeting Type Daily Rounds   Team Members Present   Team Members Present Physician;Nurse;   Physician Team Member Michi   [facility] [place]   Patient/Family Present   Patient Present No   Patient's Family Present No   Readmit score 24  Pt new 302 admission due to SA via OD benadryl and cutting wrists  Pt was on 6T due to Husam

## 2022-10-27 NOTE — NURSING NOTE
Pt isolative to room mostly  Denies SI/HI/AVH  pt cooperative and pleasant during assessment  Wrist wounds assessed and 4x4 CDD applied with paper tape   Continued q7m checks for safety

## 2022-10-27 NOTE — TREATMENT PLAN
TREATMENT PLAN REVIEW - [address] 33 y o  1990 male MRN: [id number]    [address] [address] Room / Bed: [place] [address]/[place] [address] Encounter: [id number]          Admit Date/Time:  10/26/2022  3:25 PM    Treatment Team: Attending Provider: Kaylan Lew MD; Consulting Physician: Mer Delatorre MD; Advanced Practitioner: Aysha Muhammad PA-C; Registered Nurse: Madeleine Howell RN; Patient Care Technician: Na Spencer; Patient Care Assistant: Dany Hickey; : Sarah Gonzalez; Patient Care Assistant: Camila Keane; Patient Care Assistant: Mattie Lopez; Patient Care Technician: Shane Neal;  Licensed Practical Nurse: Anand Lawrence LPN    Diagnosis: Principal Problem:    Major depression with psychotic features ([facility] [address] )  Active Problems:    Paranoia (psychosis) ([facility] [address] )    Wrist laceration    PTSD (post-traumatic stress disorder)    Medical clearance for psychiatric admission      Patient Strengths/Assets: average or above intelligence, general fund of knowledge, says he has a supportive family    Patient Barriers/Limitations: difficulty adapting, lack of stable employment, limited motivation, low self esteem, noncompliant with medication, noncompliant with treatment, patient is on an involuntary commitment, patient is unwilling to work on problems, poor insight, poor interpersonal skills, poor past treatment response, poor reasoning ability, poor self-care, self-care deficit, uncooperative    Short Term Goals: decrease in anxiety symptoms, decrease in paranoid thoughts, decrease in psychotic symptoms, ability to stay safe on the unit, ability to stay free of restraints, improvement in ability to express basic needs, improvement in insight, improvement in reality testing, improvement in reasoning ability, improvement in self care, increase in group attendance, increase in socialization with peers on the unit, acceptance of need for psychiatric treatment, acceptance of psychiatric medications    Long Term Goals: improvement in anxiety, free of suicidal thoughts, free of homicidal thoughts, no self abusive behavior, resolution of psychotic symptoms, improvement in reality testing, improvement in reasoning ability, improved insight, able to express basic needs, acceptance of need for psychiatric medications, acceptance of need for psychiatric treatment, acceptance of need for psychiatric follow up after discharge, acceptance of psychiatric diagnosis, adequate self care, appropriate interaction with peers, appropriate interaction with family, stable living arrangements upon discharge, establishment of family support upon discharge    Progress Towards Goals: little to no progress thus far    Recommended Treatment: medication management, patient medication education, group therapy, milieu therapy, continued Behavioral Health psychiatric evaluation/assessment process    Treatment Frequency: daily medication monitoring, group and milieu therapy daily, monitoring through interdisciplinary rounds, monitoring through weekly patient care conferences    Expected Discharge Date:  11/5/22    Discharge Plan: attend aftercare/continuing care group, discharge to family care, discharge to home, referral for outpatient medication management with a psychiatrist, referral for outpatient psychotherapy, return to previous living arrangement    Treatment Plan Created/Updated By: Mariposa Menendez MD

## 2022-10-27 NOTE — NURSING NOTE
Patient reported 4/10 arm pain, and relived ibuprofen       5189 patient sleeping which indicate no pain

## 2022-10-27 NOTE — SOCIAL WORK
JOHAN attempted to meet with Pt in his room  Pt handed SW a slip of paper with the words "silent protest" written on it  When JOHAN asked if Pt would be willing to answer questions for assessment, Pt shook his head no and gestured at paper  JOHAN advised SW will come back later to complete assessment

## 2022-10-27 NOTE — PLAN OF CARE
Problem: Alteration in Thoughts and Perception  Goal: Treatment Goal: Gain control of psychotic behaviors/thinking, reduce/eliminate presenting symptoms and demonstrate improved reality functioning upon discharge  Outcome: Progressing  Goal: Refrain from acting on delusional thinking/internal stimuli  Description: Interventions:  - Monitor patient closely, per order   - Utilize least restrictive measures   - Set reasonable limits, give positive feedback for acceptable   - Administer medications as ordered and monitor of potential side effects  Outcome: Progressing  Goal: Agree to be compliant with medication regime, as prescribed and report medication side effects  Description: Interventions:  - Offer appropriate PRN medication and supervise ingestion; conduct AIMS, as needed   Outcome: Progressing     Problem: Depression  Goal: Treatment Goal: Demonstrate behavioral control of depressive symptoms, verbalize feelings of improved mood/affect, and adopt new coping skills prior to discharge  Outcome: Progressing  Goal: Verbalize thoughts and feelings  Description: Interventions:  - Assess and re-assess patient's level of risk   - Engage patient in 1:1 interactions, daily, for a minimum of 15 minutes   - Encourage patient to express feelings, fears, frustrations, hopes   Outcome: Progressing  Goal: Refrain from harming self  Description: Interventions:  - Monitor patient closely, per order   - Supervise medication ingestion, monitor effects and side effects   Outcome: Progressing  Goal: Refrain from isolation  Description: Interventions:  - Develop a trusting relationship   - Encourage socialization   Outcome: Progressing  Goal: Attend and participate in unit activities, including therapeutic, recreational, and educational groups  Description: Interventions:  - Provide therapeutic and educational activities daily, encourage attendance and participation, and document same in the medical record   Outcome: Progressing Problem: SELF HARM/SUICIDALITY  Goal: Will have no self-injury during hospital stay  Description: INTERVENTIONS:  - Q 15 MINUTES: Routine safety checks  - Q WAKING SHIFT & PRN: Assess risk to determine if routine checks are adequate to maintain patient safety  - Encourage patient to participate actively in care by formulating a plan to combat response to suicidal ideation, identify supports and resources  Outcome: Progressing     Problem: DEPRESSION  Goal: Will be euthymic at discharge  Description: INTERVENTIONS:  - Administer medication as ordered  - Provide emotional support via 1:1 interaction with staff  - Encourage involvement in milieu/groups/activities  - Monitor for social isolation  Outcome: Progressing     Problem: PSYCHOSIS  Goal: Will report no hallucinations or delusions  Description: Interventions:  - Administer medication as  ordered  - Every waking shifts and PRN assess for the presence of hallucinations and or delusions  - Assist with reality testing to support increasing orientation  - Assess if patient's hallucinations or delusions are encouraging self-harm or harm to others and intervene as appropriate  Outcome: Progressing     Problem: INVOLUNTARY ADMIT  Goal: Will cooperate with staff recommendations and doctor's orders and will demonstrate appropriate behavior  Description: INTERVENTIONS:  - Treat underlying conditions and offer medication as ordered  - Educate regarding involuntary admission procedures and rules  - Utilize positive consistent limit setting strategies to support patient and staff safety  Outcome: Progressing     Problem: Ineffective Coping  Goal: Participates in unit activities  Description: Interventions:  - Provide therapeutic environment   - Provide required programming   - Redirect inappropriate behaviors   Outcome: Progressing     Problem: DISCHARGE PLANNING  Goal: Discharge to home or other facility with appropriate resources  Description: INTERVENTIONS:  - Identify barriers to discharge w/patient and caregiver  - Arrange for needed discharge resources and transportation as appropriate  - Identify discharge learning needs (meds, wound care, etc )  - Arrange for interpretive services to assist at discharge as needed  - Refer to Case Management Department for coordinating discharge planning if the patient needs post-hospital services based on physician/advanced practitioner order or complex needs related to functional status, cognitive ability, or social support system  Outcome: Progressing

## 2022-10-27 NOTE — NURSING NOTE
Patient compliant with morning dose of Geodon as well as mouth checks  Appears paranoid  Bizarre during interaction  Laughing inappropriately at times  States that he is "fasting for personal reasons"  Later in the shift, patient would not speak with writer would only answer by shaking his head and laughing

## 2022-10-27 NOTE — PROGRESS NOTES
Met with Herber Amado to complete his self admission paperwork  He passed this writer a piece of paper stating "Silent treatment"  He refused to talk and went back to his room

## 2022-10-27 NOTE — SOCIAL WORK
Patient Intake   Living Arrangement Pt lives with his family   Can patient return home Yes   Address to discharge to [facility]/ [address], [place], [address]   Patient's Telephone Number [phone]   Patient's e-mail Address None   Access to firearms Denies   Type of work Unemployed - applying for disability    School grade/year Bachelor's degree - International Management   Marital Status/Children Single, no children   Transportation Public Transport - [address]   Admission Status    Status of admission [address]   Patient History   Stressor/Trigger "I was getting a lot of anonymous threats"   Treatment History 4 years ago - "[facility]where in [facility]"   Current psychiatrist/therapist None - would like referral to [facility]   Suicide Attempts Prior to admission - cut wrists   Family History of Mental Health Pt reports belief that Father has PTSD, Brother - Depression and anxiety, Sister - Anxiety, Paternal Family - Alcoholism   ACT/ICM None, accepted referral   Legal Issues Denies   Substance Abuse UDS: +THC  Audit Score: 5   Trauma/Losses Pt endorses a trauma history but declines to answer further questions relating to trauma          Releases of [address] [place]

## 2022-10-27 NOTE — NURSING NOTE
PT ordered Geodon 60mg , refused the said dose  Writer reached out to provider Allen Grande MD)who agreed to give 40mg which PT was perilously on  Does given and mouth check done

## 2022-10-27 NOTE — H&P
Psychiatric Evaluation - Behavioral Health     Identification Data:Lai Faust 33 y o  male MRN: [id number]  Unit/Bed#: U 348-01 Encounter: [id number]    Chief Complaint: suicide attempt by overdose and suicidal gesture    History of Present Illness :    Per Catracho Duty, D O  on     "Sheng Padilla is a 33 y o  male with a history of depression and PTSD who was admitted to the inpatient older adult psychiatric unit on a voluntary 201 commitment basis due to depression and suicidal behavior  Per crisis: The patient is a 36 admission status 33years old transfer from [facility]  The patient has a Hx of PTSD and was recently admitted to [facility] after a suicidal attempt where the patient cut his wrists and overdosed on Benadryl pills  The patient eloped from the transport to the [facility] unit on [facility]  Later appears with his mother on SHED  The patient was calm and cooperative on approach at the moment of the admission  Denies SI, AH, and VH  The patient presented a bilateral wrist wrap-up from the past suicidal attempt  The patient denies pain or discomfort  As patient had eloped and 302 had  in that amount of time since he was initially seen in the emergency department, I discussed with the patient and the treatment team possibility of changing his commitment to a voluntary admission  Patient was in agreement that he needed help and voluntary commitment was signed      On evaluation in the inpatient psychiatric unit Juan Urbina states that he is remorseful that he acted on suicidal thoughts and cut his wrist   He states that he struggles with complex PTSD, which he describes as “the issue will never get resolved”  He states that he was feeling very upset and impulsively cut his wrists with a knife before immediately seeking assistance for his injury  He states he also had taken half a bottle of Benadryl because “I wanted to numb myself”    He denies current suicidal or homicidal ideation, and reiterates that he is remorseful for his suicide attempt  He states that he has a good relationship with his parents and siblings and does not want to leave them "   When I asked him why he attempted to elope from the emergency department, he admitted that in the past, he had a poor experience when he was involuntarily committed 4 years ago  He states at that time, he was experiencing extreme stress related to his trauma and was having suicidal thoughts  He was started on Zyprexa, which he states caused weight gain and made him feel “like a zombie”  He states that he has been hesitant to seek psychiatric treatment since then  He states that he had been doing well for the past several months, but things have gotten worse at work  He states he has talked to HR about “harassment” and did not find a solution  He states he will not go into details about his trauma as “I just met you”  He states that he has looked into treatment with EMDR and feels that it might be helpful for him  He states that he had been feeling depressed, and experiencing anxiety that was causing him to have difficulty sleeping at night  He denies any symptoms of huong, stating that he does sleep well most nights except when he is experiencing anxiety  He states he has also been having nightmares almost every night and experiencing crying spells       Symptoms prior to admission included worsening depression, hopelessness, helplessness, paranoid ideation, delusional thoughts, anxiety symptoms, flashbacks and nightmares  Onset of symptoms was gradual starting several weeks ago with slowly worsening course since that time  Stressors preceding admission included financial problems, occupational problems, job loss, job stress, social difficulties and ongoing anxiety  Lai tested positive for COVID in the ED  He was therefore hospitalized on COVID unit on 6 T  I saw this patient while he was on the [facility] unit    Rea Mart was initially extremely reluctant to accept any kind of medication, citing a trial of Zyprexa in the past (and subsequent weight gain) as his reason  I let him know that although he was on 8 a 201 voluntary admission, this could quickly be converted to a 302 if he continued to resist treatment  We agreed on a low dose of Geodon, 20 mg p o  B i d      The morning after starting the Carrizo Hillmahad Allen admitted that his nightmares have gone away with 1 dose of Geodon 20 mg  He agreed to continue taking Geodon 20 mg p o  B i d   When it came time for Deon to be transferred to the 3rd floor, he resisted, making it clear that he believed he was ready to go home  He became increasingly paranoid and argumentative with the attending physician on 3 T  He continued to insist on being discharged  Neither I nor Dr Prince Gisell grullon thought that it was safe to discharge this patient, so we instituted instituted a to   [id number] involuntary petition  Dr Prince Jameson had increased his Geodon to 40 mg p o  B i d  Prior to his coming to the 3rd floor  Increase to 60 mg p o  B i d  upon arrival to the floor  Pt is now on meds over objection    On initial evaluation after admission to the inpatient psychiatric unit Deon  was irritated   He decided to take a path of passive resistance, and is currently on a silent protest   In addition,  he has decided to fast, taking only tea as sustenance  In reviewing criteria for personality disorders, 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  Criteria for does:  A a pervasive distrust and suspiciousness of others such that there motives are interpreted as malevolent, beginning by early adulthood and present present in a wide variety of contexts, as indicated by 4 (or more of the followin   suspects, without sufficient basis, that others are exploiting, harming or deceiving him or her   2   is preoccupied with unjustified doubts about the loyalty or trustworthiness of friends or extend or associates  3  is reluctant to confide in others because of unwarranted fear that the information well be used maliciously against him or her  4  reads demeaning or threatening meanings into benign remarks or events  5   persistently bears grudges (IE is un- forgiving of insults, injuries or slights )  6  perceives attacks on his or her character or reputation that are not apparent to others and is quick to react angrily or to counter attack  7   has recurrent suspicions, without justification cup, regarding fidelity of spouse or sexual partner  Anish Saucedo     Psychiatric Review Of Systems:    Sleep changes: no  Appetite changes: no  Weight changes: no  Energy/anergy: no  Interest/pleasure/anhedonia: unknown  Somatic symptoms: yes, he believes that bones might be broken in his dorsal forearms  Anxiety/panic: yes  Vannessa: no  Guilty/hopeless: no  Self injurious behavior/risky behavior: yes, he deeply cut both wrists  Suicidal ideation: no  Homicidal ideation: no  Auditory hallucinations: no  Visual hallucinations: no  Other hallucinations: no  Delusional thinking: yes, Believes that there is a probability but believes that there is a possibility that his the bones in his forearms might be broken  Eating disorder history: unknown  Obsessive/compulsive symptoms: obsessive thoughts    Historical Information     Past Psychiatric History:     Past Inpatient Psychiatric Treatment:   Several past inpatient psychiatric admissions  Past Outpatient Psychiatric Treatment:    Not in outpatient treatment recently  Past Suicide Attempts: history of self abusive behavior by cutting wrists  Past Violent Behavior:  Unknown  Past Psychiatric Medication Trials: Zyprexa     Substance Abuse History:    Social History     Tobacco History     Smoking Status  Current Every Day Smoker Smoking Frequency  1 pack/day for 10 years (10 pk yrs)    Smokeless Tobacco Use  Never Used          Alcohol History     Alcohol Use Status  Not Currently Drinks/Week  0 Glasses of wine, 0 Cans of beer, 0 Shots of liquor, 0 Standard drinks or equivalent per week          Drug Use     Drug Use Status  Not Currently Types  Marijuana Comment  occasional marijuana use          Sexual Activity     Sexually Active  Not Asked          Activities of Daily Living    Not Asked               Additional Substance Use Detail     Questions Responses    Problems Due to Past Use of Alcohol? No    Problems Due to Past Use of Substances?  No    Substance Use Assessment Unable to assess or patient unwilling to answer    Alcohol Use Frequency Denies use in past 12 months    Cannabis frequency Never used    Comment:  Never used on 10/26/2022     Heroin Frequency Denies use in past 12 months    Cocaine frequency Never used    Comment:  Never used on 10/26/2022     Crack Cocaine Frequency Denies use in past 12 months    Methamphetamine Frequency Denies use in past 12 months    Narcotic Frequency Denies use in past 12 months    Benzodiazepine Frequency Denies use in past 12 months    Amphetamine frequency Denies use in past 12 months    Barbituate Frequency Denies use use in past 12 months    Inhalant frequency Never used    Comment:  Never used on 10/26/2022     Hallucinogen frequency Never used    Comment:  Never used on 10/26/2022     Ecstasy frequency Never used    Comment:  Never used on 10/26/2022     Other drug frequency Never used    Comment:  Never used on 10/26/2022     Opiate frequency Denies use in past 12 months    Last reviewed by July Curry RN on 10/26/2022        I have assessed this patient for substance use within the past 12 months    Alcohol use: denies use  Recreational drug use:   Cocaine:  denies use  Heroin:  denies use  Marijuana:  uses occasionally  Other drugs: denies use   Longest clean time: unknown  History of Inpatient/Outpatient rehabilitation program: no  Smoking history: denies current use  Use of caffeine: Drinks tea    Family Psychiatric History:     Psychiatric Illness:  unknown  Substance Abuse:  unknown  Suicide Attempts:  unknown    Social History:    Education: college graduate  Learning Disabilities: none  Marital History: single  Children: none  Living Arrangement: lives in home with Family  Occupational History: currently unemployed  Functioning Relationships: Reports that his family is supportive but refuses to sign a release of information so that we may talk to them  Legal History: unknown   History: None    Traumatic History:     Abuse: not willing to provide details but insists he has severe, complex trauma  Other Traumatic Events: Unable to determine     Past Medical History:    History of Seizures: no  History of Head injury with loss of consciousness: no    Past Medical History:   Diagnosis Date   • PTSD (post-traumatic stress disorder)    • Suicide attempt ([facility] [address] )      No past surgical history on file  Medical Review Of Systems:    Patient has pain in both of his forearms, believing that there are possible fractures  Allergies: Allergies   Allergen Reactions   • Shellfish-Derived Products - Food Allergy Nausea Only     Nausea and itching of mouth       Medications: All current active medications have been reviewed      OBJECTIVE:    Vital signs in last 24 hours:    Temp:  [97 5 °F (36 4 °C)-98 5 °F (36 9 °C)] 98 5 °F (36 9 °C)  HR:  [59-73] 72  Resp:  [16] 16  BP: (122-146)/(70-87) 146/73    No intake or output data in the 24 hours ending 10/27/22 1305     Mental Status Evaluation:    Appearance:  disheveled, dressed in hospital attire, Poor hygiene   Behavior:  guarded, evasive, inappropriate, does not want to talk   Speech:  does not want to talk   Mood:  His mood is difficult to assess because he will not speak   Affect:  normal range and intensitybut incongruent   Language: naming objects and repeating phrases   Thought Process:  unable to assess   Associations: intact associations   Thought Content:  paranoid ideation   Perceptual Disturbances: denies auditory hallucinations when asked   Risk Potential: Suicidal ideation - None at present  Homicidal ideation - None at present  Potential for aggression - Not at present   Sensorium:  oriented to person, place and situation   Memory:  patient does not answer   Consciousness:  alert and awake   Attention/Concentration: attention span and concentration are age appropriate   Intellect: within normal limits   Fund of Knowledge: vocabulary: normal   Insight:  poor   Judgment: poor   Muscle Strength:   Muscle Tone: normal  normal   Gait/Station: normal gait/station   Motor Activity: no abnormal movements       Laboratory Results: I have personally reviewed all pertinent laboratory/tests results    Most Recent Labs:   Lab Results   Component Value Date    WBC 5 73 10/17/2022    RBC 4 04 10/17/2022    HGB 12 6 10/17/2022    HCT 38 4 10/17/2022     10/17/2022    RDW 12 2 10/17/2022    NEUTROABS 2 89 10/17/2022    SODIUM 137 10/18/2022    K 4 0 10/18/2022     10/18/2022    CO2 29 10/18/2022    BUN 7 10/18/2022    CREATININE 0 70 10/18/2022    GLUC 100 (H) 10/18/2022    CALCIUM 9 1 10/18/2022    AST 37 10/18/2022    ALT 44 10/18/2022    ALKPHOS 44 10/18/2022    TP 7 2 10/18/2022    ALB 4 4 10/18/2022    TBILI 0 77 10/18/2022    CHOLESTEROL 161 10/15/2022    HDL 34 (L) 10/15/2022    TRIG 104 10/15/2022    [address] 106 10/15/2022    [place] 127 10/15/2022    [id number] 3 290 10/15/2022    RPR Non-Reactive 10/15/2022       Imaging Studies: CT head without contrast    Result Date: 10/12/2022  Narrative: CT BRAIN - WITHOUT CONTRAST INDICATION:   Delirium AMS  COMPARISON:  None  TECHNIQUE:  CT examination of the brain was performed  In addition to axial images, sagittal and coronal 2D reformatted images were created and submitted for interpretation  Radiation dose length product (DLP) for this visit:  965 mGy-cm     This examination, like all CT scans performed in the [facility], was performed utilizing techniques to minimize radiation dose exposure, including the use of iterative reconstruction and automated exposure control  IMAGE QUALITY:  Diagnostic  FINDINGS: PARENCHYMA:  No intracranial mass, mass effect or midline shift  No CT signs of acute infarction  No acute parenchymal hemorrhage  VENTRICLES AND EXTRA-AXIAL SPACES:  Normal for the patient's age  VISUALIZED ORBITS AND PARANASAL SINUSES:  Unremarkable  CALVARIUM AND EXTRACRANIAL SOFT TISSUES:  Normal      Impression: No acute intracranial abnormality  Workstation performed: [id number]     CT abdomen pelvis with contrast    Result Date: 10/12/2022  Narrative: CT ABDOMEN AND PELVIS WITH IV CONTRAST INDICATION:   Abdominal pain, acute, nonlocalized AMS, Apparent abdominal tenderness  COMPARISON:  None  TECHNIQUE:  CT examination of the abdomen and pelvis was performed  Axial, sagittal, and coronal 2D reformatted images were created from the source data and submitted for interpretation  Radiation dose length product (DLP) for this visit:  700 mGy-cm   This examination, like all CT scans performed in the [facility], was performed utilizing techniques to minimize radiation dose exposure, including the use of iterative reconstruction and automated exposure control  IV Contrast:  100 mL of iohexol (OMNIPAQUE) Enteric Contrast:  Enteric contrast was not administered  FINDINGS: ABDOMEN LOWER CHEST:  No clinically significant abnormality identified in the visualized lower chest  LIVER/BILIARY TREE:  Unremarkable  GALLBLADDER:  No calcified gallstones  No pericholecystic inflammatory change  SPLEEN:  Unremarkable  PANCREAS:  Unremarkable  ADRENAL GLANDS:  Unremarkable  KIDNEYS/URETERS:  Unremarkable  No hydronephrosis  STOMACH AND BOWEL:  Unremarkable  APPENDIX:  No findings to suggest appendicitis  ABDOMINOPELVIC CAVITY:  No ascites  No pneumoperitoneum  No lymphadenopathy   VESSELS:  Unremarkable for patient's age  PELVIS REPRODUCTIVE ORGANS:  Unremarkable for patient's age  URINARY BLADDER:  Bladder is markedly distended, but otherwise unremarkable  ABDOMINAL WALL/INGUINAL REGIONS:  Unremarkable  OSSEOUS STRUCTURES:  No acute fracture or destructive osseous lesion  Impression: No acute pathology  Marked distention of the urinary bladder noted  Workstation performed: [id number]       Code Status: Level 1 - Full Code  Advance Directive and Living Will: <no information>    Suicide/Homicide Risk Assessment:    Risk of Harm to Self:   Based on today's assessment, Robert Jackson presents the following risk of harm to self: low    Risk of Harm to Others:  Based on today's assessment, Robert Jackson presents the following risk of harm to others: low    The following interventions are recommended: behavioral checks every 7 minutes, continued hospitalization on locked unit     Assessment/Plan   Principal Problem:    Paranoia (psychosis) ([facility] [address] )      Patient Strengths: average or above intelligence, family ties, general fund of knowledge, supportive family     Patient Barriers: difficulty adapting, financial instability, immature, lack of financial means, lack of stable employment, limited motivation, low self esteem, noncompliant with medication, noncompliant with treatment, patient is on an involuntary commitment, patient is unwilling to work on problems, poor insight    Treatment Plan: See treatment plan    Planned Treatment and Medication Changes: All current active medications have been reviewed  Encourage group therapy, milieu therapy and occupational therapy  Behavioral Health checks every 7 minutes  Patient is currently prescribed Geodon 60 mg p o  B i d    He has begun to be noncompliant and is now on a medications over objections status    Current Facility-Administered Medications   Medication Dose Route Frequency Provider Last Rate   • acetaminophen  650 mg Oral Q6H PRN Miriam Barker MD     • aluminum-magnesium hydroxide-simethicone  30 mL Oral Q4H PRN Yuly Horta MD     • artificial tear  1 application Both Eyes D9J PRN Yuly Horta MD     • haloperidol lactate  2 5 mg Intramuscular Q6H PRN Max 4/day Yuly Horta MD      And   • LORazepam  1 mg Intramuscular Q6H PRN Max 4/day Yuly Horta MD      And   • benztropine  0 5 mg Intramuscular Q6H PRN Max 4/day Yuly Horta MD     • haloperidol lactate  5 mg Intramuscular Q4H PRN Max 4/day Yuly Horta MD      And   • LORazepam  2 mg Intramuscular Q4H PRN Max 4/day Yuly Horta MD      And   • benztropine  1 mg Intramuscular Q4H PRN Max 4/day Yuly Horta MD     • benztropine  1 mg Intramuscular Q4H PRN Max 6/day Yuly Horta MD     • benztropine  1 mg Oral Q4H PRN Max 6/day Yuly Horta MD     • hydrOXYzine HCL  50 mg Oral Q6H PRN Max 4/day Yuly Horta MD      Or   • diphenhydrAMINE  50 mg Intramuscular Q6H PRN Yuly Horta MD     • haloperidol  2 mg Oral Q4H PRN Max 6/day Yuly Horta MD     • haloperidol  5 mg Oral Q6H PRN Max 4/day Yuly Horta MD     • haloperidol  5 mg Oral Q4H PRN Max 4/day Yuly Horta MD     • hydrOXYzine HCL  100 mg Oral Q6H PRN Max 4/day Yuly Horta MD      Or   • LORazepam  2 mg Intramuscular Q6H PRN Yuly Horta MD     • hydrOXYzine HCL  25 mg Oral Q6H PRN Max 4/day Yuly Horta MD     • ibuprofen  200 mg Oral Q6H PRN Yuly Horta MD     • ibuprofen  400 mg Oral Q6H PRN Yuly Horta MD     • ibuprofen  600 mg Oral Q8H PRN Yuly Horta MD     • melatonin  3 mg Oral HS PRN Yuly Horta MD     • nicotine  14 mg Transdermal Daily Yuly Horta MD     • OLANZapine  10 mg Oral Q3H PRN Max 3/day Yuly Horta MD      Or   • OLANZapine  10 mg Intramuscular Q3H PRN Max 3/day Yuly Horta MD     • OLANZapine  5 mg Oral Q3H PRN Max 6/day Yuly Horta MD      Or   • OLANZapine  5 mg Intramuscular Q3H PRN Max 6/day Yuly Horta MD     • OLANZapine  2 5 mg Oral Q3H PRN Max 8/day Yuly Horta MD     • polyethylene glycol  17 g Oral Daily PRN Yuly Horta MD     • propranolol  10 mg Oral Q8H PRN Reilly Bernabe MD     • psyllium  1 packet Oral Daily Reilly Bernabe MD     • senna-docusate sodium  1 tablet Oral Daily PRN Reilly Bernabe MD     • ziprasidone  60 mg Oral BID With Meals Leanne Izaguirre MD       Risks / Benefits of Treatment:    Risks, benefits, and possible side effects of medications explained to patient and patient verbalizes understanding and agreement for treatment  Counseling / Coordination of Care:    Patient's presentation on admission and proposed treatment plan discussed with treatment team   Diagnosis, medication changes and treatment plan reviewed with patient      Inpatient Psychiatric Certification:    Estimated length of stay: 10 midnights    Based upon physical, mental and social evaluations, I certify that inpatient psychiatric services are medically necessary for this patient for a duration of 10 midnights for the treatment of Paranoia (psychosis) ([facility]      Leanne Izaguirre MD 10/27/22

## 2022-10-27 NOTE — CONSULTS
[address] 1990, 33 y o  male MRN: [id number]  Unit/Bed#: [facility] 348-01 Encounter: [id number]  Primary Care Provider: No primary care provider on file  Date and time admitted to hospital: 10/26/2022  3:25 PM    Inpatient consult for Medical Clearance for [address] patient  Consult performed by: Bladimir Stevenson PA-C  Consult ordered by: Sanford Nael MD        Medical clearance for psychiatric admission  Assessment & Plan  • Patient is medically cleared for admission to [facility] and treatment of underlying psychiatric illness  • No acute medical needs  Medicine will sign off  Please call with additional questions or concerns  * Major depression with psychotic features [facility])  Assessment & Plan  · Treatment per psychiatry  Wrist laceration  Assessment & Plan  · Sutures in right wrist were removed today  · Sutures in left wrist were also removed however horizontal sutures remain in place given difficulty at removing  Will re-attempt removal in 24-48 hrs  ECT Clearance:  • History of recent seizure or stroke:  No  • History of pheochromocytoma:  No  • History of active bleeding (Intracranial hemorrhage, aneurysm or AVM):  No  • History of metallic implants in the head or neck:  No  • History of increased intracranial pressure with mass effect:  No  ·   EKG within 3 months?  o If yes, was an arrhythmia present and at baseline?  o If yes, what is the baseline QT interval?  o If no, obtain prior to ECT for arrhythmia evaluation and baseline QT interval     Based on above criteria, Patient is medically cleared for ECT should it be recommended  Counseling / Coordination of Care Time: 30 minutes  Greater than 50% of total time spent on patient counseling and coordination of care  Collaboration of Care:  Were Recommendations Directly Discussed with Primary Treatment Team? - Yes     History of Present Illness:    Malissa Parson is a 33 y o  male who is originally admitted to the psychiatry service due to paranoia, suicidal attempt with Benadryl overdose and wrist slitting  We are consulted for medical clearance for admission to [facility] and treatment of underlying psychiatric illness  Patient's PMH includes PTSD, recent COVID infection  Currently, the patient denies any complaints  His sutures are due to be removed from his wrist lacerations  He is agreeable to allow me to remove the sutures today  Review of Systems:    Review of Systems   Constitutional: Negative for appetite change, chills, diaphoresis, fatigue, fever and unexpected weight change  HENT: Negative for congestion and sore throat  Eyes: Negative for visual disturbance  Respiratory: Negative for cough, chest tightness, shortness of breath and wheezing  Cardiovascular: Negative for chest pain, palpitations and leg swelling  Gastrointestinal: Negative for abdominal distention, abdominal pain, blood in stool, constipation, diarrhea, nausea and vomiting  Genitourinary: Negative for difficulty urinating, dysuria, frequency, hematuria and urgency  Musculoskeletal: Negative for back pain, gait problem and neck pain  Skin: Negative for rash  Neurological: Negative for dizziness, tremors, speech difficulty, weakness, light-headedness, numbness and headaches  Psychiatric/Behavioral: Negative for confusion  The patient is not nervous/anxious  All other systems reviewed and are negative  Past Medical and Surgical History:     Past Medical History:   Diagnosis Date   • PTSD (post-traumatic stress disorder)    • Suicide attempt ([facility] [address] )        No past surgical history on file  Meds/Allergies:    PTA meds:   Prior to Admission Medications   Prescriptions Last Dose Informant Patient Reported? Taking?   nicotine (NICODERM CQ) 14 mg/24hr TD 24 hr patch   No No   Sig: Place 1 patch on the skin daily Do not start before October 27, 2022     psyllium (METAMUCIL) packet No No   Sig: Take 1 packet by mouth daily Do not start before October 27, 2022  ziprasidone (GEODON) 40 mg capsule   No No   Sig: Take 1 capsule (40 mg total) by mouth 2 (two) times a day with meals      Facility-Administered Medications: None       Allergies: Allergies   Allergen Reactions   • Shellfish-Derived Products - Food Allergy Nausea Only     Nausea and itching of mouth       Social History:     Marital Status: Single    Substance Use History:   Social History     Substance and Sexual Activity   Alcohol Use Not Currently     Social History     Tobacco Use   Smoking Status Current Every Day Smoker   • Packs/day: 1 00   • Years: 10 00   • Pack years: 10 00   Smokeless Tobacco Never Used     Social History     Substance and Sexual Activity   Drug Use Not Currently   • Types: Marijuana    Comment: occasional marijuana use       Family History:    Family History   Problem Relation Age of Onset   • Alcohol abuse Father    • Depression Father        Physical Exam:     Vitals:   Blood Pressure: 146/73 (10/27/22 1159)  Pulse: 72 (10/27/22 1159)  Temperature: 98 5 °F (36 9 °C) (10/27/22 1159)  Temp Source: Temporal (10/27/22 1159)  Respirations: 16 (10/27/22 1159)  Height: 6' 3" (190 5 cm) (10/26/22 1546)  Weight - Scale: 89 6 kg (197 lb 9 6 oz) (10/26/22 1546)  SpO2: 100 % (10/27/22 1159)    Physical Exam  Vitals reviewed  HENT:      Head: Normocephalic and atraumatic  Eyes:      General: No scleral icterus  Cardiovascular:      Rate and Rhythm: Normal rate and regular rhythm  Heart sounds: No murmur heard  Pulmonary:      Breath sounds: Normal breath sounds  No wheezing or rales  Chest:      Chest wall: No tenderness  Abdominal:      General: Bowel sounds are normal  There is no distension  Palpations: Abdomen is soft  Tenderness: There is no abdominal tenderness  Musculoskeletal:         General: Normal range of motion        Comments: Bilateral anterior wrist lacerations are well approximated and healing nicely  Sutures were removed  Horizontal suture in left wrist was difficult to remove and patient asked to stop further attempts at this time  Skin:     General: Skin is warm and dry  Findings: No rash  Additional Data:     Lab Results: I have personally reviewed pertinent reports  EKG, Pathology, and Other Studies Reviewed on Admission:   · EKG (10/18/22): NSR  Normal QT interval     ** Please Note: This note has been constructed using a voice recognition system   **

## 2022-10-28 LAB
ATRIAL RATE: 75 BPM
P AXIS: -7 DEGREES
PR INTERVAL: 138 MS
QRS AXIS: 37 DEGREES
QRSD INTERVAL: 78 MS
QT INTERVAL: 376 MS
QTC INTERVAL: 419 MS
T WAVE AXIS: 50 DEGREES
VENTRICULAR RATE: 75 BPM

## 2022-10-28 RX ORDER — WATER 1000 ML/1000ML
INJECTION, SOLUTION INTRAVENOUS
Status: COMPLETED
Start: 2022-10-28 | End: 2022-10-28

## 2022-10-28 RX ORDER — ZIPRASIDONE MESYLATE 20 MG/ML
10 INJECTION, POWDER, LYOPHILIZED, FOR SOLUTION INTRAMUSCULAR 2 TIMES DAILY WITH MEALS
Status: DISCONTINUED | OUTPATIENT
Start: 2022-10-28 | End: 2022-10-31

## 2022-10-28 RX ORDER — ZIPRASIDONE MESYLATE 20 MG/ML
10 INJECTION, POWDER, LYOPHILIZED, FOR SOLUTION INTRAMUSCULAR 2 TIMES DAILY WITH MEALS
Status: DISCONTINUED | OUTPATIENT
Start: 2022-10-28 | End: 2022-10-28

## 2022-10-28 RX ADMIN — MELATONIN TAB 3 MG 3 MG: 3 TAB at 01:11

## 2022-10-28 RX ADMIN — ZIPRASIDONE HYDROCHLORIDE 60 MG: 40 CAPSULE ORAL at 08:19

## 2022-10-28 RX ADMIN — NICOTINE 14 MG: 14 PATCH, EXTENDED RELEASE TRANSDERMAL at 08:17

## 2022-10-28 RX ADMIN — ZIPRASIDONE MESYLATE 10 MG: 20 INJECTION, POWDER, LYOPHILIZED, FOR SOLUTION INTRAMUSCULAR at 17:25

## 2022-10-28 RX ADMIN — WATER 1.2 ML: 1 INJECTION INTRAMUSCULAR; INTRAVENOUS; SUBCUTANEOUS at 17:25

## 2022-10-28 NOTE — NURSING NOTE
Patient refused to take scheduled dose of Geodon 60 mg po  " I will only take 40 mg"  Patient then went on to say that he didn't even know that he was taking 40 mg and thought he was taking 20 mg  " I now only want 20 mg"  This information was given to psychiatrist during treatment team  Patient then would not speak to writer and would only shake his head "yes or no"  Seclusive to room  Will continue to monitor

## 2022-10-28 NOTE — NURSING NOTE
PT observed isolative in his room, cooperative and pleasant with writer, had a proper verbal communication, expressed mistrust with treatment team, refusal of Geodon 60mg PO order, Provider made aware, who went to spoke with PT concerning the order, unfortunately PT continues to refuse the PO order, Provider then informed PT of receiving IM Geodon 10ml or PO  PT accepted IM Geodon 10ml without any difficulty and no security personal present  PT refused dinner meal only taking fluid, writer instead offered him with sealed cereal, other snack and cheese which he happily took  Denies SI/HI/VH/AH, observed paranoid and delusional in the course interaction  Encouraged to speak with Provider and other team members as that will be helpful with his treatment plan  Will continue to monitor at this time

## 2022-10-28 NOTE — NURSING NOTE
PT continue to presented self as dumb, not speaking occasionally will say one or two words  Refused full meal but only had fluids for dinner stated "I am on fast"  PT denies SI/HI/VH/AH by shaking his head to assessment questions  No outburst behavior issues on this shift

## 2022-10-28 NOTE — SOCIAL WORK
Pt's 303 hearing held this morning with [facility]  Prior to hearing, pt requested to speak with his   Pt was given the opportunity to speak with  prior to hearing  Pt was in attendance and not in agreement with 303 petition  [id number] petition was upheld for 20 days  303 as of 10/28/2022, expires 11/17

## 2022-10-28 NOTE — PROGRESS NOTES
10/28/22 [address] [place] [address] Admission Notification   Notification of Admission Provided to: Family Alvarez Speak - [phone])   Family Notified via: Phone call   SW left voicemail for Pt's mom to advise of transfer to 3rd floor   SW requested return phone call for collateral

## 2022-10-28 NOTE — PROGRESS NOTES
Progress Note - Kaykay Reid 33 y o  male MRN: [id number]  Unit/Bed#: [facility] 348-01 Encounter: [id number]    Assessment/Plan   Principal Problem:    Major depression with psychotic features ([facility] [address] )  Active Problems:    Paranoia (psychosis) ([facility] [address] )    Wrist laceration    PTSD (post-traumatic stress disorder)    Medical clearance for psychiatric admission      Recommended Treatment/PLAN:  1  Geodon 60 mg  PO BID  2  If pt refuses PO Geodon,give Geodon IM 10 mg  IN BID  Continue with pharmacotherapy, group therapy, milieu therapy and occupational therapy  Continue to assess for adverse medication side effects  Encourage Kerri Tolliver to participate in nonverbal forms of therapy including journaling and art/music therapy  Continue frequent safety checks and vitals per unit protocol  Continue to engage CM/SW to assist with collateral, disposition planning, and the implementation of an individualized, patient-centered plan of care  Continue medical management by medical team   Case discussed with treatment team     Legal Status: 303  ------------------------------------------------------------    Subjective: All documentation including nursing notes, medication history to ensure medication adherence on the unit, labs, and vitals were reviewed  Lai's 303 hearing was held this AM and was upheld for up to 20 days  Ronel Arroyo continues to refuse PO Geodon at 60 mg  PO BID, believing that we are trying to trick him and poison him  He was given a choice of PO Geodon or 10 mg  IM Geodon and he told me to go ahead with the IM  He thinks he is building , He continues paranoid and delusional  He interprets everything going on around him in a malignant way  He is not in acute distress, but he certainly IS angry  He tries very hard not to speak to us, preferring to hand us notes and letters   Over the past 24 hours per nursing report, Ronel Arroyo has been selectivelycooperative on the unit and selectivelycompliant with medications  Today, Manny Call is consenting for safety on the unit  Manny Call reports feeling "gaslighted " Manny Call notes having good sleep  Manny Call states having a good appetite  Manny Call has not been  taking the medications as prescribed  Patient was seen and examined today both prior to the hearing and afterwards, in his room     We discussed the need for him to be cooperative and take the medications as prescribed  Manny Call denies suicidal ideations  Manny Call denies homicidal ideations  Regarding hallucinations, Manny Call denies both auditory and visual hallucinations  PRNs overnight: none  VS: Reviewed, within normal limits    Progress Toward Goals: no improvement    Psychiatric Review of Systems:  Behavior over the last 24 hours:  unchanged  Sleep: normal  Appetite: normal  Medication side effects: unable to assess   ROS: no complaints    Vital signs in last 24 hours:  Temp:  [97 2 °F (36 2 °C)-97 6 °F (36 4 °C)] 97 6 °F (36 4 °C)  HR:  [80-82] 80  Resp:  [16] 16  BP: (137-159)/(78) 159/78    Laboratory results:  I have personally reviewed all pertinent laboratory/tests results    Recent Results (from the past 48 hour(s))   ECG 12 lead    Collection Time: 10/28/22 10:39 AM   Result Value Ref Range    Ventricular Rate 75 BPM    Atrial Rate 75 BPM    KY Interval 138 ms    QRSD Interval 78 ms    QT Interval 376 ms    QTC Interval 419 ms    P Axis -7 degrees    QRS Axis 37 degrees    T Wave Axis 50 degrees         Mental Status Evaluation:    Appearance:  disheveled, dressed in hospital attire   Behavior:  angry, uncooperative   Speech:  normal rate and volume, does not want to talk   Mood:  Will not answer   Affect:  inappropriate laugh   Thought Process:  illogical, perseverative   Associations: intact associations   Thought Content:  persecutory and paranoid delusions   Perceptual Disturbances: Denies auditory or visual hallucinations and Does not appear to be responding to internal stimuli   Risk Potential: Suicidal ideation - None  Homicidal ideation - None  Potential for aggression - Not at present   Sensorium:  oriented to person, place and situation   Memory:  recent and remote memory grossly intact   Consciousness:  alert and awake   Attention/Concentration: attention span and concentration are age appropriate   Insight:  poor   Judgment: poor   Gait/Station: normal gait/station   Motor Activity: no abnormal movements       Current Medications:  Current Facility-Administered Medications   Medication Dose Route Frequency Provider Last Rate   • acetaminophen  650 mg Oral Q6H PRN Chiki Ho MD     • aluminum-magnesium hydroxide-simethicone  30 mL Oral Q4H PRN Chiki Ho MD     • artificial tear  1 application Both Eyes J2O PRN Chiki Ho MD     • haloperidol lactate  2 5 mg Intramuscular Q6H PRN Max 4/day Chiki Ho MD      And   • LORazepam  1 mg Intramuscular Q6H PRN Max 4/day Chiki Ho MD      And   • benztropine  0 5 mg Intramuscular Q6H PRN Max 4/day Chiki Ho MD     • haloperidol lactate  5 mg Intramuscular Q4H PRN Max 4/day Chiki Ho MD      And   • LORazepam  2 mg Intramuscular Q4H PRN Max 4/day Chiki Ho MD      And   • benztropine  1 mg Intramuscular Q4H PRN Max 4/day Chiki Ho MD     • benztropine  1 mg Intramuscular Q4H PRN Max 6/day Chiki Ho MD     • benztropine  1 mg Oral Q4H PRN Max 6/day Chiki Ho MD     • hydrOXYzine HCL  50 mg Oral Q6H PRN Max 4/day Chiki Ho MD      Or   • diphenhydrAMINE  50 mg Intramuscular Q6H PRN Chiki Ho MD     • haloperidol  2 mg Oral Q4H PRN Max 6/day Chiki Ho MD     • haloperidol  5 mg Oral Q6H PRN Max 4/day Chiki Ho MD     • haloperidol  5 mg Oral Q4H PRN Max 4/day Chiki Ho MD     • hydrOXYzine HCL  100 mg Oral Q6H PRN Max 4/day Chiki Ho MD      Or   • LORazepam  2 mg Intramuscular Q6H PRN Chiki Ho MD     • hydrOXYzine HCL  25 mg Oral Q6H PRN Max 4/day Chiki Ho MD     • ibuprofen  200 mg Oral Q6H PRN Jeannette Jovel MD     • ibuprofen  400 mg Oral Q6H PRN Jeannette Jovel MD     • ibuprofen  600 mg Oral Q8H PRN Jeannette Jovel MD     • melatonin  3 mg Oral HS PRN Jeannette Jovel MD     • nicotine  14 mg Transdermal Daily Jeannette Jovel MD     • OLANZapine  10 mg Oral Q3H PRN Max 3/day Jeannette Jovel MD      Or   • OLANZapine  10 mg Intramuscular Q3H PRN Max 3/day Jeannette Jovel MD     • OLANZapine  5 mg Oral Q3H PRN Max 6/day Jeannette Jovel MD      Or   • OLANZapine  5 mg Intramuscular Q3H PRN Max 6/day Jeannette Jovel MD     • OLANZapine  2 5 mg Oral Q3H PRN Max 8/day Jeannette Jovel MD     • polyethylene glycol  17 g Oral Daily PRN Jeannette Jovel MD     • propranolol  10 mg Oral Q8H PRN Jeannette Jovel MD     • psyllium  1 packet Oral Daily Jeannette Jovel MD     • senna-docusate sodium  1 tablet Oral Daily PRN Jeannette Joevl MD     • ziprasidone  60 mg Oral BID With Meals Adamaris Gaona MD      Or   • ziprasidone  10 mg Intramuscular BID With Meals Adamaris Gaona MD         Suicide/Homicide Risk Assessment:  Risk of Harm to Self:   Based on today's assessment, Juan Urbina presents the following risk of harm to self: low    Risk of Harm to Others:  Based on today's assessment, Juan Ciara presents the following risk of harm to others: low    The following interventions are recommended: behavioral checks every 7 minutes, continued hospitalization on locked unit    Behavioral Health Medications: All current active meds have been reviewed  Changes as in plan section above  Risks, benefits and possible side effects of Medications:   Risks, benefits, and possible side effects of medications explained to patient and patient verbalizes understanding  Counseling / Coordination of Care:  Patient's progress discussed with staff in treatment team meeting  Medications, treatment progress and treatment plan reviewed with patient  Rosendo Robledo 10/28/22      This note was completed in part utilizing Step-[employer] Direct Software   Grammatical, translation, syntax errors, random word insertions, spelling mistakes, and incomplete sentences may be an occasional consequence of this system secondary to software limitations with voice recognition, ambient noise, and hardware issues  If you have any questions or concerns about the content, text, or information contained within the body of this dictation, please contact the provider for clarification

## 2022-10-28 NOTE — PROGRESS NOTES
10/28/22 0826   Team Meeting   Meeting Type Daily Rounds   Team Members Present   Team Members Present Physician;Nurse;   Physician Team Member [address] [facility] Luis Alberto   Patient/Family Present   Patient Present No   Patient's Family Present No   Pt's 303 hearing this morning  Refusing Geodon d/t "I did not agree to this"  Pt remains paranoid and guarded  Pt reporting "silent protest" discharge to be determined

## 2022-10-28 NOTE — PROGRESS NOTES
10/28/22 1528   Team Meeting   Meeting Type Tx Team Meeting   Initial Conference Date 10/28/22   Team Members Present   Team Members Present Physician;Nurse;   Physician Team Member [address] [facility] Luis Alberto   Patient/Family Present   Patient Present Yes   Patient's Family Present No   Tx plan was reviewed and discussed with Pt  Pt was encouraged to attend groups  Medication was discussed with Pt  Pt refused to sign tx plan

## 2022-10-29 RX ORDER — DESVENLAFAXINE 50 MG/1
50 TABLET, EXTENDED RELEASE ORAL DAILY
Status: DISCONTINUED | OUTPATIENT
Start: 2022-10-29 | End: 2022-10-31

## 2022-10-29 RX ADMIN — NICOTINE 14 MG: 14 PATCH, EXTENDED RELEASE TRANSDERMAL at 08:28

## 2022-10-29 RX ADMIN — ZIPRASIDONE HYDROCHLORIDE 60 MG: 40 CAPSULE ORAL at 08:25

## 2022-10-29 RX ADMIN — PSYLLIUM HUSK 1 PACKET: 3.4 POWDER ORAL at 08:23

## 2022-10-29 RX ADMIN — ZIPRASIDONE HYDROCHLORIDE 60 MG: 40 CAPSULE ORAL at 17:43

## 2022-10-29 RX ADMIN — DESVENLAFAXINE 50 MG: 50 TABLET, FILM COATED, EXTENDED RELEASE ORAL at 14:48

## 2022-10-29 NOTE — PLAN OF CARE
Problem: Alteration in Thoughts and Perception  Goal: Agree to be compliant with medication regime, as prescribed and report medication side effects  Description: Interventions:  - Offer appropriate PRN medication and supervise ingestion; conduct AIMS, as needed   Outcome: Progressing     Problem: Depression  Goal: Treatment Goal: Demonstrate behavioral control of depressive symptoms, verbalize feelings of improved mood/affect, and adopt new coping skills prior to discharge  Outcome: Progressing  Goal: Verbalize thoughts and feelings  Description: Interventions:  - Assess and re-assess patient's level of risk   - Engage patient in 1:1 interactions, daily, for a minimum of 15 minutes   - Encourage patient to express feelings, fears, frustrations, hopes   Outcome: Progressing  Goal: Refrain from harming self  Description: Interventions:  - Monitor patient closely, per order   - Supervise medication ingestion, monitor effects and side effects   Outcome: Progressing  Goal: Refrain from isolation  Description: Interventions:  - Develop a trusting relationship   - Encourage socialization   Outcome: Progressing  Goal: Attend and participate in unit activities, including therapeutic, recreational, and educational groups  Description: Interventions:  - Provide therapeutic and educational activities daily, encourage attendance and participation, and document same in the medical record   Outcome: Progressing     Problem: SELF HARM/SUICIDALITY  Goal: Will have no self-injury during hospital stay  Description: INTERVENTIONS:  - Q 15 MINUTES: Routine safety checks  - Q WAKING SHIFT & PRN: Assess risk to determine if routine checks are adequate to maintain patient safety  - Encourage patient to participate actively in care by formulating a plan to combat response to suicidal ideation, identify supports and resources  Outcome: Progressing     Problem: DEPRESSION  Goal: Will be euthymic at discharge  Description: INTERVENTIONS:  - Administer medication as ordered  - Provide emotional support via 1:1 interaction with staff  - Encourage involvement in milieu/groups/activities  - Monitor for social isolation  Outcome: Progressing     Problem: PSYCHOSIS  Goal: Will report no hallucinations or delusions  Description: Interventions:  - Administer medication as  ordered  - Every waking shifts and PRN assess for the presence of hallucinations and or delusions  - Assist with reality testing to support increasing orientation  - Assess if patient's hallucinations or delusions are encouraging self-harm or harm to others and intervene as appropriate  Outcome: Progressing     Problem: INVOLUNTARY ADMIT  Goal: Will cooperate with staff recommendations and doctor's orders and will demonstrate appropriate behavior  Description: INTERVENTIONS:  - Treat underlying conditions and offer medication as ordered  - Educate regarding involuntary admission procedures and rules  - Utilize positive consistent limit setting strategies to support patient and staff safety  Outcome: Progressing     Problem: DISCHARGE PLANNING  Goal: Discharge to home or other facility with appropriate resources  Description: INTERVENTIONS:  - Identify barriers to discharge w/patient and caregiver  - Arrange for needed discharge resources and transportation as appropriate  - Identify discharge learning needs (meds, wound care, etc )  - Arrange for interpretive services to assist at discharge as needed  - Refer to Case Management Department for coordinating discharge planning if the patient needs post-hospital services based on physician/advanced practitioner order or complex needs related to functional status, cognitive ability, or social support system  Outcome: Progressing     Problem: Alteration in Thoughts and Perception  Goal: Treatment Goal: Gain control of psychotic behaviors/thinking, reduce/eliminate presenting symptoms and demonstrate improved reality functioning upon discharge  Outcome: Not Progressing  Goal: Refrain from acting on delusional thinking/internal stimuli  Description: Interventions:  - Monitor patient closely, per order   - Utilize least restrictive measures   - Set reasonable limits, give positive feedback for acceptable   - Administer medications as ordered and monitor of potential side effects  Outcome: Not Progressing     Problem: Ineffective Coping  Goal: Participates in unit activities  Description: Interventions:  - Provide therapeutic environment   - Provide required programming   - Redirect inappropriate behaviors   Outcome: Not Progressing

## 2022-10-29 NOTE — NURSING NOTE
Denies SI/HI/SIB/AVH/Pain but appears paranoid, anxious  States he does not want to eat because "I ate last night, I'm good " Alternatives offered but pt refused  Fluids encouraged  Remains isolative to room  Pleasant in conversation  Offers no complaints  States he is agreeable to medications
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
## 2022-10-29 NOTE — NURSING NOTE
Pt was compliant with AM medications  Has been passing notes to staff in hallway  Appears to only speak while in room and pass notes in hallway  Notes stated "Silent protest" "Hunger strike starts today" Pt declined to speak with this RN regarding notes  Encouraged food and fluids

## 2022-10-30 RX ADMIN — PSYLLIUM HUSK 1 PACKET: 3.4 POWDER ORAL at 08:05

## 2022-10-30 RX ADMIN — NICOTINE 14 MG: 14 PATCH, EXTENDED RELEASE TRANSDERMAL at 08:05

## 2022-10-30 RX ADMIN — DESVENLAFAXINE 50 MG: 50 TABLET, FILM COATED, EXTENDED RELEASE ORAL at 08:06

## 2022-10-30 RX ADMIN — ZIPRASIDONE HYDROCHLORIDE 60 MG: 40 CAPSULE ORAL at 16:08

## 2022-10-30 RX ADMIN — ZIPRASIDONE HYDROCHLORIDE 60 MG: 40 CAPSULE ORAL at 08:05

## 2022-10-30 NOTE — NURSING NOTE
Patient interviewed while doing medication pass  Patient denying AVH/SI/HI  Spoke with patient regarding concerns of how chlamydia will be received via urine specimen  Patient stated to staff "I'm not even in medical and I can see that  You're retarded " for not being able to know off the top of their head how the test is performed  Patient directed he cannot talk to staff like that  Patient agreed, and specimen cup provided  No other behavioral issues to note, medication compliant

## 2022-10-30 NOTE — PROGRESS NOTES
Progress Note - Guillermoreinaldo Norman Reid 33 y o  male MRN: [id number]  Unit/Bed#: [facility] 348-01 Encounter: [id number]    Assessment/Plan   Principal Problem:    Major depression with psychotic features [facility])  Active Problems:    Wrist laceration    PTSD (post-traumatic stress disorder)    Medical clearance for psychiatric admission    Paranoia (psychosis) ([facility] [address] )      Recommended Treatment:   · No medication changes at this time  · Continue Pristiq 50 mg daily to address symptoms of depression and anxiety  · Continue Geodon 60 mg p o  B i d  For paranoia and psychosis  · The patient is medications over objection and if the patient refuses oral Geodon, he will receive Geodon 10 mg IM b i d  · At this time STD testing has been obtained and will follow up with results  · Medical management per  IM  · Continue with group therapy, milieu therapy and occupational therapy  · Continue frequent safety checks and vitals per unit protocol  · Case discussed with treatment team   · Risks, benefits and possible side effects of Medications: Risks, benefits, and possible side effects of medications have been explained to the patient, who verbalizes understanding    ------------------------------------------------------------    Harry Allyn is a 60-year-old male with a significant past psychiatric history of depression and PTSD who was admitted to the [place] [address] inpatient psychiatric unit on 10/26/2022 as a transfer from the Older Adult [address] [place] [address] (where he was admitted on 10/16/2022) on involuntary (currently 303) commitment basis due to symptoms of worsening depression, hopelessness, helplessness, paranoid ideation, delusional thoughts, anxiety symptoms, flashback, and nightmares  Patient remains on the inpatient psychiatric unit undergoing medication management, group therapy, milieu therapy for major depressive disorder with psychotic features      Subjective:     Per nursing report, on the inpatient psychiatric unit Aneesh Davila has been making slow progress  Per nursing report, he continues to remain generally isolative to his room  His oral intake has been improving and he has been eating packaged meals  The patient has remained medication compliant and he has not required medications over objection  Yesterday evening he was noted by nursing staff to be perseverative regarding the STD testing process and was noted to be irritable towards staff, however was able to be redirected  On nursing assessment in the evening he denied SI, HI, AVH  No acute events overnight  Patient was interviewed privately in his room  On assessment, Aneesh Davila continues to remain with paranoia, however he is less guarded and more forthcoming with information  On assessment, Aneesh Davila reports that he slept well last night (sleeping approximately 7-1/2 hours) and reports feeling refreshed today  He reports that he has been eating well and feels comfortable eating packaged meals  He reports that he has been taking his medication without difficulty and has not noticed any medications side effects  Today Aneesh Davila continues to verbalize persecutory delusions that he was poisoned  He states “I was poisoned at [facility] and a little bit here”  He reports that he is now feeling more comfortable in the hospital and less anxious  On assessment today Aneesh Davila reports that he has been spending his time on the inpatient unit reading, writing, and drawing  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  On inspection his artwork expresses multiple paranoid themes  One artwork is a drawing of a large eye with the words "Big Brother" written at the top of the paper  Another artwork is a drawing of an Manpreet (named "[id number]") surrounded by water  Another artwork has the words "champagne" and "communism" written on it  On assessment Aneesh Davila denies SI, HI, AVH      Progress Toward Goals: slow improvement    Psychiatric Review of Systems:  Behavior over the last 24 hours: improved  Sleep: normal and improving  Appetite: adequate, improving  Medication side effects: none verbalized  ROS: Complete review of systems is negative except as noted above  Vital signs in last 24 hours:  Temp:  [97 6 °F (36 4 °C)-97 7 °F (36 5 °C)] 97 6 °F (36 4 °C)  HR:  [65-75] 65  Resp:  [16] 16  BP: (142-145)/(69-72) 142/72    Mental Status Exam:  Appearance:  Patient is a 71-year-old overtly  male with neck length black hair    Alert, appears stated age, adequate grooming and hygiene, dressed casually, fair eye contact, no acute distress   Behavior:  Less guarded and more forthcoming with information   Motor: No abnormal movements, normal gait and balance   Speech:  delayed initiation, normal rate, normal volume and coherent   Mood:  "better"   Affect:  Appears less anxious   Thought Process:  Generally linear and goal directed however continues to remain illogical and perseverative at times   Thought Content: paranoid ideation with persecutory delusions that he was poisoned, paranoia appears to be slowly improving   Perceptual disturbances: denies current hallucinations and does not appear to be responding to internal stimuli at this time   Risk Potential: No active suicidal ideation, No active homicidal ideation   Cognition: oriented to self and situation, memory grossly intact, age-appropriate attention span and concentration and cognition not formally tested   Insight:  Poor   Judgment: Limited     Current Medications:  Current Facility-Administered Medications   Medication Dose Route Frequency Provider Last Rate   • acetaminophen  650 mg Oral Q6H PRN Judson More MD     • aluminum-magnesium hydroxide-simethicone  30 mL Oral Q4H PRN Judson More MD     • artificial tear  1 application Both Eyes O6W PRN Judson More MD     • haloperidol lactate  2 5 mg Intramuscular Q6H PRN Max 4/day Judson More MD      And   • LORazepam  1 mg Intramuscular Q6H PRN Max 4/day Curry Moss MD      And   • benztropine  0 5 mg Intramuscular Q6H PRN Max 4/day Curry Moss MD     • haloperidol lactate  5 mg Intramuscular Q4H PRN Max 4/day Curry Moss MD      And   • LORazepam  2 mg Intramuscular Q4H PRN Max 4/day Curry Moss MD      And   • benztropine  1 mg Intramuscular Q4H PRN Max 4/day Curry Moss MD     • benztropine  1 mg Intramuscular Q4H PRN Max 6/day Curry Moss MD     • benztropine  1 mg Oral Q4H PRN Max 6/day Curry Moss MD     • desvenlafaxine succinate  50 mg Oral Daily Melissa Dunbar MD     • hydrOXYzine HCL  50 mg Oral Q6H PRN Max 4/day Curry Moss MD      Or   • diphenhydrAMINE  50 mg Intramuscular Q6H PRN Curry Moss MD     • haloperidol  2 mg Oral Q4H PRN Max 6/day Curry Moss MD     • haloperidol  5 mg Oral Q6H PRN Max 4/day Curry Moss MD     • haloperidol  5 mg Oral Q4H PRN Max 4/day Curry Moss MD     • hydrOXYzine HCL  100 mg Oral Q6H PRN Max 4/day Curry Moss MD      Or   • LORazepam  2 mg Intramuscular Q6H PRN Curry Moss MD     • hydrOXYzine HCL  25 mg Oral Q6H PRN Max 4/day Curry Moss MD     • ibuprofen  200 mg Oral Q6H PRN Curry Moss MD     • ibuprofen  400 mg Oral Q6H PRN Curry Moss MD     • ibuprofen  600 mg Oral Q8H PRN Curry Moss MD     • melatonin  3 mg Oral HS PRN Curry Moss MD     • nicotine  14 mg Transdermal Daily Curry Moss MD     • OLANZapine  10 mg Oral Q3H PRN Max 3/day Curry Moss MD      Or   • OLANZapine  10 mg Intramuscular Q3H PRN Max 3/day Curry Moss MD     • OLANZapine  5 mg Oral Q3H PRN Max 6/day Curry Moss MD      Or   • OLANZapine  5 mg Intramuscular Q3H PRN Max 6/day Curry Moss MD     • OLANZapine  2 5 mg Oral Q3H PRN Max 8/day Curry Moss MD     • polyethylene glycol  17 g Oral Daily PRN Curry Moss MD     • propranolol  10 mg Oral Q8H PRN Curry Moss MD     • psyllium  1 packet Oral Daily Curry Moss MD     • senna-docusate sodium  1 tablet Oral Daily PRN Curry Moss MD     • ziprasidone  60 mg Oral BID With Meals Rosendo Robledo MD      Or   • ziprasidone  10 mg Intramuscular BID With Meals Adamaris Gaona MD         Behavioral Health Medications: all current active meds have been reviewed  Changes as in plan section above  Laboratory results:  I have personally reviewed all pertinent laboratory/tests results  No results found for this or any previous visit (from the past 48 hour(s))       Media Levers

## 2022-10-30 NOTE — NURSING NOTE
Robert Jackson denies SI/HI/AH/VH  He reports he "slept like a God" last evening  He is accepting of his scheduled medications and mouth checks performed  Robert Manuel states he eats "normal" food at home but during admission he requests a kosher diet  He is consuming meals  Robert Jackson is mostly withdrawn to his room and offers no complaints at this time  Will continue to monitor

## 2022-10-31 LAB
C TRACH DNA SPEC QL NAA+PROBE: NEGATIVE
N GONORRHOEA DNA SPEC QL NAA+PROBE: NEGATIVE

## 2022-10-31 RX ORDER — HALOPERIDOL 5 MG/ML
5 INJECTION INTRAMUSCULAR 2 TIMES DAILY
Status: DISCONTINUED | OUTPATIENT
Start: 2022-10-31 | End: 2022-11-01

## 2022-10-31 RX ORDER — HALOPERIDOL 2 MG/ML
5 SOLUTION ORAL 2 TIMES DAILY
Status: DISCONTINUED | OUTPATIENT
Start: 2022-10-31 | End: 2022-11-01

## 2022-10-31 RX ADMIN — PSYLLIUM HUSK 1 PACKET: 3.4 POWDER ORAL at 08:55

## 2022-10-31 RX ADMIN — HALOPERIDOL LACTATE 5 MG: 5 INJECTION, SOLUTION INTRAMUSCULAR at 19:08

## 2022-10-31 RX ADMIN — ZIPRASIDONE HYDROCHLORIDE 60 MG: 40 CAPSULE ORAL at 08:55

## 2022-10-31 RX ADMIN — DESVENLAFAXINE 50 MG: 50 TABLET, FILM COATED, EXTENDED RELEASE ORAL at 08:54

## 2022-10-31 RX ADMIN — NICOTINE 14 MG: 14 PATCH, EXTENDED RELEASE TRANSDERMAL at 08:55

## 2022-10-31 NOTE — PROGRESS NOTES
Progress Note - Kaykay Reid 33 y o  male MRN: [id number]  Unit/Bed#: [facility] 348-01 Encounter: [id number]    Assessment/Plan   Principal Problem:    Major depression with psychotic features ([facility] [address] )  Active Problems:    Paranoia (psychosis) ([facility] )    Wrist laceration    PTSD (post-traumatic stress disorder)    Medical clearance for psychiatric admission      Recommended Treatment? PLAN:  1  Discontinue Geodon  2  Haldol oral solution 5 mg  PO BID -- 5 mg  IM for po refusal  3  Discontinue Pristiq   Continue with pharmacotherapy, group therapy, milieu therapy and occupational therapy  Continue to assess for adverse medication side effects  Encourage Angela Juarez to participate in nonverbal forms of therapy including journaling and art/music therapy  Continue frequent safety checks and vitals per unit protocol  Continue to engage CM/SW to assist with collateral, disposition planning, and the implementation of an individualized, patient-centered plan of care  Continue medical management by medical team   Case discussed with treatment team     Legal Status: [id number]  ------------------------------------------------------------    Subjective: All documentation including nursing notes, medication history to ensure medication adherence on the unit, labs, and vitals were reviewed  Marino Ji was evaluated this morning for continuity of care  Marino Ji became incensed when we entered his room to speak with him today  He demanded to know where Dr Holley Hardy was and when we told Azulanisha Batresma that he wasn't here today, Marino Ji raised his voice and demanded that we leave his room immediately      We attempted to discuss changing the medication from Geodon to Haldol oral solution, but Marino Ji demanded that we leave (in a threatening manner ) Over the past 24 hours per nursing report, Marino Ji has been cooperative on the unit (although he stays in his room except for meals -- attending no groups ) He has been taking the medication, but I am still suspicious that he is somehow getting rid of it  Herber Amado refused to answer any questions about anything, including mood, appetite, sleep, side effects or psychotic symptoms  He remains angry and paranoid  Patient was seen today at in his room  Herber Amado refused to answer re: suicidal ideations  Herber Amado refused to answer re:3 homicidal ideations  Regarding hallucinations, Herber Amado refused to answer  PRNs overnight: none   VS: Reviewed, within normal limits    Progress Toward Goals: no improvement    Psychiatric Review of Systems:  Behavior over the last 24 hours:  unchanged  Sleep: refused to answer  Appetite: normal  Medication side effects: refused to answer   ROS: no complaints    Vital signs in last 24 hours:  Temp:  [97 6 °F (36 4 °C)-97 7 °F (36 5 °C)] 97 7 °F (36 5 °C)  HR:  [70-80] 70  Resp:  [16] 16  BP: (127-142)/(70-72) 142/70    Laboratory results:  I have personally reviewed all pertinent laboratory/tests results  No results found for this or any previous visit (from the past 48 hour(s))        Mental Status Evaluation:    Appearance:  disheveled, dressed inappropropriately, poor hygiene, thin & gaunt looking   Behavior:  agitated, angry, bizarre, demanding   Speech:  loud   Mood:  Would not answer   Affect:  reactive   Thought Process:  paranoid   Associations: unable to assess - does not answer   Thought Content:  still very paranoid   Perceptual Disturbances: Denies auditory or visual hallucinations and Does not appear to be responding to internal stimuli   Risk Potential: Suicidal ideation - None, but made a serioius suicide attempt prior to admission  Homicidal ideation - does not answer  Potential for aggression - possible   Sensorium:  does not answer   Memory:  patient does not answer   Consciousness:  alert and awake   Attention/Concentration: attention span and concentration are age appropriate   Insight:  poor   Judgment: poor   Gait/Station: normal gait/station   Motor Activity: no abnormal movements       Current Medications:  Current Facility-Administered Medications   Medication Dose Route Frequency Provider Last Rate   • acetaminophen  650 mg Oral Q6H PRN Bety Saul MD     • aluminum-magnesium hydroxide-simethicone  30 mL Oral Q4H PRN Bety Saul MD     • artificial tear  1 application Both Eyes L2V PRN Bety Saul MD     • haloperidol lactate  2 5 mg Intramuscular Q6H PRN Max 4/day Bety Saul MD      And   • LORazepam  1 mg Intramuscular Q6H PRN Max 4/day Bety Saul MD      And   • benztropine  0 5 mg Intramuscular Q6H PRN Max 4/day Bety Saul MD     • haloperidol lactate  5 mg Intramuscular Q4H PRN Max 4/day Bety Saul MD      And   • LORazepam  2 mg Intramuscular Q4H PRN Max 4/day Bety Saul MD      And   • benztropine  1 mg Intramuscular Q4H PRN Max 4/day Bety Saul MD     • benztropine  1 mg Intramuscular Q4H PRN Max 6/day Bety Saul MD     • benztropine  1 mg Oral Q4H PRN Max 6/day Bety Saul MD     • hydrOXYzine HCL  50 mg Oral Q6H PRN Max 4/day Bety Saul MD      Or   • diphenhydrAMINE  50 mg Intramuscular Q6H PRN Bety Saul MD     • haloperidol  5 mg Oral BID Mariposa Menendez MD      Or   • haloperidol lactate  5 mg Intramuscular BID Mariposa Menendez MD     • haloperidol  2 mg Oral Q4H PRN Max 6/day Bety Saul MD     • haloperidol  5 mg Oral Q6H PRN Max 4/day Bety Saul MD     • haloperidol  5 mg Oral Q4H PRN Max 4/day Bety Saul MD     • hydrOXYzine HCL  100 mg Oral Q6H PRN Max 4/day Bety Saul MD      Or   • LORazepam  2 mg Intramuscular Q6H PRN Bety Saul MD     • hydrOXYzine HCL  25 mg Oral Q6H PRN Max 4/day Bety Saul MD     • ibuprofen  200 mg Oral Q6H PRN Bety Saul MD     • ibuprofen  400 mg Oral Q6H PRN Bety Saul MD     • ibuprofen  600 mg Oral Q8H PRN Bety Saul MD     • melatonin  3 mg Oral HS PRN Bety Saul MD     • nicotine  14 mg Transdermal Daily Bety Saul MD     • OLANZapine  10 mg Oral Q3H PRN Max 3/day Lina Hogan MD      Or   • OLANZapine  10 mg Intramuscular Q3H PRN Max 3/day Lina Hogan MD     • OLANZapine  5 mg Oral Q3H PRN Max 6/day Lina Hogan MD      Or   • OLANZapine  5 mg Intramuscular Q3H PRN Max 6/day Lina Hogan MD     • OLANZapine  2 5 mg Oral Q3H PRN Max 8/day Lina Hogan MD     • polyethylene glycol  17 g Oral Daily PRN Lina Hogan MD     • propranolol  10 mg Oral Q8H PRN Lina Hogan MD     • psyllium  1 packet Oral Daily Lina Hogan MD     • senna-docusate sodium  1 tablet Oral Daily PRN Lina Hogan MD         Suicide/Homicide Risk Assessment:  Risk of Harm to Self:   Based on today's assessment, Manny Call presents the following risk of harm to self: low    Risk of Harm to Others:  Based on today's assessment, Manny Call presents the following risk of harm to others: low    The following interventions are recommended: behavioral checks every 7 minutes, continued hospitalization on locked unit    Behavioral Health Medications: All current active meds have been reviewed  Changes as in plan section above  Risks, benefits and possible side effects of Medications:   Risks, benefits, and possible side effects of medications explained to patient and patient verbalizes understanding  Counseling / Coordination of Care:  Patient's progress discussed with staff in treatment team meeting  Medications, treatment progress and treatment plan reviewed with patient  Guero Hi 10/31/22      This note was completed in part utilizing [employer] Direct Software  Grammatical, translation, syntax errors, random word insertions, spelling mistakes, and incomplete sentences may be an occasional consequence of this system secondary to software limitations with voice recognition, ambient noise, and hardware issues  If you have any questions or concerns about the content, text, or information contained within the body of this dictation, please contact the provider for clarification

## 2022-10-31 NOTE — PLAN OF CARE
Problem: Alteration in Thoughts and Perception  Goal: Treatment Goal: Gain control of psychotic behaviors/thinking, reduce/eliminate presenting symptoms and demonstrate improved reality functioning upon discharge  Outcome: Progressing  Goal: Refrain from acting on delusional thinking/internal stimuli  Description: Interventions:  - Monitor patient closely, per order   - Utilize least restrictive measures   - Set reasonable limits, give positive feedback for acceptable   - Administer medications as ordered and monitor of potential side effects  Outcome: Progressing  Goal: Agree to be compliant with medication regime, as prescribed and report medication side effects  Description: Interventions:  - Offer appropriate PRN medication and supervise ingestion; conduct AIMS, as needed   Outcome: Progressing     Problem: Depression  Goal: Treatment Goal: Demonstrate behavioral control of depressive symptoms, verbalize feelings of improved mood/affect, and adopt new coping skills prior to discharge  Outcome: Progressing  Goal: Verbalize thoughts and feelings  Description: Interventions:  - Assess and re-assess patient's level of risk   - Engage patient in 1:1 interactions, daily, for a minimum of 15 minutes   - Encourage patient to express feelings, fears, frustrations, hopes   Outcome: Progressing  Goal: Refrain from harming self  Description: Interventions:  - Monitor patient closely, per order   - Supervise medication ingestion, monitor effects and side effects   Outcome: Progressing  Goal: Refrain from isolation  Description: Interventions:  - Develop a trusting relationship   - Encourage socialization   Outcome: Progressing  Goal: Attend and participate in unit activities, including therapeutic, recreational, and educational groups  Description: Interventions:  - Provide therapeutic and educational activities daily, encourage attendance and participation, and document same in the medical record   Outcome: Progressing Problem: Ineffective Coping  Goal: Participates in unit activities  Description: Interventions:  - Provide therapeutic environment   - Provide required programming   - Redirect inappropriate behaviors   Outcome: Progressing     Problem: SELF HARM/SUICIDALITY  Goal: Will have no self-injury during hospital stay  Description: INTERVENTIONS:  - Q 15 MINUTES: Routine safety checks  - Q WAKING SHIFT & PRN: Assess risk to determine if routine checks are adequate to maintain patient safety  - Encourage patient to participate actively in care by formulating a plan to combat response to suicidal ideation, identify supports and resources  Outcome: Progressing     Problem: DEPRESSION  Goal: Will be euthymic at discharge  Description: INTERVENTIONS:  - Administer medication as ordered  - Provide emotional support via 1:1 interaction with staff  - Encourage involvement in milieu/groups/activities  - Monitor for social isolation  Outcome: Progressing     Problem: PSYCHOSIS  Goal: Will report no hallucinations or delusions  Description: Interventions:  - Administer medication as  ordered  - Every waking shifts and PRN assess for the presence of hallucinations and or delusions  - Assist with reality testing to support increasing orientation  - Assess if patient's hallucinations or delusions are encouraging self-harm or harm to others and intervene as appropriate  Outcome: Progressing     Problem: INVOLUNTARY ADMIT  Goal: Will cooperate with staff recommendations and doctor's orders and will demonstrate appropriate behavior  Description: INTERVENTIONS:  - Treat underlying conditions and offer medication as ordered  - Educate regarding involuntary admission procedures and rules  - Utilize positive consistent limit setting strategies to support patient and staff safety  Outcome: Progressing     Problem: DISCHARGE PLANNING  Goal: Discharge to home or other facility with appropriate resources  Description: INTERVENTIONS:  - Identify barriers to discharge w/patient and caregiver  - Arrange for needed discharge resources and transportation as appropriate  - Identify discharge learning needs (meds, wound care, etc )  - Arrange for interpretive services to assist at discharge as needed  - Refer to Case Management Department for coordinating discharge planning if the patient needs post-hospital services based on physician/advanced practitioner order or complex needs related to functional status, cognitive ability, or social support system  Outcome: Progressing

## 2022-10-31 NOTE — NURSING NOTE
Pt out of bed for meals with euthymic affect, no overt paranoia  Pt states he is "guarded but not paranoid " Compliant with scheduled PO medication  Denies SI/HI, AVH, depression or anxiety  No additional requests, assessment ongoing

## 2022-10-31 NOTE — NURSING NOTE
Pt refusing PO haldol, asked for IM injection instead  Pt told he can have Cogentin if having EPS  Pt upset with med being changed to haldol  No further agitation, will continue to monitor

## 2022-10-31 NOTE — NURSING NOTE
Patient remained isolative to room throughout the evening  Visible for phone  Denied any unmet needs  No scheduled HS medications and no PRNs needed  Will monitor

## 2022-10-31 NOTE — PROGRESS NOTES
10/31/22 0859   Team Meeting   Meeting Type Daily Rounds   Team Members Present   Team Members Present Physician;Nurse;   Physician Team Member [address] [facility] Team Member Luis Alberto   Patient/Family Present   Patient Present No   Patient's Family Present No   Pt refusing some medications  Stating he is participating in a silent protest and hunger strike  Pt refusing to eat  Pt stating he "slept like a god"  Pt seclusive to his room, requesting kosher diet  Did not receive any PRN medications  Pt communicating with tx team via notes

## 2022-11-01 LAB — HIV 1+2 AB+HIV1 P24 AG SERPL QL IA: NORMAL

## 2022-11-01 RX ORDER — ZIPRASIDONE HYDROCHLORIDE 20 MG/1
20 CAPSULE ORAL 2 TIMES DAILY WITH MEALS
Status: DISCONTINUED | OUTPATIENT
Start: 2022-11-01 | End: 2022-11-03

## 2022-11-01 RX ADMIN — NICOTINE 14 MG: 14 PATCH, EXTENDED RELEASE TRANSDERMAL at 09:49

## 2022-11-01 RX ADMIN — HALOPERIDOL 5 MG: 2 SOLUTION ORAL at 09:50

## 2022-11-01 RX ADMIN — ZIPRASIDONE HYDROCHLORIDE 20 MG: 20 CAPSULE ORAL at 16:45

## 2022-11-01 RX ADMIN — PSYLLIUM HUSK 1 PACKET: 3.4 POWDER ORAL at 09:49

## 2022-11-01 NOTE — NURSING NOTE
The pt was in her rm when this writer went to medicate and assess him  The pt was bizzarre and tangential during assessment  When this writer asked the pt why he was here he said because of this and proceeded to show this writer his bilateral wrist, when this writer asked if he was admitted for attempted suicide the pt stated " No this was a murder"  I asked the pt what he meant, he responded by stating "they took me to the end of the [place], one more step I would have fallen off, so I had to do this", he showed this writer his bilateral  wrist again  The pt said he didn't like taking the Haldol because it makes him feel foggy and he didn't sleep well at night  The pt informed this writer Geodon and pristiq works better, he was informed to make sure he communicates with the psychiatrist when he see him  The pt was complaint with taking his medication, no other issues noted

## 2022-11-01 NOTE — PROGRESS NOTES
11/01/22 0839   Team Meeting   Meeting Type Daily Rounds   Team Members Present   Team Members Present Physician;Nurse;   Physician Team Member [facility]   Nursing Team Member [facility] Management Team Member [place]   Patient/Family Present   Patient Present No   Patient's Family Present No   Meds over objection  Refused PO last night, requesting IM  Pt irritable at times and can escalate

## 2022-11-01 NOTE — PLAN OF CARE
Problem: Alteration in Thoughts and Perception  Goal: Treatment Goal: Gain control of psychotic behaviors/thinking, reduce/eliminate presenting symptoms and demonstrate improved reality functioning upon discharge  Outcome: Progressing  Goal: Refrain from acting on delusional thinking/internal stimuli  Description: Interventions:  - Monitor patient closely, per order   - Utilize least restrictive measures   - Set reasonable limits, give positive feedback for acceptable   - Administer medications as ordered and monitor of potential side effects  Outcome: Progressing  Goal: Agree to be compliant with medication regime, as prescribed and report medication side effects  Description: Interventions:  - Offer appropriate PRN medication and supervise ingestion; conduct AIMS, as needed   Outcome: Progressing     Problem: Depression  Goal: Treatment Goal: Demonstrate behavioral control of depressive symptoms, verbalize feelings of improved mood/affect, and adopt new coping skills prior to discharge  Outcome: Progressing  Goal: Verbalize thoughts and feelings  Description: Interventions:  - Assess and re-assess patient's level of risk   - Engage patient in 1:1 interactions, daily, for a minimum of 15 minutes   - Encourage patient to express feelings, fears, frustrations, hopes   Outcome: Progressing  Goal: Refrain from harming self  Description: Interventions:  - Monitor patient closely, per order   - Supervise medication ingestion, monitor effects and side effects   Outcome: Progressing  Goal: Refrain from isolation  Description: Interventions:  - Develop a trusting relationship   - Encourage socialization   Outcome: Progressing  Goal: Attend and participate in unit activities, including therapeutic, recreational, and educational groups  Description: Interventions:  - Provide therapeutic and educational activities daily, encourage attendance and participation, and document same in the medical record   Outcome: Progressing Problem: Ineffective Coping  Goal: Participates in unit activities  Description: Interventions:  - Provide therapeutic environment   - Provide required programming   - Redirect inappropriate behaviors   Outcome: Progressing     Problem: SELF HARM/SUICIDALITY  Goal: Will have no self-injury during hospital stay  Description: INTERVENTIONS:  - Q 15 MINUTES: Routine safety checks  - Q WAKING SHIFT & PRN: Assess risk to determine if routine checks are adequate to maintain patient safety  - Encourage patient to participate actively in care by formulating a plan to combat response to suicidal ideation, identify supports and resources  Outcome: Progressing     Problem: PSYCHOSIS  Goal: Will report no hallucinations or delusions  Description: Interventions:  - Administer medication as  ordered  - Every waking shifts and PRN assess for the presence of hallucinations and or delusions  - Assist with reality testing to support increasing orientation  - Assess if patient's hallucinations or delusions are encouraging self-harm or harm to others and intervene as appropriate  Outcome: Progressing     Problem: DISCHARGE PLANNING  Goal: Discharge to home or other facility with appropriate resources  Description: INTERVENTIONS:  - Identify barriers to discharge w/patient and caregiver  - Arrange for needed discharge resources and transportation as appropriate  - Identify discharge learning needs (meds, wound care, etc )  - Arrange for interpretive services to assist at discharge as needed  - Refer to Case Management Department for coordinating discharge planning if the patient needs post-hospital services based on physician/advanced practitioner order or complex needs related to functional status, cognitive ability, or social support system  Outcome: Progressing

## 2022-11-01 NOTE — PROGRESS NOTES
Progress Note - Kaykay Reid 33 y o  male MRN: [id number]  Unit/Bed#: [facility] 348-01 Encounter: [id number]    Assessment/Plan   Principal Problem:    Major depression with psychotic features [facility])  Active Problems:    Wrist laceration    PTSD (post-traumatic stress disorder)    Medical clearance for psychiatric admission    Paranoia (psychosis) ([facility] [address] )      Recommended Treatment:     1  Will make the following medication changes:  2  Continue with current medications:  a  Discontinue Haldol  b  Restart Geodon 20 mg b i d  With meals for psychosis, will continue to monitor and titrate as indicated/tolerated  3  Continue with group therapy, milieu therapy and occupational therapy  4  Continue frequent safety checks and vitals per unit protocol  5  Continue with SLIM medical management as indicated  6  Continue coordinating with case management regarding disposition    Case discussed with treatment team   Risks, benefits and possible side effects of Medications: Risks, benefits, and possible side effects of medications have been explained to the patient, who verbalizes understanding    Legal Status: 303  Diet:  Normal house diet  Disposition: To be determined, Coordinating with case management    ------------------------------------------------------------    Subjective: Per nursing report, Berna Haines has been visible, suspicious, but cooperative on the unit and noncompliant with medications  Overnight, the patient was able to return to his room without incident  Berna Haines was evaluated this morning for continuing care  On examination, Berna Haines is calm, irritable, actively splitting staff, dressed casually and standing in his room  He states his mood is "fine " He reports sleeping poorly, stating she had significant difficulty staying asleep after receiving Haldol last evening due to it making him feel foggy and his energy level today is low to adequate   His appetite has been good, but the patient endorses decreased oral intake from baseline  He endorses adverse effects from medications, specifically Haldol making him feel as though he has brain fog and overly sedated, he is requesting to be restarted on Geodon and states that he will be compliant  His goals for today are to remain in behavioral control, medication compliant, and attend groups  Today, Vitaliy Juárez is sharif for safety on the unit  He denies suicidal ideation, homicidal ideation, auditory hallucinations, and visual hallucinations  He denies paranoid thoughts or ideation, however, the patient presents a list names of staff members which he feels are targeting him, but will only hand of folded up to the attending physician  The patient also states that the resident physician in the room is listed as 1 of the staff members to which she has complained against, stating "the people here are the worst, I really Juan Alu talk to you," indicating the attending physician  The patient appears significantly paranoid with persecutory delusions, however is amenable to restarting and titration of Geodon  PRNs overnight:  None   VS: Reviewed, within normal limits    Progress Toward Goals: No improvement    Psychiatric Review of Systems:  Behavior over the last 24 hours: unchanged  Sleep: insomnia  Appetite: adequate  Medication side effects: none verbalized  Medical ROS: Complete review of systems is negative except as noted above      Vital signs in last 24 hours:  Temp:  [97 3 °F (36 3 °C)-97 6 °F (36 4 °C)] 97 6 °F (36 4 °C)  HR:  [71-73] 73  Resp:  [16] 16  BP: (121-138)/(73-74) 121/74    Mental Status Exam:  Appearance:  alert, good eye contact, appears older than stated age, casually dressed, disheveled, thin and bearded   Behavior:  calm and Guarded, standing up, superficially cooperative, actively splitting staff members   Motor: no abnormal movements and normal gait and balance   Speech:  spontaneous, clear, normal rate, normal volume and coherent   Mood:  "Fine"   Affect:  mood-incongruent, constricted, anxious and irritable   Thought Process:  generally linear and goal-directed but Illogical and perseverative at times   Thought Content: Persecutory delusions, paranoid ideation, somatic preoccupation   Perceptual disturbances: no reported hallucinations and does not appear to be responding to internal stimuli at this time   Risk Potential: No active or passive suicidal or homicidal ideation was verbalized during interview   Cognition: oriented to self and situation, memory grossly intact, appears to be of average intelligence, normal abstract reasoning and cognition not formally tested   Insight:  Limited   Judgment: Limited     Current Medications:  Current Facility-Administered Medications   Medication Dose Route Frequency Provider Last Rate   • acetaminophen  650 mg Oral Q6H PRN Yuly Horta MD     • aluminum-magnesium hydroxide-simethicone  30 mL Oral Q4H PRN Yuly Horta MD     • artificial tear  1 application Both Eyes B8E PRN Yuly Horta MD     • haloperidol lactate  2 5 mg Intramuscular Q6H PRN Max 4/day Yuly Horta MD      And   • LORazepam  1 mg Intramuscular Q6H PRN Max 4/day Yuly Horta MD      And   • benztropine  0 5 mg Intramuscular Q6H PRN Max 4/day Yuly Horta MD     • haloperidol lactate  5 mg Intramuscular Q4H PRN Max 4/day Yuly Horta MD      And   • LORazepam  2 mg Intramuscular Q4H PRN Max 4/day Yuly Horta MD      And   • benztropine  1 mg Intramuscular Q4H PRN Max 4/day Yuly Horta MD     • benztropine  1 mg Intramuscular Q4H PRN Max 6/day Yuly Horta MD     • benztropine  1 mg Oral Q4H PRN Max 6/day Yuly Horta MD     • hydrOXYzine HCL  50 mg Oral Q6H PRN Max 4/day Yuly Horta MD      Or   • diphenhydrAMINE  50 mg Intramuscular Q6H PRN Yuly Horta MD     • haloperidol  2 mg Oral Q4H PRN Max 6/day Yuly Horta MD     • haloperidol  5 mg Oral Q6H PRN Max 4/day Yuly Horta MD     • haloperidol  5 mg Oral Q4H PRN Max 4/day Julio César Aviles MD Stephanie     • hydrOXYzine HCL  100 mg Oral Q6H PRN Max 4/day Antonio Berry MD      Or   • LORazepam  2 mg Intramuscular Q6H PRN Antonio Berry MD     • hydrOXYzine HCL  25 mg Oral Q6H PRN Max 4/day Antonio Berry MD     • ibuprofen  200 mg Oral Q6H PRN Antonio Berry MD     • ibuprofen  400 mg Oral Q6H PRN Antonio Berry MD     • ibuprofen  600 mg Oral Q8H PRN Antonio Berry MD     • melatonin  3 mg Oral HS PRN Antonio Berry MD     • nicotine  14 mg Transdermal Daily Antonio Berry MD     • OLANZapine  10 mg Oral Q3H PRN Max 3/day Antonio Berry MD      Or   • OLANZapine  10 mg Intramuscular Q3H PRN Max 3/day Antonio Berry MD     • OLANZapine  5 mg Oral Q3H PRN Max 6/day Antonio Berry MD      Or   • OLANZapine  5 mg Intramuscular Q3H PRN Max 6/day Antonio Berry MD     • OLANZapine  2 5 mg Oral Q3H PRN Max 8/day Antonio Berry MD     • polyethylene glycol  17 g Oral Daily PRN Antonio Berry MD     • propranolol  10 mg Oral Q8H PRN Antonio Berry MD     • psyllium  1 packet Oral Daily Antonio Berry MD     • senna-docusate sodium  1 tablet Oral Daily PRN Antonio Berry MD     • ziprasidone  20 mg Oral BID With Meals Indu Villafana, DO         Behavioral Health Medications: all current active meds have been reviewed  Changes as in plan section above  Laboratory results:  I have personally reviewed all pertinent laboratory/tests results  No results found for this or any previous visit (from the past 48 hour(s))  This note has been constructed using a voice recognition system  There may be translation, syntax, or grammatical errors  If you have any questions, please contact the dictating author      Indu Villafana  Psychiatry Residency, PGY-1

## 2022-11-01 NOTE — NURSING NOTE
Patient visible intermittently throughout the evening  Cooperative with routine  Denied any unmet needs  No reported side-effects  Will monitor

## 2022-11-01 NOTE — SOCIAL WORK
JOHAN received voicemail from [employer] requesting update  SW returned phone call and left voicemail to provide update  JOHAN requested return call to discuss family's perspective on Pt's progress in tx

## 2022-11-02 RX ADMIN — PSYLLIUM HUSK 1 PACKET: 3.4 POWDER ORAL at 09:10

## 2022-11-02 RX ADMIN — NICOTINE POLACRILEX 2 MG: 2 GUM, CHEWING ORAL at 15:51

## 2022-11-02 RX ADMIN — NICOTINE 14 MG: 14 PATCH, EXTENDED RELEASE TRANSDERMAL at 09:10

## 2022-11-02 RX ADMIN — ZIPRASIDONE HYDROCHLORIDE 20 MG: 20 CAPSULE ORAL at 09:09

## 2022-11-02 RX ADMIN — ZIPRASIDONE HYDROCHLORIDE 20 MG: 20 CAPSULE ORAL at 16:40
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
## 2022-11-02 NOTE — NURSING NOTE
Patient approached nursing and requested some hot tea "I've been sleeping " He was given tea and returned to his bedroom

## 2022-11-02 NOTE — NURSING NOTE
The pt approached this writer after breakfast for his medications  This writer attempted to assess the pt he was very brief, denied pain but refused to answer any other questions he stated "you know the answers already, I told you yesterday", then he proceeded to say " I'm asked the same questions all the time from everyone"  This writer informed the pt questions are an informative way for staff to work together in order to give consistent treatment and it also indicates  any improvements made from the point of admission on a daily basis  The pt listened, was polite but refused to answer any further questions  The pt stated to this writer "people are trying to poison me", when this writer asked  What people, the pt stated " I  Can't tell you it's operational secrecy"  The pt was asked how they was attempting to poison him he was reluctant to answer,then he said through his food and drink " The pt received his medications then returned to his room

## 2022-11-02 NOTE — PROGRESS NOTES
Progress Note - Kaykay Rowandisha Reid 33 y o  male MRN: [id number]  Unit/Bed#: [facility] 348-01 Encounter: [id number]    Assessment/Plan   Principal Problem:    Major depression with psychotic features [facility])  Active Problems:    Wrist laceration    PTSD (post-traumatic stress disorder)    Medical clearance for psychiatric admission    Paranoia (psychosis) ([facility] [address] )      Recommended Treatment:     1  No medication changes at this time  2  Continue with current medications:  a  Geodon 20 mg b i d  With meals for psychosis, will continue to monitor and titrate as indicated/tolerated  3  Continue with group therapy, milieu therapy and occupational therapy  4  Continue frequent safety checks and vitals per unit protocol  5  Continue with SLIM medical management as indicated  6  Continue coordinating with case management regarding disposition    Case discussed with treatment team   Risks, benefits and possible side effects of Medications: Risks, benefits, and possible side effects of medications have previously been explained  No new medications at this time  Legal Status: 303  Diet: Normal House Diet  Disposition: To be determined, Coordinating with case management    ------------------------------------------------------------    Subjective: Per nursing report, Annemarie Weston has been bizarre, tangential, isolative but cooperative on the unit and compliant with medications  Overnight, the patient remained in his room, only getting up to receive some hot tea and then returning to his room without incident  Annemarie Weston was evaluated this morning for continuing care  On examination, Annemarie Weston is calm, pleasant, cooperative, standing up next his bed and dressed in hospital attire  He states his mood is "fine " He reports sleeping well, denying difficulty falling asleep or staying asleep and his energy level today is adequate   His appetite has been good, he endorses eating his food and eating enough calories for the Geodon to be effective  He denies any adverse effects from medications  His goals for today are to remain in behavioral control, medication compliant, and go to groups  Today, Neel Brice is sharif for safety on the unit  He denies suicidal ideation, homicidal ideation, auditory hallucinations, and visual hallucinations  He denies paranoid thoughts or ideation, however, the patient does appear to be somewhat paranoid with persecutory delusions, the patient states he lives at home and it is very difficult for him to participate socially, the patient states he is willing to attempt going to groups and not necessarily sharing anything because he feels as though he shares things people use this information against him  PRNs overnight:  None   VS: Reviewed, within normal limits    Progress Toward Goals: Slow improvement    Psychiatric Review of Systems:  Behavior over the last 24 hours: improved  Sleep: normal  Appetite: adequate  Medication side effects: none verbalized  Medical ROS: Complete review of systems is negative except as noted above      Vital signs in last 24 hours:  Temp:  [97 6 °F (36 4 °C)-98 3 °F (36 8 °C)] 98 3 °F (36 8 °C)  HR:  [73-85] 85  Resp:  [16] 16  BP: (121-136)/(64-74) 136/64    Mental Status Exam:  Appearance:  alert, good eye contact, appears stated age, Hospital attire dressed, appropriate grooming and hygiene, thin and bearded   Behavior:  calm, cooperative, guarded and standing up next to bed   Motor: no abnormal movements and normal gait and balance   Speech:  spontaneous, clear, normal rate, normal volume and coherent   Mood:  "fine"   Affect:  constricted and brighter than previous   Thought Process:  generally linear and goal-directed but perseverative at times   Thought Content: persecutory delusions, paranoid ideation, somatic preoccupation   Perceptual disturbances: no reported hallucinations and does not appear to be responding to internal stimuli at this time   Risk Potential: No active or passive suicidal or homicidal ideation was verbalized during interview   Cognition: oriented to self and situation, memory grossly intact, appears to be of average intelligence and cognition not formally tested   Insight:  Limited   Judgment: Improving     Current Medications:  Current Facility-Administered Medications   Medication Dose Route Frequency Provider Last Rate   • acetaminophen  650 mg Oral Q6H PRN Porsha Enamorado MD     • aluminum-magnesium hydroxide-simethicone  30 mL Oral Q4H PRN Porsha Enamorado MD     • artificial tear  1 application Both Eyes Z8F PRN Porsha Enamorado MD     • haloperidol lactate  2 5 mg Intramuscular Q6H PRN Max 4/day Porsha Enamorado MD      And   • LORazepam  1 mg Intramuscular Q6H PRN Max 4/day Porsha Enamorado MD      And   • benztropine  0 5 mg Intramuscular Q6H PRN Max 4/day Porsha Enamorado MD     • haloperidol lactate  5 mg Intramuscular Q4H PRN Max 4/day Porsha Enamorado MD      And   • LORazepam  2 mg Intramuscular Q4H PRN Max 4/day Porsha Enamorado MD      And   • benztropine  1 mg Intramuscular Q4H PRN Max 4/day Porsha Enamorado MD     • benztropine  1 mg Intramuscular Q4H PRN Max 6/day Porsha Enamorado MD     • benztropine  1 mg Oral Q4H PRN Max 6/day Porsha Enamorado MD     • hydrOXYzine HCL  50 mg Oral Q6H PRN Max 4/day Porsha Enamorado MD      Or   • diphenhydrAMINE  50 mg Intramuscular Q6H PRN Porsha Enamorado MD     • haloperidol  2 mg Oral Q4H PRN Max 6/day Porsha Enamorado MD     • haloperidol  5 mg Oral Q6H PRN Max 4/day Porsha Enamorado MD     • haloperidol  5 mg Oral Q4H PRN Max 4/day Porsha Enamorado MD     • hydrOXYzine HCL  100 mg Oral Q6H PRN Max 4/day Porsha Enamorado MD      Or   • LORazepam  2 mg Intramuscular Q6H PRN Porsha Enamorado MD     • hydrOXYzine HCL  25 mg Oral Q6H PRN Max 4/day Porsha Enamorado MD     • ibuprofen  200 mg Oral Q6H PRN Porsha Enamorado MD     • ibuprofen  400 mg Oral Q6H PRN Porsha Enamorado MD     • ibuprofen  600 mg Oral Q8H PRN Porsha Enamorado MD     • melatonin  3 mg Oral HS PRN Porsha Enamorado MD     • nicotine  14 mg Transdermal Daily Sulma Crandall MD     • OLANZapine  10 mg Oral Q3H PRN Max 3/day Sulma Crandall MD      Or   • OLANZapine  10 mg Intramuscular Q3H PRN Max 3/day Sulma Crandall MD     • OLANZapine  5 mg Oral Q3H PRN Max 6/day Sulma Crandall MD      Or   • OLANZapine  5 mg Intramuscular Q3H PRN Max 6/day Sulma Crandall MD     • OLANZapine  2 5 mg Oral Q3H PRN Max 8/day Sulma Crandall MD     • polyethylene glycol  17 g Oral Daily PRN Sulma Crandall MD     • propranolol  10 mg Oral Q8H PRN Sulma Crandall MD     • psyllium  1 packet Oral Daily Sulma Crandall MD     • senna-docusate sodium  1 tablet Oral Daily PRN Sulma Crandall MD     • ziprasidone  20 mg Oral BID With Meals Kerri Prince DO         Behavioral Health Medications: all current active meds have been reviewed  Changes as in plan section above  Laboratory results:  I have personally reviewed all pertinent laboratory/tests results  No results found for this or any previous visit (from the past 48 hour(s))  This note has been constructed using a voice recognition system  There may be translation, syntax, or grammatical errors  If you have any questions, please contact the dictating author      Kerri Prince  Psychiatry Residency, PGY-1

## 2022-11-02 NOTE — NURSING NOTE
Patient was in bed this evening and not out in the milieu   He did not receive any scheduled medication and did not request prn medication

## 2022-11-02 NOTE — PROGRESS NOTES
11/02/22 0851   Team Meeting   Meeting Type Daily Rounds   Team Members Present   Team Members Present Physician;Nurse;   Physician Team Member [facility]   Nursing Team Member [facility]   Care Management Team Member [place]   Patient/Family Present   Patient Present No   Med/meal compliant  Pt bizarre and appears paranoid  Pt remains with psychosis  Pt compliant with Geodon  DC tbd

## 2022-11-02 NOTE — PLAN OF CARE
Problem: Alteration in Thoughts and Perception  Goal: Treatment Goal: Gain control of psychotic behaviors/thinking, reduce/eliminate presenting symptoms and demonstrate improved reality functioning upon discharge  Outcome: Progressing  Goal: Refrain from acting on delusional thinking/internal stimuli  Description: Interventions:  - Monitor patient closely, per order   - Utilize least restrictive measures   - Set reasonable limits, give positive feedback for acceptable   - Administer medications as ordered and monitor of potential side effects  Outcome: Progressing  Goal: Agree to be compliant with medication regime, as prescribed and report medication side effects  Description: Interventions:  - Offer appropriate PRN medication and supervise ingestion; conduct AIMS, as needed   Outcome: Progressing     Problem: Depression  Goal: Treatment Goal: Demonstrate behavioral control of depressive symptoms, verbalize feelings of improved mood/affect, and adopt new coping skills prior to discharge  Outcome: Progressing  Goal: Verbalize thoughts and feelings  Description: Interventions:  - Assess and re-assess patient's level of risk   - Engage patient in 1:1 interactions, daily, for a minimum of 15 minutes   - Encourage patient to express feelings, fears, frustrations, hopes   Outcome: Progressing  Goal: Refrain from harming self  Description: Interventions:  - Monitor patient closely, per order   - Supervise medication ingestion, monitor effects and side effects   Outcome: Progressing  Goal: Refrain from isolation  Description: Interventions:  - Develop a trusting relationship   - Encourage socialization   Outcome: Progressing  Goal: Attend and participate in unit activities, including therapeutic, recreational, and educational groups  Description: Interventions:  - Provide therapeutic and educational activities daily, encourage attendance and participation, and document same in the medical record   Outcome: Progressing Problem: Ineffective Coping  Goal: Participates in unit activities  Description: Interventions:  - Provide therapeutic environment   - Provide required programming   - Redirect inappropriate behaviors   Outcome: Progressing     Problem: SELF HARM/SUICIDALITY  Goal: Will have no self-injury during hospital stay  Description: INTERVENTIONS:  - Q 15 MINUTES: Routine safety checks  - Q WAKING SHIFT & PRN: Assess risk to determine if routine checks are adequate to maintain patient safety  - Encourage patient to participate actively in care by formulating a plan to combat response to suicidal ideation, identify supports and resources  Outcome: Progressing     Problem: DEPRESSION  Goal: Will be euthymic at discharge  Description: INTERVENTIONS:  - Administer medication as ordered  - Provide emotional support via 1:1 interaction with staff  - Encourage involvement in milieu/groups/activities  - Monitor for social isolation  Outcome: Progressing     Problem: INVOLUNTARY ADMIT  Goal: Will cooperate with staff recommendations and doctor's orders and will demonstrate appropriate behavior  Description: INTERVENTIONS:  - Treat underlying conditions and offer medication as ordered  - Educate regarding involuntary admission procedures and rules  - Utilize positive consistent limit setting strategies to support patient and staff safety  Outcome: Progressing     Problem: DISCHARGE PLANNING  Goal: Discharge to home or other facility with appropriate resources  Description: INTERVENTIONS:  - Identify barriers to discharge w/patient and caregiver  - Arrange for needed discharge resources and transportation as appropriate  - Identify discharge learning needs (meds, wound care, etc )  - Arrange for interpretive services to assist at discharge as needed  - Refer to Case Management Department for coordinating discharge planning if the patient needs post-hospital services based on physician/advanced practitioner order or complex needs related to functional status, cognitive ability, or social support system  Outcome: Progressing

## 2022-11-03 RX ORDER — ZIPRASIDONE HYDROCHLORIDE 40 MG/1
40 CAPSULE ORAL 2 TIMES DAILY WITH MEALS
Status: DISCONTINUED | OUTPATIENT
Start: 2022-11-03 | End: 2022-11-11 | Stop reason: HOSPADM

## 2022-11-03 RX ADMIN — PSYLLIUM HUSK 1 PACKET: 3.4 POWDER ORAL at 08:17

## 2022-11-03 RX ADMIN — ZIPRASIDONE HYDROCHLORIDE 20 MG: 20 CAPSULE ORAL at 08:17

## 2022-11-03 RX ADMIN — NICOTINE 14 MG: 14 PATCH, EXTENDED RELEASE TRANSDERMAL at 08:17

## 2022-11-03 RX ADMIN — ZIPRASIDONE HYDROCHLORIDE 40 MG: 40 CAPSULE ORAL at 16:56

## 2022-11-03 NOTE — PROGRESS NOTES
11/03/22 1000   Activity/Group Checklist   Group Other (Comment)  (Group Art Therapy/Psychodynamic, Creatively Exploring Relationships with Affirmations)   Attendance Attended   Attendance Duration (min) Greater than 60   Interactions Interacted appropriately   Affect/Mood Appropriate   Goals Achieved Able to listen to others; Able to engage in interactions; Able to recieve feedback; Able to give feedback to another  (able to engage materials and directive; full participation with discussion)

## 2022-11-03 NOTE — NURSING NOTE
Pt isolated to room and withdrawn to self for majority of evening  Pt appears paranoid and suspicious, is bizarre and guarded upon interaction  Pt denies SI/HI/AH/VH, but appears internally preoccupied  Pt denies unmet needs

## 2022-11-03 NOTE — PROGRESS NOTES
Progress Note - Guillermoreinaldo Norman Reid 33 y o  male MRN: [id number]  [facility] Encounter: [id number]    Assessment/Plan   Principal Problem:    Major depression with psychotic features [facility])  Active Problems:    Wrist laceration    PTSD (post-traumatic stress disorder)    Medical clearance for psychiatric admission    Paranoia (psychosis) ([facility] [address] )      Recommended Treatment:     1  Will make the following medication changes:  2  Continue with current medications:  a  Increase Geodon to 40mg BID with meals for psychosis, will continue to monitor and titrate as indicated/tolerated  b  Continue with group therapy, milieu therapy and occupational therapy  3  Continue frequent safety checks and vitals per unit protocol  4  Continue with SLIM medical management as indicated  5  Continue coordinating with case management regarding disposition    Case discussed with treatment team   Risks, benefits and possible side effects of Medications: Risks, benefits, and possible side effects of medications have been explained to the patient, who verbalizes understanding    Legal Status: 303  Diet: Normal House Diet  Disposition: To be determined, Coordinating with case management    ------------------------------------------------------------    Subjective: Per nursing report, Gee Evans has been isolative, paranoid, suspicious, guarded, bizarre but cooperative on the unit and compliant with medications  Overnight, the patient remained in his room without incident  Gee Evans was evaluated this morning for continuing care  On examination, Gee Evans is calm, guarded, superficially cooperative, standing next to his bed in hospital attire  He states his mood is "good " He reports sleeping well, denying difficulty falling or staying asleep and endorsing 8 hours and his energy level today is adequate  His appetite has been good and he endorses eating breakfast without difficulty   He denies any adverse effects from medications  His goals for today are to remain in behavioral control, medication compliant, and attend some groups  Today, Taz Landrum is sharif for safety on the unit  He denies suicidal ideation, homicidal ideation, auditory hallucinations, and visual hallucinations  He denies paranoid thoughts or ideation, however, the patient remains suspicious, paranoid and guarded on interview, specifically handing an "incident report" that he wrote up to the attending physician and only acknowledging the attending physician until redirected  The patient continue to be bizarre and constricted in affect but with improved range and mood compared to previously  PRNs overnight: None   VS: Reviewed, within normal limits    Progress Toward Goals: Slow improvement    Psychiatric Review of Systems:  Behavior over the last 24 hours: improved  Sleep: normal  Appetite: adequate  Medication side effects: none verbalized  Medical ROS: Complete review of systems is negative except as noted above      Vital signs in last 24 hours:  Temp:  [97 4 °F (36 3 °C)-97 8 °F (36 6 °C)] 97 4 °F (36 3 °C)  HR:  [70-77] 70  Resp:  [16] 16  BP: (121-161)/(65-96) 121/68    Mental Status Exam:  Appearance:  alert, good eye contact, appears stated age, casually dressed, marginal grooming/hygiene and thin   Behavior:  calm, guarded and superficially cooperative   Motor: no abnormal movements and normal gait and balance   Speech:  spontaneous, clear, normal rate, normal volume and coherent   Mood:  "good"   Affect:  constricted and brighter than previous   Thought Process:  generally linear and goal-directed but perseverative at times   Thought Content: persecutory delusions, paranoid ideation, somatic preoccupation   Perceptual disturbances: no reported hallucinations and does not appear to be responding to internal stimuli at this time   Risk Potential: No active or passive suicidal or homicidal ideation was verbalized during interview Cognition: oriented to self and situation, memory grossly intact, appears to be of average intelligence and cognition not formally tested   Insight:  Improving   Judgment: Improving     Current Medications:  Current Facility-Administered Medications   Medication Dose Route Frequency Provider Last Rate   • acetaminophen  650 mg Oral Q6H PRN Nena Saravia MD     • aluminum-magnesium hydroxide-simethicone  30 mL Oral Q4H PRN Nena Saravia MD     • artificial tear  1 application Both Eyes R3Z PRN Nena Saravia MD     • haloperidol lactate  2 5 mg Intramuscular Q6H PRN Max 4/day Nena Saravia MD      And   • LORazepam  1 mg Intramuscular Q6H PRN Max 4/day Nena Saravia MD      And   • benztropine  0 5 mg Intramuscular Q6H PRN Max 4/day Nena Saravia MD     • haloperidol lactate  5 mg Intramuscular Q4H PRN Max 4/day Nena Saravia MD      And   • LORazepam  2 mg Intramuscular Q4H PRN Max 4/day Nena Saravia MD      And   • benztropine  1 mg Intramuscular Q4H PRN Max 4/day Nena Saravia MD     • benztropine  1 mg Intramuscular Q4H PRN Max 6/day Nena Saravia MD     • benztropine  1 mg Oral Q4H PRN Max 6/day Nena Saravia MD     • hydrOXYzine HCL  50 mg Oral Q6H PRN Max 4/day Nena Saravia MD      Or   • diphenhydrAMINE  50 mg Intramuscular Q6H PRN Nena Saravia MD     • haloperidol  2 mg Oral Q4H PRN Max 6/day Nena Saravia MD     • haloperidol  5 mg Oral Q6H PRN Max 4/day Nena Saravia MD     • haloperidol  5 mg Oral Q4H PRN Max 4/day Nena Saravia MD     • hydrOXYzine HCL  100 mg Oral Q6H PRN Max 4/day Nena Saravia MD      Or   • LORazepam  2 mg Intramuscular Q6H PRN Nena Saravia MD     • hydrOXYzine HCL  25 mg Oral Q6H PRN Max 4/day Nena Saravia MD     • ibuprofen  200 mg Oral Q6H PRN Nena Saravia MD     • ibuprofen  400 mg Oral Q6H PRN Nena Saravia MD     • ibuprofen  600 mg Oral Q8H PRN Nena Saravia MD     • melatonin  3 mg Oral HS PRN Nena Saravia MD     • nicotine  14 mg Transdermal Daily Nena Saravia MD     • nicotine polacrilex  2 mg Oral Q2H PRN Baltazar Montejo PA-C     • OLANZapine  10 mg Oral Q3H PRN Max 3/day Toyin Zimmerman MD      Or   • OLANZapine  10 mg Intramuscular Q3H PRN Max 3/day Toyin Zimmerman MD     • OLANZapine  5 mg Oral Q3H PRN Max 6/day Toyin Zimmerman MD      Or   • OLANZapine  5 mg Intramuscular Q3H PRN Max 6/day Toyin Zimmerman MD     • OLANZapine  2 5 mg Oral Q3H PRN Max 8/day Toyin Zimmerman MD     • polyethylene glycol  17 g Oral Daily PRN Toyin Zimmerman MD     • propranolol  10 mg Oral Q8H PRN Toyin Zimmerman MD     • psyllium  1 packet Oral Daily Toyin Zimmerman MD     • senna-docusate sodium  1 tablet Oral Daily PRN Toyin Zimmerman MD     • ziprasidone  40 mg Oral BID With Meals Nader Jovel DO         Behavioral Health Medications: all current active meds have been reviewed  Changes as in plan section above  Laboratory results:  I have personally reviewed all pertinent laboratory/tests results  No results found for this or any previous visit (from the past 48 hour(s))  This note has been constructed using a voice recognition system  There may be translation, syntax, or grammatical errors  If you have any questions, please contact the dictating author      Nader Jovel  Psychiatry Residency, PGY-1

## 2022-11-03 NOTE — PROGRESS NOTES
11/03/22 0752   Team Meeting   Meeting Type Daily Rounds   Team Members Present   Team Members Present Physician;Nurse;   Physician Team Member Mark [id number] [facility] Luis Alberto   Patient/Family Present   Patient Present No   Patient's Family Present No   Pt seclusive to room  Appears paranoid, guarded and suspicious  Pt denying all symptoms  Compliant with medication and mouth checks  Discharge to be determined

## 2022-11-03 NOTE — NURSING NOTE
Pt visible intermittently throughout the shift  Denies SI/HI/AVH at this time, reports adequate sleep and denies any anxiety or depression currently  Pt is cooperative, appears guarded, paranoid  Pt is complaint with scheduled medications, mouth checks and meals, appetite good on kosher diet  Pt voiced concern regarding sutures to B/L wrists that remain in place, inquired as to when sutures would be removed  Medical made aware  Pt denies any unmet needs at this time  No behavioral issues to be noted  Q7 minute safety checks maintained

## 2022-11-03 NOTE — PLAN OF CARE
Problem: Alteration in Thoughts and Perception  Goal: Treatment Goal: Gain control of psychotic behaviors/thinking, reduce/eliminate presenting symptoms and demonstrate improved reality functioning upon discharge  Outcome: Progressing  Goal: Refrain from acting on delusional thinking/internal stimuli  Description: Interventions:  - Monitor patient closely, per order   - Utilize least restrictive measures   - Set reasonable limits, give positive feedback for acceptable   - Administer medications as ordered and monitor of potential side effects  Outcome: Progressing  Goal: Agree to be compliant with medication regime, as prescribed and report medication side effects  Description: Interventions:  - Offer appropriate PRN medication and supervise ingestion; conduct AIMS, as needed   Outcome: Progressing     Problem: Depression  Goal: Treatment Goal: Demonstrate behavioral control of depressive symptoms, verbalize feelings of improved mood/affect, and adopt new coping skills prior to discharge  Outcome: Progressing  Goal: Verbalize thoughts and feelings  Description: Interventions:  - Assess and re-assess patient's level of risk   - Engage patient in 1:1 interactions, daily, for a minimum of 15 minutes   - Encourage patient to express feelings, fears, frustrations, hopes   Outcome: Progressing  Goal: Refrain from harming self  Description: Interventions:  - Monitor patient closely, per order   - Supervise medication ingestion, monitor effects and side effects   Outcome: Progressing  Goal: Refrain from isolation  Description: Interventions:  - Develop a trusting relationship   - Encourage socialization   Outcome: Progressing  Goal: Attend and participate in unit activities, including therapeutic, recreational, and educational groups  Description: Interventions:  - Provide therapeutic and educational activities daily, encourage attendance and participation, and document same in the medical record   Outcome: Progressing Problem: SELF HARM/SUICIDALITY  Goal: Will have no self-injury during hospital stay  Description: INTERVENTIONS:  - Q 15 MINUTES: Routine safety checks  - Q WAKING SHIFT & PRN: Assess risk to determine if routine checks are adequate to maintain patient safety  - Encourage patient to participate actively in care by formulating a plan to combat response to suicidal ideation, identify supports and resources  Outcome: Progressing     Problem: DEPRESSION  Goal: Will be euthymic at discharge  Description: INTERVENTIONS:  - Administer medication as ordered  - Provide emotional support via 1:1 interaction with staff  - Encourage involvement in milieu/groups/activities  - Monitor for social isolation  Outcome: Progressing     Problem: PSYCHOSIS  Goal: Will report no hallucinations or delusions  Description: Interventions:  - Administer medication as  ordered  - Every waking shifts and PRN assess for the presence of hallucinations and or delusions  - Assist with reality testing to support increasing orientation  - Assess if patient's hallucinations or delusions are encouraging self-harm or harm to others and intervene as appropriate  Outcome: Progressing     Problem: INVOLUNTARY ADMIT  Goal: Will cooperate with staff recommendations and doctor's orders and will demonstrate appropriate behavior  Description: INTERVENTIONS:  - Treat underlying conditions and offer medication as ordered  - Educate regarding involuntary admission procedures and rules  - Utilize positive consistent limit setting strategies to support patient and staff safety  Outcome: Progressing

## 2022-11-04 RX ORDER — DESVENLAFAXINE 50 MG/1
50 TABLET, EXTENDED RELEASE ORAL DAILY
Status: DISCONTINUED | OUTPATIENT
Start: 2022-11-04 | End: 2022-11-11 | Stop reason: HOSPADM

## 2022-11-04 RX ADMIN — ZIPRASIDONE HYDROCHLORIDE 40 MG: 40 CAPSULE ORAL at 08:10

## 2022-11-04 RX ADMIN — DESVENLAFAXINE 50 MG: 50 TABLET, FILM COATED, EXTENDED RELEASE ORAL at 14:10

## 2022-11-04 RX ADMIN — ZIPRASIDONE HYDROCHLORIDE 40 MG: 40 CAPSULE ORAL at 16:51

## 2022-11-04 RX ADMIN — NICOTINE 14 MG: 14 PATCH, EXTENDED RELEASE TRANSDERMAL at 08:10

## 2022-11-04 RX ADMIN — PSYLLIUM HUSK 1 PACKET: 3.4 POWDER ORAL at 08:10

## 2022-11-04 NOTE — ASSESSMENT & PLAN NOTE
· Visually inspected lacerations on 11/01/22  · Bilateral lacerations appear to be well healed however there is visual evidence of retained suture in L wrist  No open areas or signs of infection  · Reached out to Dr Ino Vences via tiger text who recommended to leave suture in place if not bothersome to patient  · Discussed assessment and plan with patient  He denies any physical discomfort, however he was concerned that the suture was purposely left in with mal intent  I reviewed the timeline from suture placement to removal and recommendations made by ortho  I assured patient that his wound was treated appropriately and given the severity of the laceration, the sutures required a lengthy amount of time for the wound to properly close  He expressed understanding and agrees to leave retained suture in place  · At this time, removal of retained suture will not be attempted

## 2022-11-04 NOTE — NURSING NOTE
Pt awake and alert, visible on the unit and social with peers this morning  Pt appears guarded and suspicious, but is cooperative  Compliant with scheduled medications, mouth checks and meals  Denies SI/HI/AVH at this time, but appears internally preoccupied  Encouraged to attend groups  Denies any unmet needs  Will monitor

## 2022-11-04 NOTE — NURSING NOTE
Pt visible on unit most of this evening, interactive with peers  Pt appears guarded, suspicious of staff  Pt is superficially pleasant and cooperative  Pt denies any SI/HI/AH/VH

## 2022-11-04 NOTE — PLAN OF CARE
Pt remains mostly isolative to his room but visible on the unit at times  Pt requesting to see Dr Anthony Dos Santos for OP as he enjoyed working with him last weekend  SW to reach out to [employer] to determine if this is a possibility

## 2022-11-04 NOTE — PROGRESS NOTES
11/04/22 1300   Activity/Group Checklist   Group   (recovery group)   Attendance Attended   Attendance Duration (min) 46-60   Interactions Interacted appropriately   Affect/Mood Appropriate   Goals Achieved Discussed coping strategies; Discussed self-esteem issues; Able to listen to others; Able to engage in interactions; Able to self-disclose; Able to recieve feedback; Able to give feedback to another

## 2022-11-04 NOTE — PLAN OF CARE
Problem: Alteration in Thoughts and Perception  Goal: Treatment Goal: Gain control of psychotic behaviors/thinking, reduce/eliminate presenting symptoms and demonstrate improved reality functioning upon discharge  Outcome: Progressing  Goal: Refrain from acting on delusional thinking/internal stimuli  Description: Interventions:  - Monitor patient closely, per order   - Utilize least restrictive measures   - Set reasonable limits, give positive feedback for acceptable   - Administer medications as ordered and monitor of potential side effects  Outcome: Progressing  Goal: Agree to be compliant with medication regime, as prescribed and report medication side effects  Description: Interventions:  - Offer appropriate PRN medication and supervise ingestion; conduct AIMS, as needed   Outcome: Progressing     Problem: Depression  Goal: Treatment Goal: Demonstrate behavioral control of depressive symptoms, verbalize feelings of improved mood/affect, and adopt new coping skills prior to discharge  Outcome: Progressing  Goal: Verbalize thoughts and feelings  Description: Interventions:  - Assess and re-assess patient's level of risk   - Engage patient in 1:1 interactions, daily, for a minimum of 15 minutes   - Encourage patient to express feelings, fears, frustrations, hopes   Outcome: Progressing  Goal: Refrain from harming self  Description: Interventions:  - Monitor patient closely, per order   - Supervise medication ingestion, monitor effects and side effects   Outcome: Progressing  Goal: Refrain from isolation  Description: Interventions:  - Develop a trusting relationship   - Encourage socialization   Outcome: Progressing  Goal: Attend and participate in unit activities, including therapeutic, recreational, and educational groups  Description: Interventions:  - Provide therapeutic and educational activities daily, encourage attendance and participation, and document same in the medical record   Outcome: Progressing Problem: SELF HARM/SUICIDALITY  Goal: Will have no self-injury during hospital stay  Description: INTERVENTIONS:  - Q 15 MINUTES: Routine safety checks  - Q WAKING SHIFT & PRN: Assess risk to determine if routine checks are adequate to maintain patient safety  - Encourage patient to participate actively in care by formulating a plan to combat response to suicidal ideation, identify supports and resources  Outcome: Progressing     Problem: DEPRESSION  Goal: Will be euthymic at discharge  Description: INTERVENTIONS:  - Administer medication as ordered  - Provide emotional support via 1:1 interaction with staff  - Encourage involvement in milieu/groups/activities  - Monitor for social isolation  Outcome: Progressing     Problem: PSYCHOSIS  Goal: Will report no hallucinations or delusions  Description: Interventions:  - Administer medication as  ordered  - Every waking shifts and PRN assess for the presence of hallucinations and or delusions  - Assist with reality testing to support increasing orientation  - Assess if patient's hallucinations or delusions are encouraging self-harm or harm to others and intervene as appropriate  Outcome: Progressing     Problem: INVOLUNTARY ADMIT  Goal: Will cooperate with staff recommendations and doctor's orders and will demonstrate appropriate behavior  Description: INTERVENTIONS:  - Treat underlying conditions and offer medication as ordered  - Educate regarding involuntary admission procedures and rules  - Utilize positive consistent limit setting strategies to support patient and staff safety  Outcome: Progressing 65

## 2022-11-04 NOTE — PROGRESS NOTES
[facility]  Progress Note Justin Pritchett 1990, 33 y o  male MRN: [id number]  Unit/Bed#: [facility] [place] 343-01 Encounter: [id number]  Primary Care Provider: No primary care provider on file  Date and time admitted to hospital: 10/26/2022  3:25 PM    Wrist laceration  Assessment & Plan  · Visually inspected lacerations on 11/01/22  · Bilateral lacerations appear to be well healed however there is visual evidence of retained suture in L wrist  No open areas or signs of infection  · Reached out to Dr Alisha Brock via tiger text who recommended to leave suture in place if not bothersome to patient  · Discussed assessment and plan with patient  He denies any physical discomfort, however he was concerned that the suture was purposely left in with mal intent  I reviewed the timeline from suture placement to removal and recommendations made by ortho  I assured patient that his wound was treated appropriately and given the severity of the laceration, the sutures required a lengthy amount of time for the wound to properly close  He expressed understanding and agrees to leave retained suture in place  · At this time, removal of retained suture will not be attempted  Nurse Coordination of Care Discussion: Discussed assessment and plan with primary RN    Discussions with Specialists or Other Care Team Provider: Dr Dharmesh Guillory and Discussions with Family / Patient: answered patients questions to the best of my abilities    Time Spent for Care: 30 minutes  More than 50% of total time spent on counseling and coordination of care as described above  Current Length of Stay: 9 day(s)    Code Status: Level 1 - Full Code      Subjective:   Patient concerned about retained suture and if it should be removed  He denies any physical discomfort, however he was concerned that the suture was purposely left in with mal intent   I reviewed the timeline from suture placement to removal and recommendations made by ortho  I assured patient that his wound was treated appropriately and given the severity of the laceration, the sutures required a lengthy amount of time for the wound to properly close  He expressed understanding and agrees to leave retained suture in place  Objective:     Vitals:   Temp (24hrs), Av 5 °F (36 4 °C), Min:97 1 °F (36 2 °C), Max:97 9 °F (36 6 °C)    Temp:  [97 1 °F (36 2 °C)-97 9 °F (36 6 °C)] 97 9 °F (36 6 °C)  HR:  [71-91] 71  Resp:  [16] 16  BP: (128-133)/(70-74) 133/70  SpO2:  [99 %] 99 %  Body mass index is 24 3 kg/m²  Physical Exam:     Physical Exam  Constitutional:       Appearance: Normal appearance  He is normal weight  Cardiovascular:      Pulses: Normal pulses  Skin:     General: Skin is warm and dry  Neurological:      Mental Status: He is alert  Psychiatric:         Mood and Affect: Mood normal          Behavior: Behavior normal            Additional Data:     Labs:                            * I Have Reviewed All Lab Data Listed Above  * Additional Pertinent Lab Tests Reviewed:  All Labs Within Last 24 Hours Reviewed    Imaging:    Imaging Reports Reviewed Today Include: No imaging reviewed today      Last 24 Hours Medication List:   Current Facility-Administered Medications   Medication Dose Route Frequency Provider Last Rate   • acetaminophen  650 mg Oral Q6H PRN Jonah Goodman MD     • aluminum-magnesium hydroxide-simethicone  30 mL Oral Q4H PRN Jonah Goodman MD     • artificial tear  1 application Both Eyes O1W PRN Jonah Goodman MD     • haloperidol lactate  2 5 mg Intramuscular Q6H PRN Max 4/day Jonah Goodman MD      And   • LORazepam  1 mg Intramuscular Q6H PRN Max 4/day Jonah Goodman MD      And   • benztropine  0 5 mg Intramuscular Q6H PRN Max 4/day Jonah Goodman MD     • haloperidol lactate  5 mg Intramuscular Q4H PRN Max 4/day Jonah Goodman MD      And   • LORazepam  2 mg Intramuscular Q4H PRN Max 4/day Jonah Goodman MD      And • benztropine  1 mg Intramuscular Q4H PRN Max 4/day Lina Hogan MD     • benztropine  1 mg Intramuscular Q4H PRN Max 6/day Lina Hogan MD     • benztropine  1 mg Oral Q4H PRN Max 6/day Lina Hogan MD     • desvenlafaxine succinate  50 mg Oral Daily Steff Perez MD     • hydrOXYzine HCL  50 mg Oral Q6H PRN Max 4/day Lina Hogan MD      Or   • diphenhydrAMINE  50 mg Intramuscular Q6H PRN Lina Hogan MD     • haloperidol  2 mg Oral Q4H PRN Max 6/day Lina Hogan MD     • haloperidol  5 mg Oral Q6H PRN Max 4/day Lina Hogan MD     • haloperidol  5 mg Oral Q4H PRN Max 4/day Lina Hogan MD     • hydrOXYzine HCL  100 mg Oral Q6H PRN Max 4/day Lina Hogan MD      Or   • LORazepam  2 mg Intramuscular Q6H PRN Lina Hogan MD     • hydrOXYzine HCL  25 mg Oral Q6H PRN Max 4/day Lina Hogan MD     • ibuprofen  200 mg Oral Q6H PRN Lina Hogan MD     • ibuprofen  400 mg Oral Q6H PRN Lina Hogan MD     • ibuprofen  600 mg Oral Q8H PRN Lina Hogan MD     • melatonin  3 mg Oral HS PRN Lina Hogan MD     • nicotine  14 mg Transdermal Daily Lina Hogan MD     • nicotine polacrilex  2 mg Oral Q2H PRN Leanne Carmichael PA-C     • OLANZapine  10 mg Oral Q3H PRN Max 3/day Lina Hogan MD      Or   • OLANZapine  10 mg Intramuscular Q3H PRN Max 3/day Lina Hogan MD     • OLANZapine  5 mg Oral Q3H PRN Max 6/day Lina Hogan MD      Or   • OLANZapine  5 mg Intramuscular Q3H PRN Max 6/day Lina Hogan MD     • OLANZapine  2 5 mg Oral Q3H PRN Max 8/day Lina Hogan MD     • polyethylene glycol  17 g Oral Daily PRN Lina Hogan MD     • propranolol  10 mg Oral Q8H PRN Lina Hogan MD     • psyllium  1 packet Oral Daily Lina Hogan MD     • senna-docusate sodium  1 tablet Oral Daily PRN Lina Hogan MD     • ziprasidone  40 mg Oral BID With Meals Negro Rm DO          Today, Patient Was Seen By: Leanne Carmichael      ** Please Note: Dictation voice to text software may have been used in the creation of this document   **

## 2022-11-04 NOTE — PROGRESS NOTES
Progress Note - Kaykay Hess Franklin 33 y o  male MRN: [id number]  Unit/Bed#: [facility] 343-01 Encounter: [id number]    Assessment/Plan   Principal Problem:    Major depression with psychotic features [facility])  Active Problems:    Wrist laceration    PTSD (post-traumatic stress disorder)    Medical clearance for psychiatric admission    Paranoia (psychosis) ([facility] [address] )    Recommended Treatment:   Continue Geodon 40 mg b i d  Restart Pristiq at 50 mg daily  Encourage group therapy, milieu therapy and occupational therapy  All current active medications have been reviewed  Encourage group therapy, milieu therapy and occupational therapy  Behavioral Health checks every 7 minutes  On 303 commitment    ----------------------------------------      Subjective:   Per nursing report, patient has been participating in group and has been generally pleasant and cooperative  He has been compliant with medications and continues to eat his meals  Patient reports today that he feels well  He states that he has had improved sleep and does feel that he is doing well on current dose of Geodon  He does find it difficult to trust people but has found that his Seroquel of individuals that he feels he can trust on the unit has expanded over the past few days  He hopes to participate in as many groups possible today  His mood is somewhat depressed and anxious  He requests to restart Pristiq to address his depression and anxiety  He does still appear slightly suspicious of others but has had improved interactions with staff  Today, he provides no written reports of his interactions with staff or other patients  He currently denies SI, HI, or AVH      Behavior over the last 24 hours:  improved  Sleep: normal  Appetite: normal  Medication side effects: No  ROS: all other systems are negative    Mental Status Evaluation:  Appearance:  dressed appropriately, marginal hygiene, dressed in hospital attire   Behavior:  pleasant, cooperative, calm, still at times bizarre, somewhat guarded   Speech:  normal rate and volume   Mood:  depressed, anxious   Affect:  constricted   Thought Process:  goal directed, linear   Associations: concrete associations   Thought Content:  some paranoia   Perceptual Disturbances: denies auditory or visual hallucinations when asked, does not appear responding to internal stimuli   Risk Potential: Suicidal ideation - None  Homicidal ideation - None  Potential for aggression - Not at present   Sensorium:  oriented to person, place and time/date   Memory:  recent and remote memory grossly intact   Consciousness:  alert and awake   Attention/Concentration: attention span and concentration are age appropriate   Insight:  limited   Judgment: limited   Gait/Station: normal gait/station   Motor Activity: no abnormal movements     Medications: all current active meds have been reviewed    Current Facility-Administered Medications   Medication Dose Route Frequency Provider Last Rate   • acetaminophen  650 mg Oral Q6H PRN Panfilo Gomez MD     • aluminum-magnesium hydroxide-simethicone  30 mL Oral Q4H PRN Panfilo Gomez MD     • artificial tear  1 application Both Eyes K7I PRN Panfilo Gomez MD     • haloperidol lactate  2 5 mg Intramuscular Q6H PRN Max 4/day Panfilo Gomez MD      And   • LORazepam  1 mg Intramuscular Q6H PRN Max 4/day Panfilo Gomez MD      And   • benztropine  0 5 mg Intramuscular Q6H PRN Max 4/day Panfilo Gomez MD     • haloperidol lactate  5 mg Intramuscular Q4H PRN Max 4/day Panfilo Gomez MD      And   • LORazepam  2 mg Intramuscular Q4H PRN Max 4/day Panfilo Gomez MD      And   • benztropine  1 mg Intramuscular Q4H PRN Max 4/day Panfilo Gomez MD     • benztropine  1 mg Intramuscular Q4H PRN Max 6/day Panfilo Gomez MD     • benztropine  1 mg Oral Q4H PRN Max 6/day Panfilo Gomez MD     • desvenlafaxine succinate  50 mg Oral Daily Erica Reaves MD     • hydrOXYzine HCL  50 mg Oral Q6H PRN Max 4/day Panfilo Gomez MD      Or • diphenhydrAMINE  50 mg Intramuscular Q6H PRN Carrol Martinez MD     • haloperidol  2 mg Oral Q4H PRN Max 6/day Carrol Martinez MD     • haloperidol  5 mg Oral Q6H PRN Max 4/day Carorl Martinez MD     • haloperidol  5 mg Oral Q4H PRN Max 4/day Carrol Martinez MD     • hydrOXYzine HCL  100 mg Oral Q6H PRN Max 4/day Carrol Martinez MD      Or   • LORazepam  2 mg Intramuscular Q6H PRN Carrol Martinez MD     • hydrOXYzine HCL  25 mg Oral Q6H PRN Max 4/day Carrol Martinez MD     • ibuprofen  200 mg Oral Q6H PRN Carrol Martinez MD     • ibuprofen  400 mg Oral Q6H PRN Carrol Martinez MD     • ibuprofen  600 mg Oral Q8H PRN Carrol Martinez MD     • melatonin  3 mg Oral HS PRN Carrol Martinez MD     • nicotine  14 mg Transdermal Daily Carrol Martinez MD     • nicotine polacrilex  2 mg Oral Q2H PRN Cresencio Dunn PA-C     • OLANZapine  10 mg Oral Q3H PRN Max 3/day Carrol Martinez MD      Or   • OLANZapine  10 mg Intramuscular Q3H PRN Max 3/day Carrol Martinez MD     • OLANZapine  5 mg Oral Q3H PRN Max 6/day Carrol Martinez MD      Or   • OLANZapine  5 mg Intramuscular Q3H PRN Max 6/day Carrol Martinez MD     • OLANZapine  2 5 mg Oral Q3H PRN Max 8/day Carrol Martinez MD     • polyethylene glycol  17 g Oral Daily PRN Carrol Martinez MD     • propranolol  10 mg Oral Q8H PRN Carrol Martinez MD     • psyllium  1 packet Oral Daily Carrol Martinez MD     • senna-docusate sodium  1 tablet Oral Daily PRN Carrol Martinez MD     • ziprasidone  40 mg Oral BID With Meals Chele Mas DO         Labs:  I have personally reviewed all pertinent laboratory/tests results  Most Recent Labs:   Lab Results   Component Value Date    WBC 5 73 10/17/2022    RBC 4 04 10/17/2022    HGB 12 6 10/17/2022    HCT 38 4 10/17/2022     10/17/2022    RDW 12 2 10/17/2022    NEUTROABS 2 89 10/17/2022    SODIUM 137 10/18/2022    K 4 0 10/18/2022     10/18/2022    CO2 29 10/18/2022    BUN 7 10/18/2022    CREATININE 0 70 10/18/2022    GLUC 100 (H) 10/18/2022    CALCIUM 9 1 10/18/2022    AST 37 10/18/2022    ALT 44 10/18/2022    ALKPHOS 44 10/18/2022    TP 7 2 10/18/2022    ALB 4 4 10/18/2022    TBILI 0 77 10/18/2022    CHOLESTEROL 161 10/15/2022    HDL 34 (L) 10/15/2022    TRIG 104 10/15/2022    LDLCALC 106 10/15/2022    [place] 127 10/15/2022    [id number] 3 290 10/15/2022    RPR Non-Reactive 10/15/2022       Progress Toward Goals: progressing    Risks / Benefits of Treatment:    Risks, benefits, and possible side effects of medications explained to patient and patient verbalizes understanding and agreement for treatment  Counseling / Coordination of Care:    Patient's progress discussed with staff in treatment team meeting  Medications, treatment progress and treatment plan reviewed with patient      Charlette Berry MD 11/04/22

## 2022-11-04 NOTE — PROGRESS NOTES
11/04/22 0845   Team Meeting   Meeting Type Daily Rounds   Team Members Present   Team Members Present Physician;Nurse;   Physician Team Member [facility]   Nursing Team Member [facility] Management Team Member [place]   Patient/Family Present   Patient Present No   Patient's Family Present No   Pt guarded  Pt superficial  Med/meal compliant  Pt attending groups  Appears less irritable  Remains paranoid and suspicious  DC tbd

## 2022-11-05 RX ADMIN — NICOTINE 14 MG: 14 PATCH, EXTENDED RELEASE TRANSDERMAL at 09:24

## 2022-11-05 RX ADMIN — ZIPRASIDONE HYDROCHLORIDE 40 MG: 40 CAPSULE ORAL at 09:23

## 2022-11-05 RX ADMIN — DESVENLAFAXINE 50 MG: 50 TABLET, FILM COATED, EXTENDED RELEASE ORAL at 09:23

## 2022-11-05 RX ADMIN — ZIPRASIDONE HYDROCHLORIDE 40 MG: 40 CAPSULE ORAL at 17:36

## 2022-11-05 NOTE — NURSING NOTE
Pt visible on the unit this evening, appropriate with staff and peers  Pt cooperative, appears more trusting and conversational with staff, pleasant during interactions  Denies SI/HI/AVH at this time  Q7 minute safety checks maintained

## 2022-11-05 NOTE — NURSING NOTE
Patient reports pain in left forearm  He also states it feels like there is pressure on it    He is asking for a medical/ortho consult

## 2022-11-05 NOTE — PROGRESS NOTES
Progress Note - Kaykay Rowandisha Reid 33 y o  male MRN: [id number]  Unit/Bed#: [facility] 343-01 Encounter: @CSN        Assessment/Plan   Principal Problem:    Major depression with psychotic features [facility])  Active Problems:    Wrist laceration    PTSD (post-traumatic stress disorder)    Medical clearance for psychiatric admission    Paranoia (psychosis) ([facility] [address] )      Subjective: The patient was seen today for continuing care and reviewed with treatment team   Chart reviewed  He slept through the night  No management issues reported by the staff overnight    Roberto Maloney today reports that he worked out with the psychiatrist yesterday and he feels that his current medication does has been helpful  He feels that he is making progress  Today he rates his depression as 3/10 in severity, anxiety between 4 to 5/10 in severity, 10 being the worst   He feels increasing the dose of Geodon has helped  He did not like the Seroquel  He feels that his mood is well balanced at this time  He feels he has been more hopeful and positive and interacting more with with others  He denies having any perceptual disturbances  No delusions elicited at this time  Denies having any SI or HI  He has been tolerating the medication well and would like to continue the same at this time      Current Medications:  Current Facility-Administered Medications   Medication Dose Route Frequency Provider Last Rate   • acetaminophen  650 mg Oral Q6H PRN Melva León MD     • aluminum-magnesium hydroxide-simethicone  30 mL Oral Q4H PRN Melva León MD     • artificial tear  1 application Both Eyes Y7I PRN Melva León MD     • haloperidol lactate  2 5 mg Intramuscular Q6H PRN Max 4/day Melva León MD      And   • LORazepam  1 mg Intramuscular Q6H PRN Max 4/day Melva León MD      And   • benztropine  0 5 mg Intramuscular Q6H PRN Max 4/day Melva León MD     • haloperidol lactate  5 mg Intramuscular Q4H PRN Max 4/day Melva León MD      And   • LORazepam  2 mg Intramuscular Q4H PRN Max 4/day Shanelle Loving MD      And   • benztropine  1 mg Intramuscular Q4H PRN Max 4/day Shanelle Loving MD     • benztropine  1 mg Intramuscular Q4H PRN Max 6/day Shanelle Loving MD     • benztropine  1 mg Oral Q4H PRN Max 6/day Shanelle Loving MD     • desvenlafaxine succinate  50 mg Oral Daily Markus Schneider MD     • hydrOXYzine HCL  50 mg Oral Q6H PRN Max 4/day Shanelle Loving MD      Or   • diphenhydrAMINE  50 mg Intramuscular Q6H PRN Shanelle Loving MD     • haloperidol  2 mg Oral Q4H PRN Max 6/day Shanelle Loving MD     • haloperidol  5 mg Oral Q6H PRN Max 4/day Shanelle Loving MD     • haloperidol  5 mg Oral Q4H PRN Max 4/day Shanelle Loving MD     • hydrOXYzine HCL  100 mg Oral Q6H PRN Max 4/day Shanelle Loving MD      Or   • LORazepam  2 mg Intramuscular Q6H PRN Shanelle Loving MD     • hydrOXYzine HCL  25 mg Oral Q6H PRN Max 4/day Shanelle Loving MD     • ibuprofen  200 mg Oral Q6H PRN Shanelle Loving MD     • ibuprofen  400 mg Oral Q6H PRN Shanelle Loving MD     • ibuprofen  600 mg Oral Q8H PRN Shanelle Loving MD     • melatonin  3 mg Oral HS PRN Shanelle Loving MD     • nicotine  14 mg Transdermal Daily Shanelle Loving MD     • nicotine polacrilex  2 mg Oral Q2H PRN Farida Johansen PA-C     • OLANZapine  10 mg Oral Q3H PRN Max 3/day Shanelle Loving MD      Or   • OLANZapine  10 mg Intramuscular Q3H PRN Max 3/day Shanelle Loving MD     • OLANZapine  5 mg Oral Q3H PRN Max 6/day Shanelle Loving MD      Or   • OLANZapine  5 mg Intramuscular Q3H PRN Max 6/day Shanelle Loving MD     • OLANZapine  2 5 mg Oral Q3H PRN Max 8/day Shanelle Loving MD     • polyethylene glycol  17 g Oral Daily PRN Shanelle Loving MD     • propranolol  10 mg Oral Q8H PRN Shanelle Loving MD     • psyllium  1 packet Oral Daily Shanelle Loving MD     • senna-docusate sodium  1 tablet Oral Daily PRN Shanelle Loving MD     • ziprasidone  40 mg Oral BID With Meals Magaly Sharif,          Behavioral Health Medications: all current active meds have been reviewed and continue current psychiatric medications  Vital signs in last 24 hours:  Temp:  [97 4 °F (36 3 °C)-97 5 °F (36 4 °C)] 97 4 °F (36 3 °C)  HR:  [64-77] 64  Resp:  [16] 16  BP: (130-131)/(58-67) 130/67    Laboratory results:    I have personally reviewed all pertinent laboratory/tests results    Most Recent Labs:   Lab Results   Component Value Date    WBC 5 73 10/17/2022    RBC 4 04 10/17/2022    HGB 12 6 10/17/2022    HCT 38 4 10/17/2022     10/17/2022    RDW 12 2 10/17/2022    NEUTROABS 2 89 10/17/2022    SODIUM 137 10/18/2022    K 4 0 10/18/2022     10/18/2022    CO2 29 10/18/2022    BUN 7 10/18/2022    CREATININE 0 70 10/18/2022    GLUC 100 (H) 10/18/2022    CALCIUM 9 1 10/18/2022    AST 37 10/18/2022    ALT 44 10/18/2022    ALKPHOS 44 10/18/2022    TP 7 2 10/18/2022    ALB 4 4 10/18/2022    TBILI 0 77 10/18/2022    CHOLESTEROL 161 10/15/2022    HDL 34 (L) 10/15/2022    TRIG 104 10/15/2022    LDLCALC 106 10/15/2022    [place] 127 10/15/2022    [id number] 3 290 10/15/2022    RPR Non-Reactive 10/15/2022       Psychiatric Review of Systems:  Behavior over the last 24 hours: improving  Sleep: normal  Appetite: normal  Medication side effects: No  ROS: no complaints, all other systems negative    Mental Status Evaluation:  Appearance:  Age-appropriate, in casual clothes, long hair and and bearded, wearing glasses, has healed stitches on b/l wrists   Behavior:  cooperative superficially   Speech:  normal pitch and normal volume   Mood:  anxious and depressed   Affect:  constricted   Thought Process:  goal directed and logical   Thought Content:  no delusions elicited   Perceptual Disturbances: None   Risk Potential: Suicidal Ideations none, Homicidal Ideations none and Potential for Aggression No   Passive death wishes improve itd   Sensorium:  person, place, time/date and situation   Consciousness:  alert and awake    Insight:  limited    Judgment: limited   Gait/Station: normal gait/station   Motor Activity: no abnormal movements       Progress Toward Goals:  Progressing slowly  Tolerating medications well  Continue with inpatient stabilization at this time  Recommended Treatment: 1  Continue with group therapy, milieu therapy and occupational therapy     2 Continue following current medications:   Current Facility-Administered Medications   Medication Dose Route Frequency Provider Last Rate   • acetaminophen  650 mg Oral Q6H PRN Doracs Heredia MD     • aluminum-magnesium hydroxide-simethicone  30 mL Oral Q4H PRN Dorcas Heredia MD     • artificial tear  1 application Both Eyes K1C PRN Dorcas Heredia MD     • haloperidol lactate  2 5 mg Intramuscular Q6H PRN Max 4/day Dorcas Heredia MD      And   • LORazepam  1 mg Intramuscular Q6H PRN Max 4/day Dorcas Heredia MD      And   • benztropine  0 5 mg Intramuscular Q6H PRN Max 4/day Dorcas Heredia MD     • haloperidol lactate  5 mg Intramuscular Q4H PRN Max 4/day Dorcas Heredia MD      And   • LORazepam  2 mg Intramuscular Q4H PRN Max 4/day Dorcas Heredia MD      And   • benztropine  1 mg Intramuscular Q4H PRN Max 4/day Dorcas Heredia MD     • benztropine  1 mg Intramuscular Q4H PRN Max 6/day Dorcas Heredia MD     • benztropine  1 mg Oral Q4H PRN Max 6/day Dorcas Heredia MD     • desvenlafaxine succinate  50 mg Oral Daily Tabitha Evans MD     • hydrOXYzine HCL  50 mg Oral Q6H PRN Max 4/day Dorcas Heredia MD      Or   • diphenhydrAMINE  50 mg Intramuscular Q6H PRN Dorcas Heredia MD     • haloperidol  2 mg Oral Q4H PRN Max 6/day Dorcas Heredia MD     • haloperidol  5 mg Oral Q6H PRN Max 4/day Dorcas Heredia MD     • haloperidol  5 mg Oral Q4H PRN Max 4/day Dorcas Heredia MD     • hydrOXYzine HCL  100 mg Oral Q6H PRN Max 4/day Dorcas Heredia MD      Or   • LORazepam  2 mg Intramuscular Q6H PRN Dorcas Heredia MD     • hydrOXYzine HCL  25 mg Oral Q6H PRN Max 4/day Dorcas Heredia MD     • ibuprofen  200 mg Oral Q6H PRN Dorcas Heredia MD     • ibuprofen  400 mg Oral Q6H PRN Dorcas Heredia MD     • ibuprofen  600 mg Oral Q8H PRN Minerva Hawkins Kai Powers MD     • melatonin  3 mg Oral HS PRN Jeannette Jovel MD     • nicotine  14 mg Transdermal Daily Jeannette Jovel MD     • nicotine polacrilex  2 mg Oral Q2H PRN Camron Mathews PA-C     • OLANZapine  10 mg Oral Q3H PRN Max 3/day Jeannette Jovel MD      Or   • OLANZapine  10 mg Intramuscular Q3H PRN Max 3/day Jeannette Jovel MD     • OLANZapine  5 mg Oral Q3H PRN Max 6/day Jeannette Jovel MD      Or   • OLANZapine  5 mg Intramuscular Q3H PRN Max 6/day Jeannette Jovel MD     • OLANZapine  2 5 mg Oral Q3H PRN Max 8/day Jeannette Jovel MD     • polyethylene glycol  17 g Oral Daily PRN Jeannette Jovel MD     • propranolol  10 mg Oral Q8H PRN Jeannette Jovel MD     • psyllium  1 packet Oral Daily Jeannette Jovel MD     • senna-docusate sodium  1 tablet Oral Daily PRN Jeannette Jovel MD     • ziprasidone  40 mg Oral BID With Meals Leisa Roberson, DO         Risks, benefits and possible side effects of Medications:   Risks, benefits, and possible side effects of medications explained to patient and patient verbalizes understanding  This note has been constructed using a voice recognition system  Occasional wrong word or "sound a like" substitutions may have occurred due to the inherent limitations of voice recognition software  There may be translation, syntax,  or grammatical errors  If you have any questions, please contact the dictating provider      Katie Fairchild  11/05/22

## 2022-11-05 NOTE — NURSING NOTE
Patient cooperative, social with staff and peers  Visible on unit  Denying SI/HI/AVH at this time  No behavioral issues noted  Med compliant

## 2022-11-05 NOTE — NURSING NOTE
Patient has been out on the unit and social with select patients  Patient med/meal compliant  Patient rated his depression 3/10 and anxiety 3/10, stating "it's not much "  He has been pleasant and cooperative  He denies A/V hallucinations  Also brightens on approach

## 2022-11-06 RX ADMIN — PSYLLIUM HUSK 1 PACKET: 3.4 POWDER ORAL at 09:01

## 2022-11-06 RX ADMIN — NICOTINE 14 MG: 14 PATCH, EXTENDED RELEASE TRANSDERMAL at 08:59

## 2022-11-06 RX ADMIN — DESVENLAFAXINE 50 MG: 50 TABLET, FILM COATED, EXTENDED RELEASE ORAL at 08:58

## 2022-11-06 RX ADMIN — ZIPRASIDONE HYDROCHLORIDE 40 MG: 40 CAPSULE ORAL at 08:59

## 2022-11-06 RX ADMIN — ZIPRASIDONE HYDROCHLORIDE 40 MG: 40 CAPSULE ORAL at 16:15

## 2022-11-06 NOTE — NURSING NOTE
Patient visible on the unit throughout the evening  Cooperative with routine  Denied any unmet needs  No scheduled HS medications and no PRNs needed  Retreated to his room for sleep without issue  Will monitor

## 2022-11-06 NOTE — PLAN OF CARE
Problem: Alteration in Thoughts and Perception  Goal: Treatment Goal: Gain control of psychotic behaviors/thinking, reduce/eliminate presenting symptoms and demonstrate improved reality functioning upon discharge  11/6/2022 1104 by El Schwab RN  Outcome: Progressing  11/6/2022 0820 by El Schwab RN  Outcome: Progressing  Goal: Refrain from acting on delusional thinking/internal stimuli  Description: Interventions:  - Monitor patient closely, per order   - Utilize least restrictive measures   - Set reasonable limits, give positive feedback for acceptable   - Administer medications as ordered and monitor of potential side effects  11/6/2022 1104 by El Schwab RN  Outcome: Progressing  11/6/2022 0820 by El Schwab RN  Outcome: Progressing  Goal: Agree to be compliant with medication regime, as prescribed and report medication side effects  Description: Interventions:  - Offer appropriate PRN medication and supervise ingestion; conduct AIMS, as needed   11/6/2022 1104 by El Schwab RN  Outcome: Progressing  11/6/2022 0820 by El Schwab RN  Outcome: Progressing     Problem: Depression  Goal: Treatment Goal: Demonstrate behavioral control of depressive symptoms, verbalize feelings of improved mood/affect, and adopt new coping skills prior to discharge  11/6/2022 1104 by El Schwab RN  Outcome: Progressing  11/6/2022 0820 by El Schwab RN  Outcome: Progressing  Goal: Verbalize thoughts and feelings  Description: Interventions:  - Assess and re-assess patient's level of risk   - Engage patient in 1:1 interactions, daily, for a minimum of 15 minutes   - Encourage patient to express feelings, fears, frustrations, hopes   11/6/2022 1104 by El Schwab RN  Outcome: Progressing  11/6/2022 0820 by El Schwab RN  Outcome: Progressing  Goal: Refrain from harming self  Description: Interventions:  - Monitor patient closely, per order   - Supervise medication ingestion, monitor effects and side effects   11/6/2022 1104 by Sang Kat RN  Outcome: Progressing  11/6/2022 0820 by Sang Kat RN  Outcome: Progressing  Goal: Refrain from isolation  Description: Interventions:  - Develop a trusting relationship   - Encourage socialization   11/6/2022 1104 by Sang Kat RN  Outcome: Progressing  11/6/2022 0820 by Sang Kat RN  Outcome: Progressing  Goal: Attend and participate in unit activities, including therapeutic, recreational, and educational groups  Description: Interventions:  - Provide therapeutic and educational activities daily, encourage attendance and participation, and document same in the medical record   11/6/2022 1104 by Sang Kat RN  Outcome: Progressing  11/6/2022 0820 by Sang Kat RN  Outcome: Progressing     Problem: SELF HARM/SUICIDALITY  Goal: Will have no self-injury during hospital stay  Description: INTERVENTIONS:  - Q 15 MINUTES: Routine safety checks  - Q WAKING SHIFT & PRN: Assess risk to determine if routine checks are adequate to maintain patient safety  - Encourage patient to participate actively in care by formulating a plan to combat response to suicidal ideation, identify supports and resources  11/6/2022 1104 by Sang Kat RN  Outcome: Progressing  11/6/2022 0820 by Sang Kat RN  Outcome: Progressing     Problem: DEPRESSION  Goal: Will be euthymic at discharge  Description: INTERVENTIONS:  - Administer medication as ordered  - Provide emotional support via 1:1 interaction with staff  - Encourage involvement in milieu/groups/activities  - Monitor for social isolation  11/6/2022 1104 by Sang Kat RN  Outcome: Progressing  11/6/2022 0820 by Sang Kat RN  Outcome: Progressing     Problem: PSYCHOSIS  Goal: Will report no hallucinations or delusions  Description: Interventions:  - Administer medication as  ordered  - Every waking shifts and PRN assess for the presence of hallucinations and or delusions  - Assist with reality testing to support increasing orientation  - Assess if patient's hallucinations or delusions are encouraging self-harm or harm to others and intervene as appropriate  11/6/2022 1104 by Geraldine Ospina RN  Outcome: Progressing  11/6/2022 0820 by Geraldine Ospina RN  Outcome: Progressing     Problem: INVOLUNTARY ADMIT  Goal: Will cooperate with staff recommendations and doctor's orders and will demonstrate appropriate behavior  Description: INTERVENTIONS:  - Treat underlying conditions and offer medication as ordered  - Educate regarding involuntary admission procedures and rules  - Utilize positive consistent limit setting strategies to support patient and staff safety  11/6/2022 1104 by Geraldine Ospina RN  Outcome: Progressing  11/6/2022 0820 by Geraldine Ospina RN  Outcome: Progressing     Problem: Ineffective Coping  Goal: Participates in unit activities  Description: Interventions:  - Provide therapeutic environment   - Provide required programming   - Redirect inappropriate behaviors   11/6/2022 1104 by Geraldine Ospina RN  Outcome: Progressing  11/6/2022 0820 by Geraldine Ospina RN  Outcome: Progressing     Problem: DISCHARGE PLANNING  Goal: Discharge to home or other facility with appropriate resources  Description: INTERVENTIONS:  - Identify barriers to discharge w/patient and caregiver  - Arrange for needed discharge resources and transportation as appropriate  - Identify discharge learning needs (meds, wound care, etc )  - Arrange for interpretive services to assist at discharge as needed  - Refer to Case Management Department for coordinating discharge planning if the patient needs post-hospital services based on physician/advanced practitioner order or complex needs related to functional status, cognitive ability, or social support system  11/6/2022 1104 by Geraldine Ospina RN  Outcome: Progressing  11/6/2022 0820 by Geraldine Ospina RN  Outcome: Progressing

## 2022-11-06 NOTE — NURSING NOTE
Patient has been visible on the unit, med/meal compliant, and pleasant  Patient denies all signs, and symptoms, and is cooperative with unit protocols

## 2022-11-06 NOTE — PROGRESS NOTES
[facility] Group Note     11/06/22 1430   Activity/Group Checklist   Group Life Skills  (Teamwork and Communication)   Attendance Attended   Attendance Duration (min) 16-30  (in and out of group)   Interactions Interacted appropriately  (did not participate in activity, but offered encouragement to peers)   Affect/Mood Appropriate;Calm   Goals Achieved Able to listen to others; Able to engage in interactions; Able to recieve feedback; Able to give feedback to another

## 2022-11-06 NOTE — PROGRESS NOTES
Progress Note - Guillermoclivegilmar Reid 33 y o  male MRN: [id number]  Unit/Bed#: [facility] 343-01 Encounter: @CSN        Assessment/Plan   Principal Problem:    Major depression with psychotic features [facility])  Active Problems:    Wrist laceration    PTSD (post-traumatic stress disorder)    Medical clearance for psychiatric admission    Paranoia (psychosis) ([facility] [address] )      Subjective: The patient was seen today for continuing care and reviewed with treatment team   Chart reviewed  No management issues reported by staff overnight  Talya Gary today reports that he continues to make progress  He feels both his anxiety and depression has improved by appoint since yesterday  Rates his depression as 2/10 in severity and anxiety as 4/10 in severity, 10 being the worst   He is happy with the current medication at this time  Terms his mood as “regular”  He also reports that his thought process are getting more regulated  He still has occasional self depreciating thoughts which he hopes will continue to improve  He is tolerating the medications well and would like to continue the same dose at this time  Denies any perceptual disturbances  No delusions elicited at this time  Denies any SI or HI  He slept through the night      Current Medications:  Current Facility-Administered Medications   Medication Dose Route Frequency Provider Last Rate   • acetaminophen  650 mg Oral Q6H PRN Sue Vasques MD     • aluminum-magnesium hydroxide-simethicone  30 mL Oral Q4H PRN Sue Vasques MD     • artificial tear  1 application Both Eyes M6H PRN Sue Vasques MD     • haloperidol lactate  2 5 mg Intramuscular Q6H PRN Max 4/day Sue Vasques MD      And   • LORazepam  1 mg Intramuscular Q6H PRN Max 4/day Sue Vasques MD      And   • benztropine  0 5 mg Intramuscular Q6H PRN Max 4/day Sue Vasques MD     • haloperidol lactate  5 mg Intramuscular Q4H PRN Max 4/day Sue Vasques MD      And   • LORazepam  2 mg Intramuscular Q4H PRN Max 4/day Wilber Vargas MD      And   • benztropine  1 mg Intramuscular Q4H PRN Max 4/day Wilber Vargas MD     • benztropine  1 mg Intramuscular Q4H PRN Max 6/day Wilber Vargas MD     • benztropine  1 mg Oral Q4H PRN Max 6/day Wilber Vargas MD     • desvenlafaxine succinate  50 mg Oral Daily Marjorie Gagnon MD     • hydrOXYzine HCL  50 mg Oral Q6H PRN Max 4/day Wilber Vargas MD      Or   • diphenhydrAMINE  50 mg Intramuscular Q6H PRN Wilber Vargas MD     • haloperidol  2 mg Oral Q4H PRN Max 6/day Wilber Vargas MD     • haloperidol  5 mg Oral Q6H PRN Max 4/day Wilber Vargas MD     • haloperidol  5 mg Oral Q4H PRN Max 4/day Wilber Vargas MD     • hydrOXYzine HCL  100 mg Oral Q6H PRN Max 4/day Wilber Vargas MD      Or   • LORazepam  2 mg Intramuscular Q6H PRN Wilber Vargas MD     • hydrOXYzine HCL  25 mg Oral Q6H PRN Max 4/day Wilber Vargas MD     • ibuprofen  200 mg Oral Q6H PRN Wilber Vargas MD     • ibuprofen  400 mg Oral Q6H PRN Wilber Vargas MD     • ibuprofen  600 mg Oral Q8H PRN Wilber Vargas MD     • melatonin  3 mg Oral HS PRN Wilber Vargas MD     • nicotine  14 mg Transdermal Daily Wilber Vargas MD     • nicotine polacrilex  2 mg Oral Q2H PRN Winnie Vega PA-C     • OLANZapine  10 mg Oral Q3H PRN Max 3/day Wilber Vargas MD      Or   • OLANZapine  10 mg Intramuscular Q3H PRN Max 3/day Wilber Vargas MD     • OLANZapine  5 mg Oral Q3H PRN Max 6/day Wilber Vargas MD      Or   • OLANZapine  5 mg Intramuscular Q3H PRN Max 6/day Wilber Vargas MD     • OLANZapine  2 5 mg Oral Q3H PRN Max 8/day Wilber Vargas MD     • polyethylene glycol  17 g Oral Daily PRN Wilber Vargas MD     • propranolol  10 mg Oral Q8H PRN Wilber Vargas MD     • psyllium  1 packet Oral Daily Wilber Vargas MD     • senna-docusate sodium  1 tablet Oral Daily PRN Wilber Vargas MD     • ziprasidone  40 mg Oral BID With Meals Carol , DO         Behavioral Health Medications: all current active meds have been reviewed and continue current psychiatric medications      Vital signs in last 24 hours: Temp:  [97 2 °F (36 2 °C)-97 8 °F (36 6 °C)] 97 2 °F (36 2 °C)  HR:  [59-70] 59  Resp:  [16] 16  BP: (121-137)/(61-64) 121/64    Laboratory results:    I have personally reviewed all pertinent laboratory/tests results  Most Recent Labs:   Lab Results   Component Value Date    WBC 5 73 10/17/2022    RBC 4 04 10/17/2022    HGB 12 6 10/17/2022    HCT 38 4 10/17/2022     10/17/2022    RDW 12 2 10/17/2022    NEUTROABS 2 89 10/17/2022    SODIUM 137 10/18/2022    K 4 0 10/18/2022     10/18/2022    CO2 29 10/18/2022    BUN 7 10/18/2022    CREATININE 0 70 10/18/2022    GLUC 100 (H) 10/18/2022    CALCIUM 9 1 10/18/2022    AST 37 10/18/2022    ALT 44 10/18/2022    ALKPHOS 44 10/18/2022    TP 7 2 10/18/2022    ALB 4 4 10/18/2022    TBILI 0 77 10/18/2022    CHOLESTEROL 161 10/15/2022    HDL 34 (L) 10/15/2022    TRIG 104 10/15/2022    [address] 106 10/15/2022    [place] 127 10/15/2022    [id number] 3 290 10/15/2022    RPR Non-Reactive 10/15/2022       Psychiatric Review of Systems:  Behavior over the last 24 hours: improving  Sleep: normal  Appetite: normal  Medication side effects: No  ROS: no complaints, all other systems negative    Mental Status Evaluation:  Appearance:  age appropriate and casually dressed   Behavior:  cooperative   Speech:  normal pitch and normal volume   Mood:  anxious and depressed   Affect:  mood-congruent   Thought Process:  logical   Thought Content:  no delusions elicited   Perceptual Disturbances: None   Risk Potential: Suicidal Ideations none, Homicidal Ideations none and Potential for Aggression No   Sensorium:  person, place, time/date and situation   Consciousness:  alert and awake    Insight:   limited    Judgment: limited   Gait/Station: normal gait/station   Motor Activity: no abnormal movements       Progress Toward Goals:  Progressing  Continue inpatient stabilization at this time  Recommended Treatment: 1  Continue with group therapy, milieu therapy and occupational therapy     2 Continue following current medications:   Current Facility-Administered Medications   Medication Dose Route Frequency Provider Last Rate   • acetaminophen  650 mg Oral Q6H PRN Jeffery Church MD     • aluminum-magnesium hydroxide-simethicone  30 mL Oral Q4H PRN Jeffery Church MD     • artificial tear  1 application Both Eyes L9O PRN Jeffery Church MD     • haloperidol lactate  2 5 mg Intramuscular Q6H PRN Max 4/day Jeffery Church MD      And   • LORazepam  1 mg Intramuscular Q6H PRN Max 4/day Jeffery Church MD      And   • benztropine  0 5 mg Intramuscular Q6H PRN Max 4/day Jeffery Church MD     • haloperidol lactate  5 mg Intramuscular Q4H PRN Max 4/day Jeffery Church MD      And   • LORazepam  2 mg Intramuscular Q4H PRN Max 4/day Jeffery Church MD      And   • benztropine  1 mg Intramuscular Q4H PRN Max 4/day Jeffery Church MD     • benztropine  1 mg Intramuscular Q4H PRN Max 6/day Jeffery Church MD     • benztropine  1 mg Oral Q4H PRN Max 6/day Jeffery Church MD     • desvenlafaxine succinate  50 mg Oral Daily Kaylan Lew MD     • hydrOXYzine HCL  50 mg Oral Q6H PRN Max 4/day Jeffery Church MD      Or   • diphenhydrAMINE  50 mg Intramuscular Q6H PRN Jeffery Church MD     • haloperidol  2 mg Oral Q4H PRN Max 6/day Jeffery Church MD     • haloperidol  5 mg Oral Q6H PRN Max 4/day Jeffery Church MD     • haloperidol  5 mg Oral Q4H PRN Max 4/day Jeffery Church MD     • hydrOXYzine HCL  100 mg Oral Q6H PRN Max 4/day Jeffery Church MD      Or   • LORazepam  2 mg Intramuscular Q6H PRN Jeffery Church MD     • hydrOXYzine HCL  25 mg Oral Q6H PRN Max 4/day Jeffery Church MD     • ibuprofen  200 mg Oral Q6H PRN Jeffery Church MD     • ibuprofen  400 mg Oral Q6H PRN Jeffery Church MD     • ibuprofen  600 mg Oral Q8H PRN Jeffery Church MD     • melatonin  3 mg Oral HS PRN Jeffery Church MD     • nicotine  14 mg Transdermal Daily Jeffery Church MD     • nicotine polacrilex  2 mg Oral Q2H PRN Yaquelin Miramontes PA-C     • OLANZapine  10 mg Oral Q3H PRN Max 3/day Curry Moss MD      Or   • OLANZapine  10 mg Intramuscular Q3H PRN Max 3/day Curry Moss MD     • OLANZapine  5 mg Oral Q3H PRN Max 6/day Curry Moss MD      Or   • OLANZapine  5 mg Intramuscular Q3H PRN Max 6/day Curry Moss MD     • OLANZapine  2 5 mg Oral Q3H PRN Max 8/day Curry Moss MD     • polyethylene glycol  17 g Oral Daily PRN Curry Moss MD     • propranolol  10 mg Oral Q8H PRN Curry Moss MD     • psyllium  1 packet Oral Daily Curry Moss MD     • senna-docusate sodium  1 tablet Oral Daily PRN Curry Moss MD     • ziprasidone  40 mg Oral BID With Meals Charlotte Thakkar DO         Risks, benefits and possible side effects of Medications:   Risks, benefits, and possible side effects of medications explained to patient and patient verbalizes understanding  This note has been constructed using a voice recognition system  Occasional wrong word or "sound a like" substitutions may have occurred due to the inherent limitations of voice recognition software  There may be translation, syntax,  or grammatical errors  If you have any questions, please contact the dictating provider      Elise Dean  11/06/22

## 2022-11-07 RX ORDER — LANOLIN ALCOHOL/MO/W.PET/CERES
3 CREAM (GRAM) TOPICAL
Status: DISCONTINUED | OUTPATIENT
Start: 2022-11-07 | End: 2022-11-09

## 2022-11-07 RX ADMIN — ZIPRASIDONE HYDROCHLORIDE 40 MG: 40 CAPSULE ORAL at 17:06

## 2022-11-07 RX ADMIN — DESVENLAFAXINE 50 MG: 50 TABLET, FILM COATED, EXTENDED RELEASE ORAL at 08:14

## 2022-11-07 RX ADMIN — PSYLLIUM HUSK 1 PACKET: 3.4 POWDER ORAL at 08:15

## 2022-11-07 RX ADMIN — ZIPRASIDONE HYDROCHLORIDE 40 MG: 40 CAPSULE ORAL at 08:14

## 2022-11-07 RX ADMIN — MELATONIN TAB 3 MG 3 MG: 3 TAB at 22:19

## 2022-11-07 RX ADMIN — NICOTINE POLACRILEX 2 MG: 2 GUM, CHEWING ORAL at 20:51

## 2022-11-07 RX ADMIN — NICOTINE 14 MG: 14 PATCH, EXTENDED RELEASE TRANSDERMAL at 08:14

## 2022-11-07 NOTE — PROGRESS NOTES
11/07/22 0930   Activity/Group Checklist   Group   (goals group)   Attendance Attended   Attendance Duration (min) 16-30   Interactions Interacted appropriately   Affect/Mood Appropriate   Goals Achieved Able to listen to others; Able to engage in interactions; Able to reflect/comment on own behavior;Able to self-disclose; Able to recieve feedback   After group patient noted he is doing a lot better  He was curious about resources in the [facility] book  Informed him group home placement is out for many months and his choice to go to [facility] was a good choice  Shared with him about the clubhouse in [place] which may be a good resource for him  He also question whether he could get his meds before he leaves  He does state he has FND and the card is in his belongings

## 2022-11-07 NOTE — PROGRESS NOTES
11/07/22 0748   Team Meeting   Meeting Type Daily Rounds   Team Members Present   Team Members Present Physician;Nurse;   Physician Team Member Mark [id number] [facility] Member Luis Alberto   Patient/Family Present   Patient Present No   Patient's Family Present No   Pt social with select peers  Rating depression 3/10  Pt pleasant and cooperative  Denying symptoms  Pt visible throughout evening  Pt attended group over the weekend  Discharge to be determined

## 2022-11-07 NOTE — NURSING NOTE
Patient visible intermittently throughout the evening  Cooperative with routine  Denied any unmet needs  No scheduled HS medications and no PRNs needed  Retreated to his room for sleep without issue  Will monitor

## 2022-11-07 NOTE — NURSING NOTE
Glo Long denies SI/HI/AH/VH  He reports some anxiety and depression but states "it is just a little bit"  Pt is brighter on approach  He is able to have his needs met  He is accepting of his medications  Mouth checks completed  He is visible and social with select peers  No behavioral issues to be reported  Will continue to monitor

## 2022-11-07 NOTE — PROGRESS NOTES
Progress Note - Guillermoclivegilmar Reid 33 y o  male MRN: [id number]  Unit/Bed#: [facility] 343-01 Encounter: [id number]    Assessment/Plan   Principal Problem:    Major depression with psychotic features [facility])  Active Problems:    Wrist laceration    PTSD (post-traumatic stress disorder)    Medical clearance for psychiatric admission    Paranoia (psychosis) ([facility])      • Continue Pristiq 50 mg daily for depressed mood  • Continue Geodon 40 mg BID for psychotic features   • Changed Melatonin 3 mg PRN to Melatonin 3 mg QHS   • Continue to promote patient participation in therapeutic milieu  • Continue medical management by primary team   • Discharge disposition: TBD     Subjective: The patient was evaluated this morning for continuity of care and no acute distress noted throughout the evaluation  Over the past 24 hours staff noted the patient was pleasant and cooperative with staff  Today on exam the patient was standing beside his bed with a smile on his face and reports his mood as "feeilng better, I am socializing more"  He reports improved sleep stating he got 5-6 hours of rested sleep  He denies any nightmares and is says he is getting better about not going to sleep with his nicotine patch on at night  He did request Melatonin 3 mg to be scheduled to help with insomnia, although he denies issues initiating or maintaining sleep  He describes his appetite and energy as good today  When asked about adverse effects he reports feeling drowsy, but states "it is tolerable and not bad"  He denies suicidal and homicidal ideation  He also denies visual and auditory hallucinations  When asked about his plans for today he reports "looking forward to going to groups  I am currently in a mindfulness group and I find it really helpful"       Current Medications:  Current Facility-Administered Medications   Medication Dose Route Frequency Provider Last Rate   • acetaminophen  650 mg Oral Q6H PRN Owen Osuna MD     • aluminum-magnesium hydroxide-simethicone  30 mL Oral Q4H PRN Saadia Caraballo MD     • artificial tear  1 application Both Eyes T0O PRN Saadia Caraballo MD     • haloperidol lactate  2 5 mg Intramuscular Q6H PRN Max 4/day Saadia Caraballo MD      And   • LORazepam  1 mg Intramuscular Q6H PRN Max 4/day Saadia Caraballo MD      And   • benztropine  0 5 mg Intramuscular Q6H PRN Max 4/day Saadia Caraballo MD     • haloperidol lactate  5 mg Intramuscular Q4H PRN Max 4/day Saadia Caraballo MD      And   • LORazepam  2 mg Intramuscular Q4H PRN Max 4/day Saadia Caraballo MD      And   • benztropine  1 mg Intramuscular Q4H PRN Max 4/day Saadia Caraballo MD     • benztropine  1 mg Intramuscular Q4H PRN Max 6/day Saadia Caraballo MD     • benztropine  1 mg Oral Q4H PRN Max 6/day Saadia Caraballo MD     • desvenlafaxine succinate  50 mg Oral Daily Salma Evans MD     • hydrOXYzine HCL  50 mg Oral Q6H PRN Max 4/day Saadia Caraballo MD      Or   • diphenhydrAMINE  50 mg Intramuscular Q6H PRN Saadia Caraballo MD     • haloperidol  2 mg Oral Q4H PRN Max 6/day Saadia Caraballo MD     • haloperidol  5 mg Oral Q6H PRN Max 4/day Saadia Caraballo MD     • haloperidol  5 mg Oral Q4H PRN Max 4/day Saadia Caraballo MD     • hydrOXYzine HCL  100 mg Oral Q6H PRN Max 4/day Saadia Caraballo MD      Or   • LORazepam  2 mg Intramuscular Q6H PRN Saadia Caraballo MD     • hydrOXYzine HCL  25 mg Oral Q6H PRN Max 4/day Saadia Caraballo MD     • ibuprofen  200 mg Oral Q6H PRN Saadia Caraballo MD     • ibuprofen  400 mg Oral Q6H PRN Saadia Caraballo MD     • ibuprofen  600 mg Oral Q8H PRN Saadia Caraballo MD     • melatonin  3 mg Oral HS Mayuri Mason MD     • nicotine  14 mg Transdermal Daily Saadia Caraballo MD     • nicotine polacrilex  2 mg Oral Q2H PRN Moira Aceves PA-C     • OLANZapine  10 mg Oral Q3H PRN Max 3/day Saadia Caraballo MD      Or   • OLANZapine  10 mg Intramuscular Q3H PRN Max 3/day Saadia Caraballo MD     • OLANZapine  5 mg Oral Q3H PRN Max 6/day Saadia Caraballo MD      Or   • OLANZapine  5 mg Intramuscular Q3H PRN Max 6/day Saadia Caraballo MD     • OLANZapine  2 5 mg Oral Q3H PRN Max 8/day Antonio Berry MD     • polyethylene glycol  17 g Oral Daily PRN Antonio Berry MD     • propranolol  10 mg Oral Q8H PRN Antonio Berry MD     • psyllium  1 packet Oral Daily Antonio Berry MD     • senna-docusate sodium  1 tablet Oral Daily PRN Antonio Berry MD     • ziprasidone  40 mg Oral BID With Meals Indu Villafana,          Behavioral Health Medications:   all current active meds have been reviewed, continue current psychiatric medications and current meds:   Current Facility-Administered Medications   Medication Dose Route Frequency   • acetaminophen (TYLENOL) tablet 650 mg  650 mg Oral Q6H PRN   • aluminum-magnesium hydroxide-simethicone (MYLANTA) oral suspension 30 mL  30 mL Oral Q4H PRN   • artificial tear (LUBRIFRESH P M ) ophthalmic ointment 1 application  1 application Both Eyes H1G PRN   • haloperidol lactate (HALDOL) injection 2 5 mg  2 5 mg Intramuscular Q6H PRN Max 4/day    And   • LORazepam (ATIVAN) injection 1 mg  1 mg Intramuscular Q6H PRN Max 4/day    And   • benztropine (COGENTIN) injection 0 5 mg  0 5 mg Intramuscular Q6H PRN Max 4/day   • haloperidol lactate (HALDOL) injection 5 mg  5 mg Intramuscular Q4H PRN Max 4/day    And   • LORazepam (ATIVAN) injection 2 mg  2 mg Intramuscular Q4H PRN Max 4/day    And   • benztropine (COGENTIN) injection 1 mg  1 mg Intramuscular Q4H PRN Max 4/day   • benztropine (COGENTIN) injection 1 mg  1 mg Intramuscular Q4H PRN Max 6/day   • benztropine (COGENTIN) tablet 1 mg  1 mg Oral Q4H PRN Max 6/day   • desvenlafaxine succinate (PRISTIQ) 24 hr tablet 50 mg  50 mg Oral Daily   • hydrOXYzine HCL (ATARAX) tablet 50 mg  50 mg Oral Q6H PRN Max 4/day    Or   • diphenhydrAMINE (BENADRYL) injection 50 mg  50 mg Intramuscular Q6H PRN   • haloperidol (HALDOL) tablet 2 mg  2 mg Oral Q4H PRN Max 6/day   • haloperidol (HALDOL) tablet 5 mg  5 mg Oral Q6H PRN Max 4/day   • haloperidol (HALDOL) tablet 5 mg  5 mg Oral Q4H PRN Max 4/day • hydrOXYzine HCL (ATARAX) tablet 100 mg  100 mg Oral Q6H PRN Max 4/day    Or   • LORazepam (ATIVAN) injection 2 mg  2 mg Intramuscular Q6H PRN   • hydrOXYzine HCL (ATARAX) tablet 25 mg  25 mg Oral Q6H PRN Max 4/day   • ibuprofen (MOTRIN) tablet 200 mg  200 mg Oral Q6H PRN   • ibuprofen (MOTRIN) tablet 400 mg  400 mg Oral Q6H PRN   • ibuprofen (MOTRIN) tablet 600 mg  600 mg Oral Q8H PRN   • melatonin tablet 3 mg  3 mg Oral HS   • nicotine (NICODERM CQ) 14 mg/24hr TD 24 hr patch 14 mg  14 mg Transdermal Daily   • nicotine polacrilex (NICORETTE) gum 2 mg  2 mg Oral Q2H PRN   • OLANZapine (ZyPREXA) tablet 10 mg  10 mg Oral Q3H PRN Max 3/day    Or   • OLANZapine (ZyPREXA) IM injection 10 mg  10 mg Intramuscular Q3H PRN Max 3/day   • OLANZapine (ZyPREXA) tablet 5 mg  5 mg Oral Q3H PRN Max 6/day    Or   • OLANZapine (ZyPREXA) IM injection 5 mg  5 mg Intramuscular Q3H PRN Max 6/day   • OLANZapine (ZyPREXA) tablet 2 5 mg  2 5 mg Oral Q3H PRN Max 8/day   • polyethylene glycol (MIRALAX) packet 17 g  17 g Oral Daily PRN   • propranolol (INDERAL) tablet 10 mg  10 mg Oral Q8H PRN   • psyllium (METAMUCIL) 1 packet  1 packet Oral Daily   • senna-docusate sodium (SENOKOT S) 8 6-50 mg per tablet 1 tablet  1 tablet Oral Daily PRN   • ziprasidone (GEODON) capsule 40 mg  40 mg Oral BID With Meals     Vital signs in last 24 hours:  Temp:  [97 4 °F (36 3 °C)-97 8 °F (36 6 °C)] 97 8 °F (36 6 °C)  HR:  [64-65] 64  Resp:  [16] 16  BP: (121-126)/(61-63) 121/63    Laboratory results:    I have personally reviewed all pertinent laboratory/tests results    Most Recent Labs:   Lab Results   Component Value Date    WBC 5 73 10/17/2022    RBC 4 04 10/17/2022    HGB 12 6 10/17/2022    HCT 38 4 10/17/2022     10/17/2022    RDW 12 2 10/17/2022    NEUTROABS 2 89 10/17/2022    SODIUM 137 10/18/2022    K 4 0 10/18/2022     10/18/2022    CO2 29 10/18/2022    BUN 7 10/18/2022    CREATININE 0 70 10/18/2022    GLUC 100 (H) 10/18/2022 CALCIUM 9 1 10/18/2022    AST 37 10/18/2022    ALT 44 10/18/2022    ALKPHOS 44 10/18/2022    TP 7 2 10/18/2022    ALB 4 4 10/18/2022    TBILI 0 77 10/18/2022    CHOLESTEROL 161 10/15/2022    HDL 34 (L) 10/15/2022    TRIG 104 10/15/2022    LDLCALC 106 10/15/2022    [place] 127 10/15/2022    [id number] 3 290 10/15/2022    RPR Non-Reactive 10/15/2022       Psychiatric Review of Systems:  Behavior over the last 24 hours:  improved  Sleep: improved, slept around 5-6 hours   Appetite: normal  Medication side effects: No   ROS: all other systems are negative    Mental Status Evaluation:    Appearance:  disheveled, looks older than stated age, bearded,  male   Behavior:  pleasant, cooperative, calm   Speech:  normal rate and volume, scant at times    Mood:  "Feeling better"   Affect:  Less constricted, brighter at times with appropriate laughter and smiles during the interview   Thought Process:  logical, coherent, goal directed   Associations: intact associations   Thought Content:  no overt delusions   Perceptual Disturbances: no auditory hallucinations, no visual hallucinations   Risk Potential: Suicidal ideation - None at present  Homicidal ideation - None at present  Potential for aggression - No   Sensorium:  oriented to person, place and time/date   Memory:  recent and remote memory grossly intact   Consciousness:  alert and awake   Attention/Concentration: attention span and concentration are age appropriate   Insight:  limited   Judgment: limited   Gait/Station: normal gait/station   Motor Activity: no abnormal movements         Progress Toward Goals: Progressing, patient is becoming more trustful of staff and peers  He is engaging in group therapies and reports benefits from them  He admits to socializing more and feeling happy about it     Recommended Treatment:   See above for assessment and plan       Risks, benefits and possible side effects of Medications:   Risks, benefits, and possible side effects of medications explained to patient and patient verbalizes understanding  This note has been constructed using a voice recognition system  There may be translation, syntax, or grammatical errors  If you have any questions, please contact the dictating provider      Josefina Camacho MD   Psychiatry PGY-1

## 2022-11-07 NOTE — PROGRESS NOTES
[facility] Group Note     11/07/22 1000   Activity/Group Checklist   Group Life Skills  (Mindfulness and Perspectives)   Attendance Attended   Attendance Duration (min) 46-60  (pulled by clinician)   Interactions Interacted appropriately   Affect/Mood Appropriate;Bright  (Focused; Motivated)   Goals Achieved Identified feelings; Discussed coping strategies; Able to listen to others; Able to engage in interactions; Able to reflect/comment on own behavior;Able to self-disclose; Able to recieve feedback; Able to give feedback to another  (Demonstrated Appropriate Insight)

## 2022-11-07 NOTE — PROGRESS NOTES
11/07/22 1300   Activity/Group Checklist   Group   (recovery group)   Attendance Attended   Attendance Duration (min) 46-60   Interactions Interacted appropriately   Affect/Mood Appropriate   Goals Achieved Discussed coping strategies; Discussed self-esteem issues; Displayed empathy;Able to listen to others; Able to engage in interactions; Able to self-disclose; Able to recieve feedback; Able to give feedback to another

## 2022-11-08 RX ADMIN — ZIPRASIDONE HYDROCHLORIDE 40 MG: 40 CAPSULE ORAL at 08:20

## 2022-11-08 RX ADMIN — MELATONIN TAB 3 MG 3 MG: 3 TAB at 21:12

## 2022-11-08 RX ADMIN — NICOTINE POLACRILEX 2 MG: 2 GUM, CHEWING ORAL at 08:21

## 2022-11-08 RX ADMIN — DESVENLAFAXINE 50 MG: 50 TABLET, FILM COATED, EXTENDED RELEASE ORAL at 08:20

## 2022-11-08 RX ADMIN — ZIPRASIDONE HYDROCHLORIDE 40 MG: 40 CAPSULE ORAL at 17:25

## 2022-11-08 RX ADMIN — PSYLLIUM HUSK 1 PACKET: 3.4 POWDER ORAL at 08:20

## 2022-11-08 RX ADMIN — NICOTINE 14 MG: 14 PATCH, EXTENDED RELEASE TRANSDERMAL at 08:20

## 2022-11-08 NOTE — PROGRESS NOTES
11/08/22 1300   Activity/Group Checklist   Group   (recovery group- relapse preveniton)   Attendance Attended   Attendance Duration (min) 46-60   Interactions Interacted appropriately   Affect/Mood Appropriate   Goals Achieved Identified relapse prevention strategies; Discussed coping strategies; Discussed self-esteem issues; Able to listen to others; Able to engage in interactions; Able to self-disclose; Able to recieve feedback; Able to give feedback to another

## 2022-11-08 NOTE — PROGRESS NOTES
11/08/22 0844   Team Meeting   Meeting Type Daily Rounds   Team Members Present   Team Members Present Physician;Nurse;   Physician Team Member [facility]   Nursing Team Member [facility] Management Team Member [place]   Patient/Family Present   Patient Present No   Patient's Family Present No   Pt more visible and social  Pt appears a bit brighter  DC poss Friday

## 2022-11-08 NOTE — PROGRESS NOTES
Progress Note - Kaykay Rowandisha Reid 33 y o  male MRN: [id number]  Unit/Bed#: [facility] 343-01 Encounter: [id number]    Assessment/Plan   Principal Problem:    Major depression with psychotic features [facility])  Active Problems:    Wrist laceration    PTSD (post-traumatic stress disorder)    Medical clearance for psychiatric admission    Paranoia (psychosis) ([facility])      • Continue Pristiq 50 mg daily for depressed mood  • Continue Geodon 40 mg BID for psychotic features   • Continue Melatonin 3 mg QHS   • Continue to promote patient participation in therapeutic milieu  • Continue medical management by primary team   • Discharge disposition: TBD, tentative plan for discharge 11/11/22    Subjective: The patient was evaluated this morning for continuity of care and no acute distress noted throughout the evaluation  Over the past 24 hours staff noted the patient was pleasant and cooperative with staff  He was observed being brighter and more social with peers  Today on exam the patient was sitting comfortably in his chair nd reports his mood as "feeling better"  He reports improved sleep stating he got around 7 hours of restful sleep  He describes his appetite and energy as good today  When asked about adverse effects he reports feeling less drowsy, and states "it is tolerable, I just feel a little less focused"  He denies suicidal and homicidal ideation  He also denies visual and auditory hallucinations  He tells the interviewer he has talked with his family and his mother believes he is doing better and he reports nothing but positive conversations with her  He did inquire about potential adverse effects from Geodon and it was discussed at length with the patient and he was satisfied with the answer  When asked about his plans on discharge he mentions that he is looking forward to the holidays and spending time with his family  He says he is looking forward to doing more social activities   When asked to further explain, he mentions that he would be interested in doing "yoga, playing sports, but no bars"  It was discussed with him that he should consider a softball league, and he was agreeable to do this and wrote down the name of a local league and expressed interest in joining it on discharge       Current Medications:  Current Facility-Administered Medications   Medication Dose Route Frequency Provider Last Rate   • acetaminophen  650 mg Oral Q6H PRN Stephanie Burch MD     • aluminum-magnesium hydroxide-simethicone  30 mL Oral Q4H PRN Stephanie Burch MD     • artificial tear  1 application Both Eyes Z8M PRN Stephanie Burch MD     • haloperidol lactate  2 5 mg Intramuscular Q6H PRN Max 4/day Stephanie Burch MD      And   • LORazepam  1 mg Intramuscular Q6H PRN Max 4/day Stephanie Burch MD      And   • benztropine  0 5 mg Intramuscular Q6H PRN Max 4/day Stephanie Burch MD     • haloperidol lactate  5 mg Intramuscular Q4H PRN Max 4/day Stephanie Burch MD      And   • LORazepam  2 mg Intramuscular Q4H PRN Max 4/day Stephanie Burch MD      And   • benztropine  1 mg Intramuscular Q4H PRN Max 4/day Stephanie Burch MD     • benztropine  1 mg Intramuscular Q4H PRN Max 6/day Stephanie Burch MD     • benztropine  1 mg Oral Q4H PRN Max 6/day Stephanie Burch MD     • desvenlafaxine succinate  50 mg Oral Daily Hermila Mcclellan MD     • hydrOXYzine HCL  50 mg Oral Q6H PRN Max 4/day Stephanie Burch MD      Or   • diphenhydrAMINE  50 mg Intramuscular Q6H PRN Stephanie Burch MD     • haloperidol  2 mg Oral Q4H PRN Max 6/day Stephanie Burch MD     • haloperidol  5 mg Oral Q6H PRN Max 4/day Stephanie Burch MD     • haloperidol  5 mg Oral Q4H PRN Max 4/day Stephanie Burch MD     • hydrOXYzine HCL  100 mg Oral Q6H PRN Max 4/day Stephanie Burch MD      Or   • LORazepam  2 mg Intramuscular Q6H PRN Stephanie Burch MD     • hydrOXYzine HCL  25 mg Oral Q6H PRN Max 4/day Stephanie Burch MD     • ibuprofen  200 mg Oral Q6H PRN Stephanie Burch MD     • ibuprofen  400 mg Oral Q6H PRN Stephanie uBrch MD     • ibuprofen 600 mg Oral Q8H PRN Antonio Berry MD     • melatonin  3 mg Oral HS Katharine Buchanan MD     • nicotine  14 mg Transdermal Daily Antonio Berry MD     • nicotine polacrilex  2 mg Oral Q2H PRN Vangie Isaac PA-C     • OLANZapine  10 mg Oral Q3H PRN Max 3/day Antonio Berry MD      Or   • OLANZapine  10 mg Intramuscular Q3H PRN Max 3/day Antonio Berry MD     • OLANZapine  5 mg Oral Q3H PRN Max 6/day Antonio Berry MD      Or   • OLANZapine  5 mg Intramuscular Q3H PRN Max 6/day Antonio Berry MD     • OLANZapine  2 5 mg Oral Q3H PRN Max 8/day Antonio Berry MD     • polyethylene glycol  17 g Oral Daily PRN Antonio Berry MD     • propranolol  10 mg Oral Q8H PRN Antonio Berry MD     • psyllium  1 packet Oral Daily Antonio Berry MD     • senna-docusate sodium  1 tablet Oral Daily PRN Antonio Berry MD     • ziprasidone  40 mg Oral BID With Meals Indu Villafana DO         Behavioral Health Medications:   all current active meds have been reviewed, continue current psychiatric medications and current meds:   Current Facility-Administered Medications   Medication Dose Route Frequency   • acetaminophen (TYLENOL) tablet 650 mg  650 mg Oral Q6H PRN   • aluminum-magnesium hydroxide-simethicone (MYLANTA) oral suspension 30 mL  30 mL Oral Q4H PRN   • artificial tear (LUBRIFRESH P M ) ophthalmic ointment 1 application  1 application Both Eyes K1J PRN   • haloperidol lactate (HALDOL) injection 2 5 mg  2 5 mg Intramuscular Q6H PRN Max 4/day    And   • LORazepam (ATIVAN) injection 1 mg  1 mg Intramuscular Q6H PRN Max 4/day    And   • benztropine (COGENTIN) injection 0 5 mg  0 5 mg Intramuscular Q6H PRN Max 4/day   • haloperidol lactate (HALDOL) injection 5 mg  5 mg Intramuscular Q4H PRN Max 4/day    And   • LORazepam (ATIVAN) injection 2 mg  2 mg Intramuscular Q4H PRN Max 4/day    And   • benztropine (COGENTIN) injection 1 mg  1 mg Intramuscular Q4H PRN Max 4/day   • benztropine (COGENTIN) injection 1 mg  1 mg Intramuscular Q4H PRN Max 6/day   • benztropine (COGENTIN) tablet 1 mg  1 mg Oral Q4H PRN Max 6/day   • desvenlafaxine succinate (PRISTIQ) 24 hr tablet 50 mg  50 mg Oral Daily   • hydrOXYzine HCL (ATARAX) tablet 50 mg  50 mg Oral Q6H PRN Max 4/day    Or   • diphenhydrAMINE (BENADRYL) injection 50 mg  50 mg Intramuscular Q6H PRN   • haloperidol (HALDOL) tablet 2 mg  2 mg Oral Q4H PRN Max 6/day   • haloperidol (HALDOL) tablet 5 mg  5 mg Oral Q6H PRN Max 4/day   • haloperidol (HALDOL) tablet 5 mg  5 mg Oral Q4H PRN Max 4/day   • hydrOXYzine HCL (ATARAX) tablet 100 mg  100 mg Oral Q6H PRN Max 4/day    Or   • LORazepam (ATIVAN) injection 2 mg  2 mg Intramuscular Q6H PRN   • hydrOXYzine HCL (ATARAX) tablet 25 mg  25 mg Oral Q6H PRN Max 4/day   • ibuprofen (MOTRIN) tablet 200 mg  200 mg Oral Q6H PRN   • ibuprofen (MOTRIN) tablet 400 mg  400 mg Oral Q6H PRN   • ibuprofen (MOTRIN) tablet 600 mg  600 mg Oral Q8H PRN   • melatonin tablet 3 mg  3 mg Oral HS   • nicotine (NICODERM CQ) 14 mg/24hr TD 24 hr patch 14 mg  14 mg Transdermal Daily   • nicotine polacrilex (NICORETTE) gum 2 mg  2 mg Oral Q2H PRN   • OLANZapine (ZyPREXA) tablet 10 mg  10 mg Oral Q3H PRN Max 3/day    Or   • OLANZapine (ZyPREXA) IM injection 10 mg  10 mg Intramuscular Q3H PRN Max 3/day   • OLANZapine (ZyPREXA) tablet 5 mg  5 mg Oral Q3H PRN Max 6/day    Or   • OLANZapine (ZyPREXA) IM injection 5 mg  5 mg Intramuscular Q3H PRN Max 6/day   • OLANZapine (ZyPREXA) tablet 2 5 mg  2 5 mg Oral Q3H PRN Max 8/day   • polyethylene glycol (MIRALAX) packet 17 g  17 g Oral Daily PRN   • propranolol (INDERAL) tablet 10 mg  10 mg Oral Q8H PRN   • psyllium (METAMUCIL) 1 packet  1 packet Oral Daily   • senna-docusate sodium (SENOKOT S) 8 6-50 mg per tablet 1 tablet  1 tablet Oral Daily PRN   • ziprasidone (GEODON) capsule 40 mg  40 mg Oral BID With Meals         Vital signs in last 24 hours:  Temp:  [98 3 °F (36 8 °C)-98 5 °F (36 9 °C)] 98 5 °F (36 9 °C)  HR:  [61-66] 61  Resp:  [16] 16  BP: (121-125)/(63-68) 121/63    Laboratory results:    I have personally reviewed all pertinent laboratory/tests results    Most Recent Labs:   Lab Results   Component Value Date    WBC 5 73 10/17/2022    RBC 4 04 10/17/2022    HGB 12 6 10/17/2022    HCT 38 4 10/17/2022     10/17/2022    RDW 12 2 10/17/2022    NEUTROABS 2 89 10/17/2022    SODIUM 137 10/18/2022    K 4 0 10/18/2022     10/18/2022    CO2 29 10/18/2022    BUN 7 10/18/2022    CREATININE 0 70 10/18/2022    GLUC 100 (H) 10/18/2022    CALCIUM 9 1 10/18/2022    AST 37 10/18/2022    ALT 44 10/18/2022    ALKPHOS 44 10/18/2022    TP 7 2 10/18/2022    ALB 4 4 10/18/2022    TBILI 0 77 10/18/2022    CHOLESTEROL 161 10/15/2022    HDL 34 (L) 10/15/2022    TRIG 104 10/15/2022    LDLCALC 106 10/15/2022    [place] 127 10/15/2022    IBA2MBIJEXGO 3 290 10/15/2022    RPR Non-Reactive 10/15/2022       Psychiatric Review of Systems:  Behavior over the last 24 hours: improved  Sleep: improved, reports 7 hours of restful sleep   Appetite: normal  Medication side effects: No   ROS: all other systems are negative    Mental Status Evaluation:    Appearance:  disheveled, looks older than stated age, bearded,  male   Behavior:  pleasant, cooperative, calm   Speech:  normal rate and volume, scant at times    Mood:  "Feeling better"   Affect:  Less constricted, brighter at times with appropriate laughter and smiles during the interview   Thought Process:  logical, coherent, goal directed   Associations: intact associations   Thought Content:  no overt delusions, does endorse some paranoia that has improved since admission   Perceptual Disturbances: no auditory hallucinations, no visual hallucinations, does not appear to be responding to internal stimuli and does not appear distracted    Risk Potential: Suicidal ideation - None at present  Homicidal ideation - None at present  Potential for aggression - No   Sensorium:  oriented to person, place and time/date   Memory:  recent and remote memory grossly intact   Consciousness:  alert and awake   Attention/Concentration: attention span and concentration are age appropriate   Insight:  limited, but some improvement    Judgment: limited, improving    Gait/Station: normal gait/station   Motor Activity: no abnormal movements         Progress Toward Goals: Progressing, patient is becoming more trustful of staff and peers  He is engaging in group therapies and reports benefits from them  He admits to socializing more and feeling happy about it  Recommended Treatment:   See above for assessment and plan  Risks, benefits and possible side effects of Medications:   Risks, benefits, and possible side effects of medications explained to patient and patient verbalizes understanding  This note has been constructed using a voice recognition system  There may be translation, syntax, or grammatical errors  If you have any questions, please contact the dictating provider      Roxie Rea MD   Psychiatry PGY-1 -Abdominal, unclear etiology  -c/w Dilaudid 0.2mg IV, once dose required in 24 hours

## 2022-11-08 NOTE — NURSING NOTE
Patient visible on the unit throughout the evening  Pleasant and cooperative with routine  Denied any unmet needs  HS medication compliant  Nicotine gum given  Patient retreated to his room for sleep without issue  Will monitor

## 2022-11-08 NOTE — NURSING NOTE
Gee Evans denies SI/HI/AH/VH  He is accepting of his medications and mouth checks completed  Gee Evans is brighter and more social with peers  He is visible on the unit  He reports he slept very well last night and he is eating well  No behavioral issues to be reported  Will monitor

## 2022-11-09 RX ORDER — LANOLIN ALCOHOL/MO/W.PET/CERES
3 CREAM (GRAM) TOPICAL
Status: DISCONTINUED | OUTPATIENT
Start: 2022-11-09 | End: 2022-11-11 | Stop reason: HOSPADM

## 2022-11-09 RX ADMIN — DESVENLAFAXINE 50 MG: 50 TABLET, FILM COATED, EXTENDED RELEASE ORAL at 08:21

## 2022-11-09 RX ADMIN — PSYLLIUM HUSK 1 PACKET: 3.4 POWDER ORAL at 08:21

## 2022-11-09 RX ADMIN — NICOTINE POLACRILEX 2 MG: 2 GUM, CHEWING ORAL at 18:39

## 2022-11-09 RX ADMIN — MELATONIN TAB 3 MG 3 MG: 3 TAB at 20:03

## 2022-11-09 RX ADMIN — ZIPRASIDONE HYDROCHLORIDE 40 MG: 40 CAPSULE ORAL at 08:21

## 2022-11-09 RX ADMIN — ZIPRASIDONE HYDROCHLORIDE 40 MG: 40 CAPSULE ORAL at 16:41

## 2022-11-09 RX ADMIN — NICOTINE 14 MG: 14 PATCH, EXTENDED RELEASE TRANSDERMAL at 08:21

## 2022-11-09 NOTE — NURSING NOTE
Patient is pleasant and appropriate on interaction with peers and staff  He is visible and social   He denies S I H I  A/H V/H

## 2022-11-09 NOTE — PROGRESS NOTES
11/09/22 0841   Team Meeting   Meeting Type Daily Rounds   Team Members Present   Team Members Present Physician;Nurse;   Physician Team Member Mark [id number]   Care Management Team Member Luis Alberto   Patient/Family Present   Patient Present No   Patient's Family Present No   Pt pleasant, cooperative, med/meal compliant  Requesting melatonin be scheduled for 8pm  Discharge pending for Friday

## 2022-11-09 NOTE — NURSING NOTE
The pt was awake and in the group rm this am  The pt denied pain,depression and anxiety  The pt denies A/V hallucinations at the time of assessment  The pt denied SI/HI  The pt participated in groups in the am then he took an afternoon nap for a couple of hours

## 2022-11-09 NOTE — PROGRESS NOTES
11/09/22 0965   Activity/Group Checklist   Group   (goals group)   Attendance Attended   Attendance Duration (min) 16-30   Interactions Interacted appropriately   Affect/Mood Appropriate   Goals Achieved Able to listen to others; Able to engage in interactions; Able to self-disclose; Able to recieve feedback

## 2022-11-09 NOTE — PROGRESS NOTES
11/09/22 1000   Activity/Group Checklist   Group Other (Comment)  ([facility])   Attendance Attended   Attendance Duration (min) 46-60   Interactions Interacted appropriately   Affect/Mood Appropriate   Goals Achieved Able to listen to others; Able to engage in interactions  (Patient was able to engage with materials   Patient gained insight on conflict resolution )

## 2022-11-09 NOTE — PROGRESS NOTES
[facility] Group Note     11/08/22 0930   Activity/Group Checklist   Group Community meeting  (Goals/Positive Affirmations)   Attendance Attended   Attendance Duration (min) 46-60   Interactions Interacted appropriately   Affect/Mood Appropriate;Bright  (Focused; Motivated)   Goals Achieved Identified feelings; Discussed coping strategies; Discussed self-esteem issues; Displayed empathy;Able to listen to others; Able to engage in interactions; Able to reflect/comment on own behavior;Able to self-disclose; Able to recieve feedback; Able to give feedback to another  (Demonstrated Appropriate [employer]ight)

## 2022-11-09 NOTE — PROGRESS NOTES
Progress Note - Guillermoclivegilmar Reid 33 y o  male MRN: [id number]  Unit/Bed#: [facility] [[email or web address] 343-01 Encounter: [id number]    Assessment/Plan   Principal Problem:    Major depression with psychotic features [facility])  Active Problems:    Wrist laceration    PTSD (post-traumatic stress disorder)    Medical clearance for psychiatric admission    Paranoia (psychosis) (Formerly [facility])      • Continue Pristiq 50 mg daily for depressed mood  • Continue Geodon 40 mg BID for psychotic features   • Continue Melatonin 3 mg QHS   • Continue to promote patient participation in therapeutic milieu  • Continue medical management by primary team   • Discharge disposition: TBD, tentative plan for discharge 11/11/22    Subjective: The patient was evaluated this morning for continuity of care and no acute distress noted throughout the evaluation  Over the past 24 hours staff noted the patient was pleasant and cooperative with staff  He was observed being brighter and more social with peers  Today on exam the patient was sitting comfortably in his chair and reports his mood as "feeling better"  He reports improved sleep stating he got around 7 hours of restful sleep  He describes his appetite and energy as good today, with some increase in his appetite  When asked about adverse effects he reports feeling less drowsy, and states "I feel glazed over the eyes"  He denies suicidal and homicidal ideation  He also denies visual and auditory hallucinations  At the beginning of the interview he was pulled from art therapy group and he had a piece of art that said "warzone" and he said he feels like he is in a warzone at times but not nearly as bad as it was before because he is used to "getting gaslit, and says it felt like dying by a thousand cuts"  He does not report feeling that way now  He was asked about his interactions with staff and he states it is better I talk with the "good staff" and the not good staff I do not really talk to   When pushed to expand on how he determines good from bad staff he said "it has to do with their political affiliation, they don't have to tell me I can just tell from experience"  He was asked about any recent conversations with family and he reports "I talked to my parents, but I did not tell them about the discharge day and left out details because the conversation was not in private"          Current Medications:  Current Facility-Administered Medications   Medication Dose Route Frequency Provider Last Rate   • acetaminophen  650 mg Oral Q6H PRN Judson More MD     • aluminum-magnesium hydroxide-simethicone  30 mL Oral Q4H PRN Judson More MD     • artificial tear  1 application Both Eyes U7M PRN Otlesvia More MD     • haloperidol lactate  2 5 mg Intramuscular Q6H PRN Max 4/day Judson More MD      And   • LORazepam  1 mg Intramuscular Q6H PRN Max 4/day Judson More MD      And   • benztropine  0 5 mg Intramuscular Q6H PRN Max 4/day Judson More MD     • haloperidol lactate  5 mg Intramuscular Q4H PRN Max 4/day Judson More MD      And   • LORazepam  2 mg Intramuscular Q4H PRN Max 4/day Otlesvia More MD      And   • benztropine  1 mg Intramuscular Q4H PRN Max 4/day Judson More MD     • benztropine  1 mg Intramuscular Q4H PRN Max 6/day Judson More MD     • benztropine  1 mg Oral Q4H PRN Max 6/day Otila MD Derik     • desvenlafaxine succinate  50 mg Oral Daily Maxx Slaughter MD     • hydrOXYzine HCL  50 mg Oral Q6H PRN Max 4/day Judson More MD      Or   • diphenhydrAMINE  50 mg Intramuscular Q6H PRN Judson More MD     • haloperidol  2 mg Oral Q4H PRN Max 6/day Otila MD Derik     • haloperidol  5 mg Oral Q6H PRN Max 4/day Otila MD Derik     • haloperidol  5 mg Oral Q4H PRN Max 4/day Otila MD Derik     • hydrOXYzine HCL  100 mg Oral Q6H PRN Max 4/day Dinoila MD Derik      Or   • LORazepam  2 mg Intramuscular Q6H PRN Judson More MD     • hydrOXYzine HCL  25 mg Oral Q6H PRN Max 4/day Otila MD Derik     • ibuprofen  200 mg Oral Q6H PRN Bety Saul MD     • ibuprofen  400 mg Oral Q6H PRN Bety Saul MD     • ibuprofen  600 mg Oral Q8H PRN Bety Saul MD     • melatonin  3 mg Oral HS Zack Burks MD     • nicotine  14 mg Transdermal Daily Bety Saul MD     • nicotine polacrilex  2 mg Oral Q2H PRN Kate Otoole PA-C     • OLANZapine  10 mg Oral Q3H PRN Max 3/day Bety Saul MD      Or   • OLANZapine  10 mg Intramuscular Q3H PRN Max 3/day Bety Saul MD     • OLANZapine  5 mg Oral Q3H PRN Max 6/day Bety Saul MD      Or   • OLANZapine  5 mg Intramuscular Q3H PRN Max 6/day Bety Saul MD     • OLANZapine  2 5 mg Oral Q3H PRN Max 8/day Bety Saul MD     • polyethylene glycol  17 g Oral Daily PRN Bety Saul MD     • propranolol  10 mg Oral Q8H PRN Bety Saul MD     • psyllium  1 packet Oral Daily Bety Saul MD     • senna-docusate sodium  1 tablet Oral Daily PRN Bety Saul MD     • ziprasidone  40 mg Oral BID With Meals Jessica Dalton,          Behavioral Health Medications:   all current active meds have been reviewed, continue current psychiatric medications and current meds:   Current Facility-Administered Medications   Medication Dose Route Frequency   • acetaminophen (TYLENOL) tablet 650 mg  650 mg Oral Q6H PRN   • aluminum-magnesium hydroxide-simethicone (MYLANTA) oral suspension 30 mL  30 mL Oral Q4H PRN   • artificial tear (LUBRIFRESH P M ) ophthalmic ointment 1 application  1 application Both Eyes P5E PRN   • haloperidol lactate (HALDOL) injection 2 5 mg  2 5 mg Intramuscular Q6H PRN Max 4/day    And   • LORazepam (ATIVAN) injection 1 mg  1 mg Intramuscular Q6H PRN Max 4/day    And   • benztropine (COGENTIN) injection 0 5 mg  0 5 mg Intramuscular Q6H PRN Max 4/day   • haloperidol lactate (HALDOL) injection 5 mg  5 mg Intramuscular Q4H PRN Max 4/day    And   • LORazepam (ATIVAN) injection 2 mg  2 mg Intramuscular Q4H PRN Max 4/day    And   • benztropine (COGENTIN) injection 1 mg  1 mg Intramuscular Q4H PRN Max 4/day   • benztropine (COGENTIN) injection 1 mg  1 mg Intramuscular Q4H PRN Max 6/day   • benztropine (COGENTIN) tablet 1 mg  1 mg Oral Q4H PRN Max 6/day   • desvenlafaxine succinate (PRISTIQ) 24 hr tablet 50 mg  50 mg Oral Daily   • hydrOXYzine HCL (ATARAX) tablet 50 mg  50 mg Oral Q6H PRN Max 4/day    Or   • diphenhydrAMINE (BENADRYL) injection 50 mg  50 mg Intramuscular Q6H PRN   • haloperidol (HALDOL) tablet 2 mg  2 mg Oral Q4H PRN Max 6/day   • haloperidol (HALDOL) tablet 5 mg  5 mg Oral Q6H PRN Max 4/day   • haloperidol (HALDOL) tablet 5 mg  5 mg Oral Q4H PRN Max 4/day   • hydrOXYzine HCL (ATARAX) tablet 100 mg  100 mg Oral Q6H PRN Max 4/day    Or   • LORazepam (ATIVAN) injection 2 mg  2 mg Intramuscular Q6H PRN   • hydrOXYzine HCL (ATARAX) tablet 25 mg  25 mg Oral Q6H PRN Max 4/day   • ibuprofen (MOTRIN) tablet 200 mg  200 mg Oral Q6H PRN   • ibuprofen (MOTRIN) tablet 400 mg  400 mg Oral Q6H PRN   • ibuprofen (MOTRIN) tablet 600 mg  600 mg Oral Q8H PRN   • melatonin tablet 3 mg  3 mg Oral HS   • nicotine (NICODERM CQ) 14 mg/24hr TD 24 hr patch 14 mg  14 mg Transdermal Daily   • nicotine polacrilex (NICORETTE) gum 2 mg  2 mg Oral Q2H PRN   • OLANZapine (ZyPREXA) tablet 10 mg  10 mg Oral Q3H PRN Max 3/day    Or   • OLANZapine (ZyPREXA) IM injection 10 mg  10 mg Intramuscular Q3H PRN Max 3/day   • OLANZapine (ZyPREXA) tablet 5 mg  5 mg Oral Q3H PRN Max 6/day    Or   • OLANZapine (ZyPREXA) IM injection 5 mg  5 mg Intramuscular Q3H PRN Max 6/day   • OLANZapine (ZyPREXA) tablet 2 5 mg  2 5 mg Oral Q3H PRN Max 8/day   • polyethylene glycol (MIRALAX) packet 17 g  17 g Oral Daily PRN   • propranolol (INDERAL) tablet 10 mg  10 mg Oral Q8H PRN   • psyllium (METAMUCIL) 1 packet  1 packet Oral Daily   • senna-docusate sodium (SENOKOT S) 8 6-50 mg per tablet 1 tablet  1 tablet Oral Daily PRN   • ziprasidone (GEODON) capsule 40 mg  40 mg Oral BID With Meals         Vital signs in last 24 hours:  Temp:  [97 7 °F (36 5 °C)] 97 7 °F (36 5 °C)  HR:  [85] 85  Resp:  [16] 16  BP: (141)/(61) 141/61    Laboratory results:    I have personally reviewed all pertinent laboratory/tests results    Most Recent Labs:   Lab Results   Component Value Date    WBC 5 73 10/17/2022    RBC 4 04 10/17/2022    HGB 12 6 10/17/2022    HCT 38 4 10/17/2022     10/17/2022    RDW 12 2 10/17/2022    NEUTROABS 2 89 10/17/2022    SODIUM 137 10/18/2022    K 4 0 10/18/2022     10/18/2022    CO2 29 10/18/2022    BUN 7 10/18/2022    CREATININE 0 70 10/18/2022    GLUC 100 (H) 10/18/2022    CALCIUM 9 1 10/18/2022    AST 37 10/18/2022    ALT 44 10/18/2022    ALKPHOS 44 10/18/2022    TP 7 2 10/18/2022    ALB 4 4 10/18/2022    TBILI 0 77 10/18/2022    CHOLESTEROL 161 10/15/2022    HDL 34 (L) 10/15/2022    TRIG 104 10/15/2022    LDLCALC 106 10/15/2022    [place] 127 10/15/2022    YYP1UXSJBCOJ 3 290 10/15/2022    RPR Non-Reactive 10/15/2022       Psychiatric Review of Systems:  Behavior over the last 24 hours: improving, become more trustful of staff and peers   Sleep: improved, reports 7 hours of restful sleep   Appetite: normal  Medication side effects: No   ROS: all other systems are negative    Mental Status Evaluation:    Appearance:  disheveled, looks older than stated age, bearded,  male   Behavior:  pleasant, cooperative, calm   Speech:  normal rate and volume, scant at times    Mood:  "Feeling better"   Affect:  Less constricted, brighter at times with appropriate laughter and smiles during the interview   Thought Process:  logical, coherent, goal directed   Associations: intact associations   Thought Content:  no overt delusions, does display some paranoia that has improved since admission   Perceptual Disturbances: no auditory hallucinations, no visual hallucinations, does not appear to be responding to internal stimuli and does not appear distracted    Risk Potential: Suicidal ideation - None at present  Homicidal ideation - None at present  Potential for aggression - No   Sensorium:  oriented to person, place and time/date   Memory:  recent and remote memory grossly intact   Consciousness:  alert and awake   Attention/Concentration: attention span and concentration are age appropriate   Insight:  limited    Judgment: limited, and improving    Gait/Station: normal gait/station   Motor Activity: no abnormal movements         Progress Toward Goals: Progressing, patient is becoming more trustful of staff and peers  He is engaging in group therapies and reports benefits from them  He admits to socializing more and feeling happy about it  Recommended Treatment:   See above for assessment and plan  Risks, benefits and possible side effects of Medications:   Risks, benefits, and possible side effects of medications explained to patient and patient verbalizes understanding  This note has been constructed using a voice recognition system  There may be translation, syntax, or grammatical errors  If you have any questions, please contact the dictating provider      Paula Ruiz MD   Psychiatry PGY-1

## 2022-11-10 RX ADMIN — PSYLLIUM HUSK 1 PACKET: 3.4 POWDER ORAL at 08:12

## 2022-11-10 RX ADMIN — NICOTINE 14 MG: 14 PATCH, EXTENDED RELEASE TRANSDERMAL at 08:12

## 2022-11-10 RX ADMIN — MELATONIN TAB 3 MG 3 MG: 3 TAB at 20:19

## 2022-11-10 RX ADMIN — DESVENLAFAXINE 50 MG: 50 TABLET, FILM COATED, EXTENDED RELEASE ORAL at 08:13

## 2022-11-10 RX ADMIN — ZIPRASIDONE HYDROCHLORIDE 40 MG: 40 CAPSULE ORAL at 17:07

## 2022-11-10 RX ADMIN — NICOTINE POLACRILEX 2 MG: 2 GUM, CHEWING ORAL at 05:53

## 2022-11-10 RX ADMIN — ZIPRASIDONE HYDROCHLORIDE 40 MG: 40 CAPSULE ORAL at 08:12

## 2022-11-10 NOTE — SOCIAL WORK
JOHAN menard for family guidance to schedule follow up appointment  JOHAN will continue to reach out to schedule follow up  SW left voicemail for Pt's mom to advise of discharge plan for tomorrow  JOHAN requested return phone call to further discuss  JOHAN again called family guidance to schedule intake appointment   JOHAN menard requesting return call

## 2022-11-10 NOTE — PROGRESS NOTES
11/10/22 1000   Activity/Group Checklist   Group Other (Comment)  (Group Art Therapy/Psychodynamic)   Attendance Attended   Attendance Duration (min) 46-60   Interactions Interacted appropriately   Affect/Mood Appropriate   Goals Achieved Able to listen to others; Able to engage in interactions  (Patient was able to engage with materials )

## 2022-11-10 NOTE — PROGRESS NOTES
11/10/22 0846   Team Meeting   Meeting Type Daily Rounds   Team Members Present   Team Members Present Physician;Nurse;   Physician Team Member [facility]   [facility] Member Luis Alberto   Patient/Family Present   Patient Present No   Patient's Family Present No   Pt pleasant, cooperative, visible on the unit  Med and meal compliant  Discharge pending for tomorrow

## 2022-11-10 NOTE — PROGRESS NOTES
[facility] Group Note     11/10/22 0930   Activity/Group Checklist   Group Community meeting  (Goals)   Attendance Attended   Attendance Duration (min) 16-30   Interactions Interacted appropriately   Affect/Mood Appropriate;Bright   Goals Achieved Identified feelings; Able to listen to others; Able to engage in interactions; Able to reflect/comment on own behavior;Able to self-disclose; Able to recieve feedback; Able to give feedback to another  (Developed appropriate goals)

## 2022-11-10 NOTE — PROGRESS NOTES
11/10/22 1300   Activity/Group Checklist   Group   (recovery group-laura)   Attendance Attended   Attendance Duration (min) 46-60   Interactions Interacted appropriately   Affect/Mood Appropriate   Goals Achieved Identified feelings; Discussed coping strategies; Discussed self-esteem issues; Able to listen to others; Able to engage in interactions; Able to reflect/comment on own behavior;Able to self-disclose; Able to recieve feedback

## 2022-11-10 NOTE — SOCIAL WORK
JOHAN spoke with Pt's mom who stated she is in agreement with Pt coming home  Mom asked about aftercare appointments and perseverative on appointments being scheduled prior to Pt's d/c  JOHAN advised this is standard procedure and will ensure Pt has follow up appointments scheduled  Mom reported understanding  JOHAN again called family guidance   JOHAN faxed clinical for intake to [phone]

## 2022-11-10 NOTE — NURSING NOTE
Pt calm and cooperative, visible on the unit and appropriate with staff and peers  Less suspicious of staff  Compliant with scheduled mediations and meals  Reports adequate sleep with time change of melatonin  No behavioral issues to be noted  Will monitor

## 2022-11-10 NOTE — PROGRESS NOTES
Met with Estephanie Thompson completed his relapse prevention  He was able to identify his symptoms, warning signs, support people and coping skills  He is future oriented and looking forward to discharge on Friday  He was given a copy of the plan and we discussed community ssupports

## 2022-11-10 NOTE — PROGRESS NOTES
Progress Note - Kaykay Rowandisha Reid 33 y o  male MRN: [id number]  Unit/Bed#: [facility] 343-01 Encounter: [id number]    Assessment/Plan   Principal Problem:    Major depression with psychotic features [facility])  Active Problems:    Wrist laceration    PTSD (post-traumatic stress disorder)    Medical clearance for psychiatric admission    Paranoia (psychosis) (formerly [facility])      • Continue Pristiq 50 mg daily for depressed mood  • Continue Geodon 40 mg BID for psychotic features   • Continue Melatonin 3 mg QHS   • Continue to promote patient participation in therapeutic milieu  • Continue medical management by primary team   • Discharge disposition: Tentative plan for discharge 11/11/22    Subjective: The patient was evaluated this morning for continuity of care and no acute distress noted throughout the evaluation  Over the past 24 hours staff noted the patient was pleasant and cooperative with staff  He was observed being brighter and more social with peers  He was observed participating in groups in the morning and took a afternoon nap for a couple of hours  Today on exam the patient was sitting comfortably in his chair and reports his mood as "pretty good"  He was smiling at the beginning of the encounter and came in the room with a rosary bead and an American bible  He was asked about the two and he said "I feel like there is a lot of ancient wisdom in there, and our society could use some of that"  He was asked about his art group he just left and he stated "I love art therapy, I am a creative  Outside of here I enjoy doing art  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  In today's group I felt it was a good way to express yourself, and I really like word isolation and we did that today"  He reports improved sleep stating he got around 7 hours of restful sleep  He describes his appetite and energy as good today, with some increase in his appetite   When asked about adverse effects he reports feeling "groggy, but it is tolerable "  He denies suicidal and homicidal ideation  He also denies visual and auditory hallucinations  He did admit to having some anxiety around going home  He says that he is "prioritizing big stuff in his life"  When asked further about this he states "I feel like I should be in a leadership role, and share my experiences to people, although it can be dangerous  I am looking into alternative dispute resolutions, its a thing where you can negotiate differences between parties"  He was excited to return back to art therapy at the conclusion of the interview       Current Medications:  Current Facility-Administered Medications   Medication Dose Route Frequency Provider Last Rate   • acetaminophen  650 mg Oral Q6H PRN Melva León MD     • aluminum-magnesium hydroxide-simethicone  30 mL Oral Q4H PRN Melva León MD     • artificial tear  1 application Both Eyes B8O PRN Melva León MD     • haloperidol lactate  2 5 mg Intramuscular Q6H PRN Max 4/day Melva León MD      And   • LORazepam  1 mg Intramuscular Q6H PRN Max 4/day Melva León MD      And   • benztropine  0 5 mg Intramuscular Q6H PRN Max 4/day Melva León MD     • haloperidol lactate  5 mg Intramuscular Q4H PRN Max 4/day Melva León MD      And   • LORazepam  2 mg Intramuscular Q4H PRN Max 4/day Melva León MD      And   • benztropine  1 mg Intramuscular Q4H PRN Max 4/day Melva León MD     • benztropine  1 mg Intramuscular Q4H PRN Max 6/day Melva León MD     • benztropine  1 mg Oral Q4H PRN Max 6/day Melva León MD     • desvenlafaxine succinate  50 mg Oral Daily Kristal East MD     • hydrOXYzine HCL  50 mg Oral Q6H PRN Max 4/day Melva León MD      Or   • diphenhydrAMINE  50 mg Intramuscular Q6H PRN Melva León MD     • haloperidol  2 mg Oral Q4H PRN Max 6/day Melva León MD     • haloperidol  5 mg Oral Q6H PRN Max 4/day Melva León MD     • haloperidol  5 mg Oral Q4H PRN Max 4/day Melva León MD     • hydrOXYzine HCL  100 mg Oral Q6H PRN Max 4/day Yakov Kennedy MD      Or   • LORazepam  2 mg Intramuscular Q6H PRN Yakov Kennedy MD     • hydrOXYzine HCL  25 mg Oral Q6H PRN Max 4/day Yakov Kennedy MD     • ibuprofen  200 mg Oral Q6H PRN Yakov Kennedy MD     • ibuprofen  400 mg Oral Q6H PRN Yakov Kennedy MD     • ibuprofen  600 mg Oral Q8H PRN Yakov Kennedy MD     • melatonin  3 mg Oral HS Rachell Alonso MD     • nicotine  14 mg Transdermal Daily Yakov Kennedy MD     • nicotine polacrilex  2 mg Oral Q2H PRN Kesha Mandel PA-C     • OLANZapine  10 mg Oral Q3H PRN Max 3/day Yakov Kennedy MD      Or   • OLANZapine  10 mg Intramuscular Q3H PRN Max 3/day Yakov Kennedy MD     • OLANZapine  5 mg Oral Q3H PRN Max 6/day Yakov Kennedy MD      Or   • OLANZapine  5 mg Intramuscular Q3H PRN Max 6/day Yakov Kennedy MD     • OLANZapine  2 5 mg Oral Q3H PRN Max 8/day Yakov Kennedy MD     • polyethylene glycol  17 g Oral Daily PRN Yakov Kennedy MD     • propranolol  10 mg Oral Q8H PRN Yakov Kennedy MD     • psyllium  1 packet Oral Daily Yakov Kennedy MD     • senna-docusate sodium  1 tablet Oral Daily PRN Yakov Kennedy MD     • ziprasidone  40 mg Oral BID With Meals Phoenix Mukherjee DO         Behavioral Health Medications:   all current active meds have been reviewed, continue current psychiatric medications and current meds:   Current Facility-Administered Medications   Medication Dose Route Frequency   • acetaminophen (TYLENOL) tablet 650 mg  650 mg Oral Q6H PRN   • aluminum-magnesium hydroxide-simethicone (MYLANTA) oral suspension 30 mL  30 mL Oral Q4H PRN   • artificial tear (LUBRIFRESH P M ) ophthalmic ointment 1 application  1 application Both Eyes X8U PRN   • haloperidol lactate (HALDOL) injection 2 5 mg  2 5 mg Intramuscular Q6H PRN Max 4/day    And   • LORazepam (ATIVAN) injection 1 mg  1 mg Intramuscular Q6H PRN Max 4/day    And   • benztropine (COGENTIN) injection 0 5 mg  0 5 mg Intramuscular Q6H PRN Max 4/day   • haloperidol lactate (HALDOL) injection 5 mg  5 mg Intramuscular Q4H PRN Max 4/day    And   • LORazepam (ATIVAN) injection 2 mg  2 mg Intramuscular Q4H PRN Max 4/day    And   • benztropine (COGENTIN) injection 1 mg  1 mg Intramuscular Q4H PRN Max 4/day   • benztropine (COGENTIN) injection 1 mg  1 mg Intramuscular Q4H PRN Max 6/day   • benztropine (COGENTIN) tablet 1 mg  1 mg Oral Q4H PRN Max 6/day   • desvenlafaxine succinate (PRISTIQ) 24 hr tablet 50 mg  50 mg Oral Daily   • hydrOXYzine HCL (ATARAX) tablet 50 mg  50 mg Oral Q6H PRN Max 4/day    Or   • diphenhydrAMINE (BENADRYL) injection 50 mg  50 mg Intramuscular Q6H PRN   • haloperidol (HALDOL) tablet 2 mg  2 mg Oral Q4H PRN Max 6/day   • haloperidol (HALDOL) tablet 5 mg  5 mg Oral Q6H PRN Max 4/day   • haloperidol (HALDOL) tablet 5 mg  5 mg Oral Q4H PRN Max 4/day   • hydrOXYzine HCL (ATARAX) tablet 100 mg  100 mg Oral Q6H PRN Max 4/day    Or   • LORazepam (ATIVAN) injection 2 mg  2 mg Intramuscular Q6H PRN   • hydrOXYzine HCL (ATARAX) tablet 25 mg  25 mg Oral Q6H PRN Max 4/day   • ibuprofen (MOTRIN) tablet 200 mg  200 mg Oral Q6H PRN   • ibuprofen (MOTRIN) tablet 400 mg  400 mg Oral Q6H PRN   • ibuprofen (MOTRIN) tablet 600 mg  600 mg Oral Q8H PRN   • melatonin tablet 3 mg  3 mg Oral HS   • nicotine (NICODERM CQ) 14 mg/24hr TD 24 hr patch 14 mg  14 mg Transdermal Daily   • nicotine polacrilex (NICORETTE) gum 2 mg  2 mg Oral Q2H PRN   • OLANZapine (ZyPREXA) tablet 10 mg  10 mg Oral Q3H PRN Max 3/day    Or   • OLANZapine (ZyPREXA) IM injection 10 mg  10 mg Intramuscular Q3H PRN Max 3/day   • OLANZapine (ZyPREXA) tablet 5 mg  5 mg Oral Q3H PRN Max 6/day    Or   • OLANZapine (ZyPREXA) IM injection 5 mg  5 mg Intramuscular Q3H PRN Max 6/day   • OLANZapine (ZyPREXA) tablet 2 5 mg  2 5 mg Oral Q3H PRN Max 8/day   • polyethylene glycol (MIRALAX) packet 17 g  17 g Oral Daily PRN   • propranolol (INDERAL) tablet 10 mg  10 mg Oral Q8H PRN   • psyllium (METAMUCIL) 1 packet  1 packet Oral Daily   • senna-docusate sodium (SENOKOT S) 8 6-50 mg per tablet 1 tablet  1 tablet Oral Daily PRN   • ziprasidone (GEODON) capsule 40 mg  40 mg Oral BID With Meals     Vital signs in last 24 hours:  Temp:  [98 1 °F (36 7 °C)-98 2 °F (36 8 °C)] 98 2 °F (36 8 °C)  HR:  [71-89] 71  Resp:  [16] 16  BP: (120-136)/(64-68) 136/68    Laboratory results:    I have personally reviewed all pertinent laboratory/tests results    Most Recent Labs:   Lab Results   Component Value Date    WBC 5 73 10/17/2022    RBC 4 04 10/17/2022    HGB 12 6 10/17/2022    HCT 38 4 10/17/2022     10/17/2022    RDW 12 2 10/17/2022    NEUTROABS 2 89 10/17/2022    SODIUM 137 10/18/2022    K 4 0 10/18/2022     10/18/2022    CO2 29 10/18/2022    BUN 7 10/18/2022    CREATININE 0 70 10/18/2022    GLUC 100 (H) 10/18/2022    CALCIUM 9 1 10/18/2022    AST 37 10/18/2022    ALT 44 10/18/2022    ALKPHOS 44 10/18/2022    TP 7 2 10/18/2022    ALB 4 4 10/18/2022    TBILI 0 77 10/18/2022    CHOLESTEROL 161 10/15/2022    HDL 34 (L) 10/15/2022    TRIG 104 10/15/2022    LDLCALC 106 10/15/2022    [place] 127 10/15/2022    TIM4XNPBPEHW 3 290 10/15/2022    RPR Non-Reactive 10/15/2022       Psychiatric Review of Systems:  Behavior over the last 24 hours: improving, become more trustful of staff and peers   Sleep: improved, reports 7 hours of restful sleep   Appetite: normal  Medication side effects: No   ROS: all other systems are negative    Mental Status Evaluation:    Appearance:  disheveled, looks older than stated age, bearded,  male   Behavior:  pleasant, cooperative, calm   Speech:  normal rate and volume, scant at times    Mood:  "Pretty good"   Affect:  Less constricted, more reactive, brighter at times with appropriate laughter and smiles during the interview   Thought Process:  logical, coherent, goal directed   Associations: intact associations   Thought Content:  no overt delusions   Perceptual Disturbances: no auditory hallucinations, no visual hallucinations, does not appear to be responding to internal stimuli and does not appear distracted    Risk Potential: Suicidal ideation - None at present  Homicidal ideation - None at present  Potential for aggression - No   Sensorium:  oriented to person, place and time/date   Memory:  recent and remote memory grossly intact   Consciousness:  alert and awake   Attention/Concentration: attention span and concentration are age appropriate   Insight:  limited    Judgment: limited, and improving    Gait/Station: normal gait/station   Motor Activity: no abnormal movements         Progress Toward Goals: Progressing, patient is becoming more trustful of staff and peers  He is engaging in group therapies and reports benefits from them  He admits to socializing more and feeling happy about it  Recommended Treatment:   See above for assessment and plan  Risks, benefits and possible side effects of Medications:   Risks, benefits, and possible side effects of medications explained to patient and patient verbalizes understanding  This note has been constructed using a voice recognition system  There may be translation, syntax, or grammatical errors  If you have any questions, please contact the dictating provider      Syeda Burton MD   Psychiatry PGY-1

## 2022-11-10 NOTE — PROGRESS NOTES
[facility] Group Note     11/10/22 1419   Activity/Group Checklist   Group Life Skills  (Responsibility and Perspective)   Attendance Attended   Attendance Duration (min) 46-60   Interactions Interacted appropriately   Affect/Mood Appropriate;Bright   Goals Achieved Identified feelings; Identified triggers; Discussed coping strategies; Discussed self-esteem issues; Able to listen to others; Able to engage in interactions; Able to reflect/comment on own behavior;Able to self-disclose; Able to recieve feedback; Able to give feedback to another  (Demonstrated Appropriate Insight)

## 2022-11-11 VITALS
OXYGEN SATURATION: 100 % | SYSTOLIC BLOOD PRESSURE: 120 MMHG | HEART RATE: 71 BPM | TEMPERATURE: 98.5 F | BODY MASS INDEX: 24.17 KG/M2 | WEIGHT: 194.4 LBS | HEIGHT: 75 IN | RESPIRATION RATE: 16 BRPM | DIASTOLIC BLOOD PRESSURE: 76 MMHG

## 2022-11-11 PROBLEM — G47.00 INSOMNIA: Status: ACTIVE | Noted: 2022-11-11

## 2022-11-11 PROBLEM — S61.519A WRIST LACERATION: Status: RESOLVED | Noted: 2022-10-12 | Resolved: 2022-11-11

## 2022-11-11 PROBLEM — F22 PARANOIA (PSYCHOSIS) (HCC): Status: RESOLVED | Noted: 2022-10-19 | Resolved: 2022-11-11

## 2022-11-11 PROBLEM — Z00.8 MEDICAL CLEARANCE FOR PSYCHIATRIC ADMISSION: Status: RESOLVED | Noted: 2022-10-16 | Resolved: 2022-11-11

## 2022-11-11 RX ORDER — ZIPRASIDONE HYDROCHLORIDE 40 MG/1
40 CAPSULE ORAL 2 TIMES DAILY WITH MEALS
Qty: 60 CAPSULE | Refills: 1 | Status: SHIPPED | OUTPATIENT
Start: 2022-11-11

## 2022-11-11 RX ORDER — LANOLIN ALCOHOL/MO/W.PET/CERES
3 CREAM (GRAM) TOPICAL
Qty: 30 TABLET | Refills: 1 | Status: SHIPPED | OUTPATIENT
Start: 2022-11-11

## 2022-11-11 RX ORDER — DESVENLAFAXINE 50 MG/1
50 TABLET, EXTENDED RELEASE ORAL DAILY
Qty: 30 TABLET | Refills: 1 | Status: SHIPPED | OUTPATIENT
Start: 2022-11-12

## 2022-11-11 RX ADMIN — DESVENLAFAXINE 50 MG: 50 TABLET, FILM COATED, EXTENDED RELEASE ORAL at 08:33

## 2022-11-11 RX ADMIN — NICOTINE 14 MG: 14 PATCH, EXTENDED RELEASE TRANSDERMAL at 08:32

## 2022-11-11 RX ADMIN — ZIPRASIDONE HYDROCHLORIDE 40 MG: 40 CAPSULE ORAL at 08:33

## 2022-11-11 RX ADMIN — PSYLLIUM HUSK 1 PACKET: 3.4 POWDER ORAL at 08:33

## 2022-11-11 NOTE — PROGRESS NOTES
11/11/22 0841   Team Meeting   Meeting Type Daily Rounds   Team Members Present   Team Members Present Physician;Nurse;   Physician Team Member Mark [id number] [facility] [place]   Patient/Family Present   Patient Present No   Patient's Family Present No     DC today

## 2022-11-11 NOTE — BH TRANSITION RECORD
Contact Information: If you have any questions, concerns, pended studies, tests and/or procedures, or emergencies regarding your inpatient behavioral health visit  Please contact [address] [place] [address] behavioral health unit 3B [phone]  and ask to speak to a , nurse or physician  A contact is available 24 hours/ 7 days a week at this number  Summary of Procedures Performed During your Stay:  Below is a list of major procedures performed during your hospital stay and a summary of results:  - Cardiac Procedures/Studies: EKG 10/28/22: Normal sinus rhythm, QTc 419  Pending Studies (From admission, onward)    None        Please follow up on the above pending studies with your PCP and/or referring provider      Rachell Alonso 11/11/22

## 2022-11-11 NOTE — DISCHARGE INSTR - APPOINTMENTS
Caroline Perez or Caren, our Efrem and Rogerio, will be calling you after your discharge, on the phone number that you provided  They will be available as an additional support, if needed  If you wish to speak with one of them, you may contact Natacha Felipe at [phone] or Meg Ortiz at [phone]

## 2022-11-11 NOTE — PROGRESS NOTES
Pt to D/C today  Pt denies SI/HI/AVH  Pt oriented x3  Pt to d/c to his parent's home and his mom will  upon discharge  Pt to follow up with [facility] on 11/14  Scripts sent to preferred pharmacy       Discharge Address: [address]  Phone: [phone]

## 2022-11-11 NOTE — NURSING NOTE
Pt visible on the unit, social with select peers, appropriate with staff  Pleasant and cooperative  Compliant with scheduled medications and meals, appetite good  Pt reports excitement and feeling anxious about discharge today and "going back to the real world"  Denies SI/HI/AVH  Discharge paperwork and medications reviewed with patient, patient verbalized understanding  All belongings received on admission were returned to patient at time of discharge  Pt voiced no questions or concerns  Pt escorted to lobby for discharge at [phone] by T

## 2022-11-11 NOTE — DISCHARGE SUMMARY
Discharge Summary - Kaykay Reid 33 y o  male MRN: [id number]  Unit/Bed#: [facility] 343-01 Encounter: [id number]     Admission Date: 10/26/2022         Discharge Date: 2022  2:45 PM    Attending Psychiatrist: Tabatha Chavez     Reason for Admission/HPI:   Please see initial H&P written by Dr Yessica Vargas on 10/27/22, reproduced in italics below:    Per ELIECER Nacsimento  on      "Peter Jarvis a 33 y  o  male with a history of depression and PTSD who was admitted to the inpatient older adult psychiatric unit on a voluntary 201 commitment basis due to depression and suicidal behavior  Per crisis: The patient is a 36 admission status 33years old transfer from [facility]  The patient has a Hx of PTSD and was recently admitted to [facility] after a suicidal attempt where the patient cut his wrists and overdosed on Benadryl pills  The patient eloped from the transport to the [facility] unit on [facility]  Later appears with his mother on [facility]  The patient was calm and cooperative on approach at the moment of the admission  Denies SI, AH, and VH  The patient presented a bilateral wrist wrap-up from the past suicidal attempt   The patient denies pain or discomfort    As patient had eloped and 302 had  in that amount of time since he was initially seen in the emergency department, I discussed with the patient and the treatment team possibility of changing his commitment to a voluntary admission  Keyonna Toussaint was in agreement that he needed help and voluntary commitment was signed      On evaluation in the inpatient psychiatric unit Lai states that he is remorseful that he acted on suicidal thoughts and cut his wrist   He states that he struggles with complex PTSD, which he describes as Guilherme Claw issue will never get Lige Failing states that he was feeling very upset and impulsively cut his wrists with a knife before immediately seeking assistance for his injury  Gaby Sterling states he also had taken half a bottle of Benadryl because “I wanted to numb myself”   He denies current suicidal or homicidal ideation, and reiterates that he is remorseful for his suicide attempt  Jerardo Mendoza states that he has a good relationship with his parents and siblings and does not want to leave them "   When I asked him why he attempted to elope from the emergency department, he admitted that in the past, he had a poor experience when he was involuntarily committed 4 years ago  Jerardo Mendoza states at that time, he was experiencing extreme stress related to his trauma and was having suicidal thoughts  Jerardo Mendoza was started on Zyprexa, which he states caused weight gain and made him feel “like a Ruy Appl states that he has been hesitant to seek psychiatric treatment since then  Jerardohawa Mendoza states that he had been doing well for the past several months, but things have gotten worse at work  Jerardohawa Mendoza states he has talked to HR about “harassment” and did not find a solution     He states he will not go into details about his trauma as “I just met you”  Jerardohawa Mendoza states that he has looked into treatment with EMDR and feels that it might be helpful for him  Jerardohawa Mendoza states that he had been feeling depressed, and experiencing anxiety that was causing him to have difficulty sleeping at night   He denies any symptoms of huong, stating that he does sleep well most nights except when he is experiencing anxiety   He states he has also been having nightmares almost every night and experiencing crying spells        Symptoms prior to admission included worsening depression, hopelessness, helplessness, paranoid ideation, delusional thoughts, anxiety symptoms, flashbacks and nightmares  Onset of symptoms was gradual starting several weeks ago with slowly worsening course since that time  Stressors preceding admission included financial problems, occupational problems, job loss, job stress, social difficulties and ongoing anxiety  Lai tested positive for COVID in the ED  He was therefore hospitalized on COVID unit on 6 T  I saw this patient while he was on the [facility] unit  Jarocho Chance was initially extremely reluctant to accept any kind of medication, citing a trial of Zyprexa in the past (and subsequent weight gain) as his reason  I let him know that although he was on 8 a 201 voluntary admission, this could quickly be converted to a 302 if he continued to resist treatment  We agreed on a low dose of Geodon, 20 mg p o  B i d         The morning after starting the Gianna Angelucmelissa admitted that his nightmares have gone away with 1 dose of Geodon 20 mg  He agreed to continue taking Geodon 20 mg p o  B i d   When it came time for Jarocho Chance to be transferred to the 3rd floor, he resisted, making it clear that he believed he was ready to go home  He became increasingly paranoid and argumentative with the attending physician on 3 T  He continued to insist on being discharged  Neither I nor Dr Josias Lu a thought that it was safe to discharge this patient, so we instituted instituted a to Dr Ramses Mack involuntary petition  Dr Josias Lu had increased his Geodon to 40 mg p o  B i d  Prior to his coming to the 3rd floor  Increase to 60 mg p o  B i d  upon arrival to the floor  Pt is now on meds over objection     On initial evaluation after admission to the inpatient psychiatric unit Jarocho Chance  was irritated   He decided to take a path of passive resistance, and is currently on a silent protest   In addition,  he has decided to fast, taking only tea as sustenance  In reviewing criteria for personality disorders, 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    Criteria for does:  A a pervasive distrust and suspiciousness of others such that there motives are interpreted as malevolent, beginning by early adulthood and present present in a wide variety of contexts, as indicated by 4 (or more of the followin   suspects, without sufficient basis, that others are exploiting, harming or deceiving him or her  2   is preoccupied with unjustified doubts about the loyalty or trustworthiness of friends or extend or associates  3  is reluctant to confide in others because of unwarranted fear that the information well be used maliciously against him or her  4  reads demeaning or threatening meanings into benign remarks or events  5   persistently bears grudges (IE is un- forgiving of insults, injuries or slights )  6  perceives attacks on his or her character or reputation that are not apparent to others and is quick to react angrily or to counter attack  7   has recurrent suspicions, without justification cup, regarding fidelity of spouse or sexual partner        Psychiatric Review Of Systems:     Sleep changes: no  Appetite changes: no  Weight changes: no  Energy/anergy: no  Interest/pleasure/anhedonia: unknown  Somatic symptoms: yes, he believes that bones might be broken in his dorsal forearms  Anxiety/panic: yes  Vannessa: no  Guilty/hopeless: no  Self injurious behavior/risky behavior: yes, he deeply cut both wrists  Suicidal ideation: no  Homicidal ideation: no  Auditory hallucinations: no  Visual hallucinations: no  Other hallucinations: no  Delusional thinking: yes, Believes that there is a probability but believes that there is a possibility that his the bones in his forearms might be broken  Eating disorder history: unknown  Obsessive/compulsive symptoms: obsessive thoughts        Past Medical History:   Diagnosis Date   • PTSD (post-traumatic stress disorder)    • Suicide attempt ([facility] [address] )      No past surgical history on file  Medications: All current active medications have been reviewed  Medications prior to admission:    Prior to Admission Medications   Prescriptions Last Dose Informant Patient Reported? Taking?   nicotine (NICODERM CQ) 14 mg/24hr TD 24 hr patch   No No   Sig: Place 1 patch on the skin daily Do not start before October 27, 2022     psyllium (METAMUCIL) packet No No   Sig: Take 1 packet by mouth daily Do not start before October 27, 2022  ziprasidone (GEODON) 40 mg capsule   No No   Sig: Take 1 capsule (40 mg total) by mouth 2 (two) times a day with meals      Facility-Administered Medications: None       Allergies: Allergies   Allergen Reactions   • Shellfish-Derived Products - Food Allergy Nausea Only     Nausea and itching of mouth       Objective     Vital signs in last 24 hours:    Temp:  [98 5 °F (36 9 °C)] 98 5 °F (36 9 °C)  HR:  [71] 71  Resp:  [16] 16  BP: (120)/(76) 120/76    No intake or output data in the 24 hours ending 11/11/22 [address] was admitted to the inpatient psychiatric unit and started on Behavioral Health checks every 7 minutes  During the hospitalization he was encouraged to attend individual therapy, group therapy, milieu therapy and occupational therapy  Psychiatric medications were adjusted over the hospital stay  To address depression, mood instability, irritability, agitation, psychotic symptoms, paranoid ideation, delusional thoughts, disorganized behavior and anxiety symptoms, Glo Long was treated with antidepressant Pristiq and antipsychotic medication Geodon  Medication doses were adjusted during the hospital course  Pristiq was added at the dose of 50 mg daily, had been discontinued while patient was more psychotic and irritable but safely restarted at 50 mg daily and continued through discharge  Geodon was Initially started on 6th floor prior to transfer to unit 3B and titrated to 60 mg b i d  On the unit  However, patient had been inconsistent with his p o  Intake  Patient had remained paranoid and at times agitated and refused Geodon  Patient was then given Haldol but had agreed to switch back to Geodon and remained compliant with taking this and compliant with his meals  Patient was then titrated to 40 mg b i d   And stabilized appropriately with notable improvement in mood, affect, and engagement in treatment  Prior to beginning of treatment medications risks and benefits and possible side effects including risk of parkinsonian symptoms, Tardive Dyskinesia and metabolic syndrome related to treatment with antipsychotic medications were reviewed with Aneesh Davila  He verbalized understanding and agreement for treatment  Upon admission Aneesh Davila was seen by medical service for medical clearance for inpatient treatment and medical follow up  Bill symptoms gradually improved over the hospital course  Initially after admission he was still feeling anxious, frustrated, labile, irritable, delusional, paranoid, psychotic and agitated at times  With adjustment of medications and therapeutic milieu his symptoms gradually resolved  At the end of treatment Aneesh Davila was significantly improved  His mood was doing well at the time of discharge  Aneesh Davila denied suicidal ideation, intent or plan at the time of discharge and denied homicidal ideation, intent or plan at the time of discharge  There was no overt psychosis at the time of discharge  Delusional thoughts were no longer present  Aneesh Davila was participating appropriately in milieu at the time of discharge  Behavior was appropriate on the unit at the time of discharge  Sleep and appetite were improved  Since Aneesh Davila was doing well at the end of the hospitalization, treatment team felt that he could be safely discharged to outpatient care  The outpatient follow up with [facility] for psychiatric intake was arranged by the unit  upon discharge      Mental Status at Time of Discharge:     Appearance:  casually dressed, dressed appropriately, adequate grooming, dressed in hospital attire   Behavior:  pleasant, cooperative, calm   Speech:  normal rate and volume   Mood:  euthymic   Affect:  normal range and intensity, appropriate   Thought Process:  logical, goal directed, linear   Associations: intact associations   Thought Content:  no overt delusions   Perceptual Disturbances: denies auditory or visual hallucinations when asked, does not appear responding to internal stimuli   Risk Potential: Suicidal ideation - None  Homicidal ideation - None  Potential for aggression - Not at present   Sensorium:  oriented to person, place and time/date   Memory:  recent and remote memory grossly intact   Consciousness:  alert and awake   Attention/Concentration: attention span and concentration are age appropriate   Insight:  fair   Judgment: fair   Gait/Station: normal gait/station   Motor Activity: no abnormal movements       Suicide/Homicide Risk Assessment:    Risk of Harm to Self:   The following ratings are based on assessment at the time of discharge, review of the hospital stay progress, assessment at the time of the interview and review of records  Demographic risk factors include: , never , male  Historical Risk Factors include: chronic psychiatric problems, history of mood disorder, history of psychosis, history of suicide attempt  Current Specific Risk Factors include: recent inpatient psychiatric admission - being discharged today, recent suicide attempt, diagnosis of mood disorder, chronic depressive symptoms, chronic anxiety symptoms, chronic paranoid ideation, poor impulse control  Protective Factors: no current suicidal ideation, no current depressive symptoms, stable mood, no current anxiety symptoms, no current psychotic symptoms, improved mood, ability to manage anger well, ability to make plans for the future, outpatient psychiatric follow up established, family support established, compliant with medications, effective coping skills, having a desire to be alive, personal beliefs, resiliency, restricted access to lethal means, safe and stable living environment, sense of determination, supportive family  Weapons/Firearms: none   The following steps have been taken to ensure weapons are properly secured: not applicable  Based on today's assessment, Manny Call presents the following risk of harm to self: low    Risk of Harm to Others: The following ratings are based on assessment at the time of discharge, review of the hospital stay progress, assessment at the time of the interview and review of records  Demographic Risk Factors include: male, unemployed, under age 37  Historical Risk Factors include: history of aggressive behavior, drug abuse  Current Specific Risk Factors include: recent difficulty with impulse control, recent episode of mood instability, recent episodes of agitation, chronic psychotic symptoms, multiple stressors, social difficulties, sees self as a victim   Protective Factors: no current homicidal ideation, improved impulse control, stable mood, improved mood, no current psychotic symptoms, compliant with medications, compliant with treatment, willing to continue psychiatric treatment, outpatient follow up established, ability to adapt to change, able to manage anger well, effective coping skills, good support system, supportive family, personal beliefs, resilience, restricted access to lethal means, sense of personal control, access to mental health treatment  Weapons/Firearms: none   The following steps have been taken to ensure weapons are properly secured: not applicable  Based on today's assessment, Manny Clal presents the following risk of harm to others: low    The following interventions are recommended: outpatient follow up with a psychiatrist, outpatient follow up with a therapist    Admission Diagnosis:    Principal Problem:    Major depression with psychotic features ([address] )  Active Problems:    PTSD (post-traumatic stress disorder)    Insomnia      Discharge Diagnosis:     Principal Problem:    Major depression with psychotic features ([address] )  Active Problems:    PTSD (post-traumatic stress disorder)    Insomnia  Resolved Problems:    Wrist laceration    Medical clearance for psychiatric admission    Paranoia (psychosis) [facility])      Laboratory Results: I have personally reviewed all pertinent laboratory/tests results    Most Recent Labs:   Lab Results   Component Value Date    WBC 5 73 10/17/2022    RBC 4 04 10/17/2022    HGB 12 6 10/17/2022    HCT 38 4 10/17/2022     10/17/2022    RDW 12 2 10/17/2022    NEUTROABS 2 89 10/17/2022    SODIUM 137 10/18/2022    K 4 0 10/18/2022     10/18/2022    CO2 29 10/18/2022    BUN 7 10/18/2022    CREATININE 0 70 10/18/2022    GLUC 100 (H) 10/18/2022    CALCIUM 9 1 10/18/2022    AST 37 10/18/2022    ALT 44 10/18/2022    ALKPHOS 44 10/18/2022    TP 7 2 10/18/2022    ALB 4 4 10/18/2022    TBILI 0 77 10/18/2022    CHOLESTEROL 161 10/15/2022    HDL 34 (L) 10/15/2022    TRIG 104 10/15/2022    LDLCALC 106 10/15/2022    [place] 127 10/15/2022    [id number] 3 290 10/15/2022    RPR Non-Reactive 10/15/2022       Discharge Medications:    See after visit summary for all reconciled discharge medications provided to patient and family  Discharge instructions/Information to patient and family:     See after visit summary for information provided to patient and family  Provisions for Follow-Up Care:    See after visit summary for information related to follow-up care and any pertinent home health orders  Discharge Statement:    I spent 25 minutes discharging the patient  This time was spent on the day of discharge  I had direct contact with the patient on the day of discharge  Additional documentation is required if more than 30 minutes were spent on discharge:    I reviewed with Thomas Dubon importance of compliance with medications and outpatient treatment after discharge  I discussed the medication regimen and possible side effects of the medications with Thomas Dubon prior to discharge  At the time of discharge he was tolerating psychiatric medications  I discussed outpatient follow up with Thomas Dubon    I reviewed with Thomas Dubon crisis plan and safety plan upon discharge      Discharge on Two Antipsychotic Medications: Claudia Evans MD 11/11/22

## 2022-11-11 NOTE — NURSING NOTE
Patient is a participant in the milieu and has been appropriately social with peers and staff - he shows tolerance and kindliness to the most disorganized of his peers  Pleasant and more relaxed with staff also  He was cooperative with HS medications and is anticipating discharge tomorrow  He denies S I H I  A/HV/H

## 2022-11-11 NOTE — DISCHARGE INSTR - OTHER ORDERS
CRISIS INFORMATION  24 [facility] EMERGENCY HOTLINE: [phone]    When you need someone to listen, the [facility] is available for 16 hours a day, 7 days a week, from the time of 7-10am and 2pm-2am   It is not available from the hours of 2am-6am and 10am-2pm  A representative can be reached at [phone]

## 2022-11-12 PROBLEM — F41.9 ANXIETY: Status: ACTIVE | Noted: 2022-11-12

## 2022-11-12 RX ORDER — NICOTINE 21 MG/24HR
1 PATCH, TRANSDERMAL 24 HOURS TRANSDERMAL DAILY
Qty: 28 PATCH | Refills: 1 | Status: SHIPPED | OUTPATIENT
Start: 2022-11-12

## 2022-11-12 RX ORDER — HYDROXYZINE 50 MG/1
50 TABLET, FILM COATED ORAL 3 TIMES DAILY PRN
Qty: 60 TABLET | Refills: 1 | Status: SHIPPED | OUTPATIENT
Start: 2022-11-12

## 2022-12-14 ENCOUNTER — HOSPITAL ENCOUNTER (EMERGENCY)
Facility: HOSPITAL | Age: 32
Discharge: HOME | End: 2022-12-14
Attending: EMERGENCY MEDICINE

## 2022-12-14 VITALS
OXYGEN SATURATION: 98 % | BODY MASS INDEX: 24.25 KG/M2 | TEMPERATURE: 99 F | DIASTOLIC BLOOD PRESSURE: 77 MMHG | SYSTOLIC BLOOD PRESSURE: 131 MMHG | HEART RATE: 84 BPM | WEIGHT: 194 LBS | RESPIRATION RATE: 18 BRPM

## 2022-12-14 DIAGNOSIS — R10.84 GENERALIZED ABDOMINAL PAIN: Primary | ICD-10-CM

## 2022-12-14 LAB
ALBUMIN SERPL BCP-MCNC: 4.1 G/DL (ref 3.5–5)
ALP SERPL-CCNC: 46 U/L (ref 46–116)
ALT SERPL W P-5'-P-CCNC: 33 U/L (ref 12–78)
ANION GAP SERPL CALCULATED.3IONS-SCNC: 10 MMOL/L (ref 4–13)
AST SERPL W P-5'-P-CCNC: 32 U/L (ref 5–45)
BASOPHILS # BLD AUTO: 0.06 THOUSANDS/ÂΜL (ref 0–0.1)
BASOPHILS NFR BLD AUTO: 1 % (ref 0–1)
BILIRUB SERPL-MCNC: 0.47 MG/DL (ref 0.2–1)
BUN SERPL-MCNC: 12 MG/DL (ref 5–25)
CALCIUM SERPL-MCNC: 8.8 MG/DL (ref 8.3–10.1)
CHLORIDE SERPL-SCNC: 102 MMOL/L (ref 96–108)
CO2 SERPL-SCNC: 28 MMOL/L (ref 21–32)
CREAT SERPL-MCNC: 1.04 MG/DL (ref 0.6–1.3)
EOSINOPHIL # BLD AUTO: 0.17 THOUSAND/ÂΜL (ref 0–0.61)
EOSINOPHIL NFR BLD AUTO: 2 % (ref 0–6)
ERYTHROCYTE [DISTWIDTH] IN BLOOD BY AUTOMATED COUNT: 12.1 % (ref 11.6–15.1)
GFR SERPL CREATININE-BSD FRML MDRD: 95 ML/MIN/1.73SQ M
GLUCOSE SERPL-MCNC: 129 MG/DL (ref 65–140)
HCT VFR BLD AUTO: 44.1 % (ref 36.5–49.3)
HGB BLD-MCNC: 14.9 G/DL (ref 12–17)
IMM GRANULOCYTES # BLD AUTO: 0.03 THOUSAND/UL (ref 0–0.2)
IMM GRANULOCYTES NFR BLD AUTO: 0 % (ref 0–2)
LIPASE SERPL-CCNC: 78 U/L (ref 73–393)
LYMPHOCYTES # BLD AUTO: 2.6 THOUSANDS/ÂΜL (ref 0.6–4.47)
LYMPHOCYTES NFR BLD AUTO: 27 % (ref 14–44)
MCH RBC QN AUTO: 31.7 PG (ref 26.8–34.3)
MCHC RBC AUTO-ENTMCNC: 33.8 G/DL (ref 31.4–37.4)
MCV RBC AUTO: 94 FL (ref 82–98)
MONOCYTES # BLD AUTO: 0.67 THOUSAND/ÂΜL (ref 0.17–1.22)
MONOCYTES NFR BLD AUTO: 7 % (ref 4–12)
NEUTROPHILS # BLD AUTO: 6.23 THOUSANDS/ÂΜL (ref 1.85–7.62)
NEUTS SEG NFR BLD AUTO: 63 % (ref 43–75)
NRBC BLD AUTO-RTO: 0 /100 WBCS
PLATELET # BLD AUTO: 241 THOUSANDS/UL (ref 149–390)
PMV BLD AUTO: 10.2 FL (ref 8.9–12.7)
POTASSIUM SERPL-SCNC: 3.9 MMOL/L (ref 3.5–5.3)
PROT SERPL-MCNC: 6.9 G/DL (ref 6.4–8.4)
RBC # BLD AUTO: 4.7 MILLION/UL (ref 3.88–5.62)
SODIUM SERPL-SCNC: 140 MMOL/L (ref 135–147)
WBC # BLD AUTO: 9.76 THOUSAND/UL (ref 4.31–10.16)

## 2022-12-14 RX ORDER — DICYCLOMINE HCL 20 MG
20 TABLET ORAL 2 TIMES DAILY
Qty: 20 TABLET | Refills: 0 | Status: SHIPPED | OUTPATIENT
Start: 2022-12-14

## 2022-12-14 RX ORDER — DICYCLOMINE HYDROCHLORIDE 10 MG/1
20 CAPSULE ORAL ONCE
Status: COMPLETED | OUTPATIENT
Start: 2022-12-14 | End: 2022-12-14

## 2022-12-14 RX ADMIN — DICYCLOMINE HYDROCHLORIDE 20 MG: 10 CAPSULE ORAL at 23:06

## 2022-12-15 NOTE — ED NOTES
32 yo SAYRA presents to ER via ambulance after the patient contacted the police earlier in the day and asked them to come to his residence @ 19:00 which they did do and called an ambulance for transport to the ER  The patient is not known to [facility] but is known to [facility] for behavioral health issues  Stressors: "the patient believes he and his dogs are being intentionally poisoned by domestic terrorism - ingesting research chemical which cause problems in the stomach and cause gurgling  There are also research chemicals in my food and my family's- everyone is ingesting it - I use a [place] filter to filter the water and try to eat more packaged foods - they are doing this to the patient because he use to work for [employer] - to find out who was doing this and how - I have been a victim"  Mood = "anxious" now; has been - "anxious but in a good mood considering what's going on" - recently - mood is reported as stable  Symptoms include: increased paranoia and belief he is being poisoned (as above); "stomach problems and gurgling; anxiety - "daily - decreased sleep, not eating due to not being hungry and increased pacing; etoh use from age 16 with last use being over the past summer; cannabis use which was "regular" but have not used in months; has used LSD and MDMA - years ago; also has tried cocaine and pills - "but that was minimal"  The patient denies: lethality; overt psychosis; any change with sleep/appetite/energy/concentration; current D/A use  The patient was not certain of what he wanted regarding this ER presentation - did mention he would consider another admission to [facility] (not in network with is insurance)  PES asked the patient's consent to speak with his mother which was denied

## 2022-12-15 NOTE — DISCHARGE INSTRUCTIONS
If pain becomes severe and doesn't go away, if pain moves to right lower abdomen, if you have persistent nausea and vomiting or bloody diarrhea, return to the ED

## 2022-12-15 NOTE — ED PROVIDER NOTES
History  Chief Complaint   Patient presents with   • Psychiatric Evaluation     Patient c/o that he is being targeted by a domestic terrorist group for 10 months, he thinks his well water is being poisoned with bio chemicals that feels like opiates and/or meth     HPI  Patient is a 33year old male history of PTSD presenting for evaluation of generalized abdominal pain, worse in the epigastrium, nausea, abdominal bloating for the past 2 weeks  Patient believes he is being poisoned by a domestic terrorist group  Patient denies diarrhea, states intermittent constipation, denies rectal bleeding or melena, fevers, chills, dysuria, hematuria  Patient denies any prior similar symptoms  Patient continues to eat and drink normally  Patient with stable paranoid delusions regarding being followed and poisoned by domestic terrorists  Patient denies SI/HI/AH/VH  Patient with outpatient care with psychiatrist, states he has adequate resources and is taking his medications, not desiring any additional psychiatric care at this time  Prior to Admission Medications   Prescriptions Last Dose Informant Patient Reported? Taking?   desvenlafaxine succinate (PRISTIQ) 50 mg 24 hr tablet 12/14/2022  No Yes   Sig: Take 1 tablet (50 mg total) by mouth daily Do not start before November 12, 2022    hydrOXYzine HCL (ATARAX) 50 mg tablet Unknown  No No   Sig: Take 1 tablet (50 mg total) by mouth 3 (three) times a day as needed for anxiety   melatonin 3 mg 12/13/2022  No Yes   Sig: Take 1 tablet (3 mg total) by mouth daily at bedtime   nicotine (NICODERM CQ) 14 mg/24hr TD 24 hr patch Unknown  No No   Sig: Place 1 patch on the skin daily   psyllium (METAMUCIL) packet Unknown  No No   Sig: Take 1 packet by mouth daily Do not start before October 27, 2022     ziprasidone (GEODON) 40 mg capsule 12/14/2022  No Yes   Sig: Take 1 capsule (40 mg total) by mouth 2 (two) times a day with meals      Facility-Administered Medications: None       Past Medical History:   Diagnosis Date   • PTSD (post-traumatic stress disorder)    • Suicide attempt [facility])        History reviewed  No pertinent surgical history  Family History   Problem Relation Age of Onset   • Alcohol abuse Father    • Depression Father      I have reviewed and agree with the history as documented  E-Cigarette/Vaping   • E-Cigarette Use Never User      E-Cigarette/Vaping Substances     Social History     Tobacco Use   • Smoking status: Every Day     Packs/day: 1 00     Years: 10 00     Pack years: 10 00     Types: Cigarettes   • Smokeless tobacco: Never   Vaping Use   • Vaping Use: Never used   Substance Use Topics   • Alcohol use: Not Currently   • Drug use: Not Currently     Types: Marijuana     Comment: occasional marijuana use       Review of Systems   Constitutional: Positive for appetite change  Negative for chills, fatigue and fever  HENT: Negative for congestion, rhinorrhea and sore throat  Eyes: Negative for photophobia and visual disturbance  Respiratory: Negative for chest tightness and shortness of breath  Cardiovascular: Negative for chest pain, palpitations and leg swelling  Gastrointestinal: Positive for abdominal pain and nausea  Negative for abdominal distention, diarrhea and vomiting  Endocrine: Negative for polydipsia and polyuria  Genitourinary: Negative for dysuria and hematuria  Musculoskeletal: Negative for arthralgias and myalgias  Skin: Negative for color change, pallor, rash and wound  Neurological: Negative for weakness, numbness and headaches  Psychiatric/Behavioral: Negative for confusion  Physical Exam  Physical Exam  Vitals and nursing note reviewed  Constitutional:       General: He is not in acute distress  Appearance: He is well-developed  He is not diaphoretic  Comments: Well-appearing, non-distressed  HENT:      Head: Normocephalic and atraumatic        Comments: Moist mucous membranes      Right Ear: External ear normal       Left Ear: External ear normal       Nose: Nose normal       Mouth/Throat:      Pharynx: No oropharyngeal exudate  Eyes:      Conjunctiva/sclera: Conjunctivae normal       Pupils: Pupils are equal, round, and reactive to light  Cardiovascular:      Rate and Rhythm: Normal rate and regular rhythm  Heart sounds: Normal heart sounds  No murmur heard  No friction rub  No gallop  Comments: RRR, no M/R/G  Extremities warm and well-perfused   Pulmonary:      Effort: Pulmonary effort is normal  No respiratory distress  Breath sounds: Normal breath sounds  No wheezing  Chest:      Chest wall: No tenderness  Abdominal:      General: Bowel sounds are normal  There is no distension  Palpations: Abdomen is soft  There is no mass  Tenderness: There is abdominal tenderness  There is no guarding or rebound  Comments: Minor generalized upper abdominal tenderness without rigidity, rebound, guarding  Musculoskeletal:         General: No deformity  Skin:     General: Skin is warm and dry  Capillary Refill: Capillary refill takes less than 2 seconds  Neurological:      Mental Status: He is alert and oriented to person, place, and time  Comments: AAOx3   Psychiatric:         Behavior: Behavior normal       Comments: Normal affect  Denies SI/HI/AH/VH            Vital Signs  ED Triage Vitals   Temperature Pulse Respirations Blood Pressure SpO2   12/14/22 2010 12/14/22 2010 12/14/22 2010 12/14/22 2010 12/14/22 2010   99 °F (37 2 °C) 103 16 166/77 97 %      Temp Source Heart Rate Source Patient Position - Orthostatic VS BP Location FiO2 (%)   12/14/22 2010 12/14/22 2010 12/14/22 2312 12/14/22 2010 --   Tympanic Monitor Sitting Right arm       Pain Score       --                  Vitals:    12/14/22 2010 12/14/22 2312   BP: 166/77 131/77   Pulse: 103 84   Patient Position - Orthostatic VS:  Sitting         Visual Acuity      ED Medications  Medications   dicyclomine (BENTYL) capsule 20 mg (20 mg Oral Given 12/14/22 2306)       Diagnostic Studies  Results Reviewed     Procedure Component Value Units Date/Time    Comprehensive metabolic panel [[id number]] Collected: 12/14/22 2204    Lab Status: Final result Specimen: Blood from Arm, Left Updated: 12/14/22 2225     Sodium 140 mmol/L      Potassium 3 9 mmol/L      Chloride 102 mmol/L      CO2 28 mmol/L      ANION GAP 10 mmol/L      BUN 12 mg/dL      Creatinine 1 04 mg/dL      Glucose 129 mg/dL      Calcium 8 8 mg/dL      AST 32 U/L      ALT 33 U/L      Alkaline Phosphatase 46 U/L      Total Protein 6 9 g/dL      Albumin 4 1 g/dL      Total Bilirubin 0 47 mg/dL      eGFR 95 ml/min/1 73sq m     Narrative:      National Kidney Disease Foundation guidelines for Chronic Kidney Disease (CKD):   •  Stage 1 with normal or high GFR (GFR > 90 mL/min/1 73 square meters)  •  Stage 2 Mild CKD (GFR = 60-89 mL/min/1 73 square meters)  •  Stage 3A Moderate CKD (GFR = 45-59 mL/min/1 73 square meters)  •  Stage 3B Moderate CKD (GFR = 30-44 mL/min/1 73 square meters)  •  Stage 4 Severe CKD (GFR = 15-29 mL/min/1 73 square meters)  •  Stage 5 End Stage CKD (GFR <15 mL/min/1 73 square meters)  Note: GFR calculation is accurate only with a steady state creatinine    Lipase [[id number]]  (Normal) Collected: 12/14/22 2204    Lab Status: Final result Specimen: Blood from Arm, Left Updated: 12/14/22 2225     Lipase 78 u/L     CBC and differential [[id number]] Collected: 12/14/22 2204    Lab Status: Final result Specimen: Blood from Arm, Left Updated: 12/14/22 2210     WBC 9 76 Thousand/uL      RBC 4 70 Million/uL      Hemoglobin 14 9 g/dL      Hematocrit 44 1 %      MCV 94 fL      MCH 31 7 pg      MCHC 33 8 g/dL      RDW 12 1 %      MPV 10 2 fL      Platelets 158 Thousands/uL      nRBC 0 /100 WBCs      Neutrophils Relative 63 %      Immat GRANS % 0 %      Lymphocytes Relative 27 %      Monocytes Relative 7 %      Eosinophils Relative 2 %      Basophils Relative 1 % Neutrophils Absolute 6 23 Thousands/µL      Immature Grans Absolute 0 03 Thousand/uL      Lymphocytes Absolute 2 60 Thousands/µL      Monocytes Absolute 0 67 Thousand/µL      Eosinophils Absolute 0 17 Thousand/µL      Basophils Absolute 0 06 Thousands/µL                  No orders to display              Procedures  Procedures         ED Course                                             MDM  Number of Diagnoses or Management Options  Generalized abdominal pain  Diagnosis management comments: Patient well appearing with normal vital signs and a grossly benign abdominal exam  Plan for belly labs, symptomatic treatment with bentyl  Patient not desiring psychiatric evaluation at this time for apparent paranoid delusions  Belly labs unremarkable  Treated with bentyl and provided with prescription for bentyl at home  Patient showing printed results of recent normal abdominal CT from outside hospital at time of reassessment  Provided with reassurance  Discharged with return precautions  Disposition  Final diagnoses:   Generalized abdominal pain     Time reflects when diagnosis was documented in both MDM as applicable and the Disposition within this note     Time User Action Codes Description Comment    12/14/2022 10:49 PM Kobe Sites Add [R10 39] Generalized abdominal pain       ED Disposition     ED Disposition   Discharge    Condition   Stable    Date/Time   Wed Dec 14, 2022 10:48 PM    Comment   Marco Antonio Elizondo discharge to home/self care                 Follow-up Information     Follow up With Specialties Details Why Contact Info Additional Information    [facility] Emergency Department Emergency Medicine  If symptoms worsen [address] [address] Emergency Department, [place], [place], [address]          Discharge Medication List as of 12/14/2022 10:51 PM      START taking these medications    Details dicyclomine (BENTYL) 20 mg tablet Take 1 tablet (20 mg total) by mouth 2 (two) times a day, Starting Wed 12/14/2022, Print         CONTINUE these medications which have NOT CHANGED    Details   desvenlafaxine succinate (PRISTIQ) 50 mg 24 hr tablet Take 1 tablet (50 mg total) by mouth daily Do not start before November 12, 2022 , Starting Sat 11/12/2022, Normal      melatonin 3 mg Take 1 tablet (3 mg total) by mouth daily at bedtime, Starting Fri 11/11/2022, Normal      ziprasidone (GEODON) 40 mg capsule Take 1 capsule (40 mg total) by mouth 2 (two) times a day with meals, Starting Fri 11/11/2022, Normal      hydrOXYzine HCL (ATARAX) 50 mg tablet Take 1 tablet (50 mg total) by mouth 3 (three) times a day as needed for anxiety, Starting Sat 11/12/2022, Normal      nicotine (NICODERM CQ) 14 mg/24hr TD 24 hr patch Place 1 patch on the skin daily, Starting Sat 11/12/2022, Normal      psyllium (METAMUCIL) packet Take 1 packet by mouth daily Do not start before October 27, 2022 , Starting Thu 10/27/2022, No Print             No discharge procedures on file      PDMP Review       Value Time User    PDMP Reviewed  Yes 10/26/2022 12:02 PM GEE Wade          ED Provider  Electronically Signed by           Danni Viera MD  12/15/22 [phone]

## 2023-02-16 ENCOUNTER — OFFICE VISIT (OUTPATIENT)
Dept: FAMILY MEDICINE CLINIC | Facility: CLINIC | Age: 33
End: 2023-02-16

## 2023-02-16 VITALS
RESPIRATION RATE: 16 BRPM | DIASTOLIC BLOOD PRESSURE: 76 MMHG | TEMPERATURE: 96.9 F | HEIGHT: 75 IN | WEIGHT: 233 LBS | BODY MASS INDEX: 28.97 KG/M2 | OXYGEN SATURATION: 97 % | HEART RATE: 81 BPM | SYSTOLIC BLOOD PRESSURE: 122 MMHG

## 2023-02-16 DIAGNOSIS — Z79.899 HIGH RISK MEDICATION USE: ICD-10-CM

## 2023-02-16 DIAGNOSIS — R63.5 WEIGHT GAIN: ICD-10-CM

## 2023-02-16 DIAGNOSIS — F32.3 MAJOR DEPRESSION WITH PSYCHOTIC FEATURES (HCC): Primary | ICD-10-CM

## 2023-02-16 DIAGNOSIS — Z13.220 SCREENING FOR LIPID DISORDERS: ICD-10-CM

## 2023-02-16 DIAGNOSIS — Z11.59 NEED FOR HEPATITIS C SCREENING TEST: ICD-10-CM

## 2023-02-16 DIAGNOSIS — Z13.1 SCREENING FOR DIABETES MELLITUS (DM): ICD-10-CM

## 2023-02-16 DIAGNOSIS — Z76.89 ENCOUNTER TO ESTABLISH CARE: ICD-10-CM

## 2023-02-16 DIAGNOSIS — F43.10 PTSD (POST-TRAUMATIC STRESS DISORDER): Chronic | ICD-10-CM

## 2023-02-16 DIAGNOSIS — F41.9 ANXIETY: ICD-10-CM

## 2023-02-16 DIAGNOSIS — G47.00 INSOMNIA, UNSPECIFIED TYPE: ICD-10-CM
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
## 2023-02-17 LAB
ALBUMIN SERPL-MCNC: 4.9 G/DL (ref 3.6–5.1)
ALBUMIN/GLOB SERPL: 2 (CALC) (ref 1–2.5)
ALP SERPL-CCNC: 39 U/L (ref 36–130)
ALT SERPL-CCNC: 24 U/L (ref 9–46)
AST SERPL-CCNC: 17 U/L (ref 10–40)
BASOPHILS # BLD AUTO: 40 CELLS/UL (ref 0–200)
BASOPHILS NFR BLD AUTO: 0.6 %
BILIRUB SERPL-MCNC: 0.7 MG/DL (ref 0.2–1.2)
BUN SERPL-MCNC: 18 MG/DL (ref 7–25)
BUN/CREAT SERPL: NORMAL (CALC) (ref 6–22)
CALCIUM SERPL-MCNC: 9.8 MG/DL (ref 8.6–10.3)
CHLORIDE SERPL-SCNC: 103 MMOL/L (ref 98–110)
CHOLEST SERPL-MCNC: 251 MG/DL
CHOLEST/HDLC SERPL: 5.1 (CALC)
CO2 SERPL-SCNC: 29 MMOL/L (ref 20–32)
CREAT SERPL-MCNC: 0.84 MG/DL (ref 0.6–1.26)
EOSINOPHIL # BLD AUTO: 121 CELLS/UL (ref 15–500)
EOSINOPHIL NFR BLD AUTO: 1.8 %
ERYTHROCYTE [DISTWIDTH] IN BLOOD BY AUTOMATED COUNT: 11.8 % (ref 11–15)
GFR/BSA.PRED SERPLBLD CYS-BASED-ARV: 120 ML/MIN/1.73M2
GLOBULIN SER CALC-MCNC: 2.5 G/DL (CALC) (ref 1.9–3.7)
GLUCOSE SERPL-MCNC: 75 MG/DL (ref 65–99)
HBA1C MFR BLD: 4.8 % OF TOTAL HGB
HCT VFR BLD AUTO: 45.8 % (ref 38.5–50)
HCV AB S/CO SERPL IA: 0.06
HCV AB SERPL QL IA: NORMAL
HDLC SERPL-MCNC: 49 MG/DL
HGB BLD-MCNC: 15.7 G/DL (ref 13.2–17.1)
LDLC SERPL CALC-MCNC: 166 MG/DL (CALC)
LYMPHOCYTES # BLD AUTO: 1615 CELLS/UL (ref 850–3900)
LYMPHOCYTES NFR BLD AUTO: 24.1 %
MCH RBC QN AUTO: 30.5 PG (ref 27–33)
MCHC RBC AUTO-ENTMCNC: 34.3 G/DL (ref 32–36)
MCV RBC AUTO: 88.9 FL (ref 80–100)
MONOCYTES # BLD AUTO: 630 CELLS/UL (ref 200–950)
MONOCYTES NFR BLD AUTO: 9.4 %
NEUTROPHILS # BLD AUTO: 4295 CELLS/UL (ref 1500–7800)
NEUTROPHILS NFR BLD AUTO: 64.1 %
NONHDLC SERPL-MCNC: 202 MG/DL (CALC)
PLATELET # BLD AUTO: 242 THOUSAND/UL (ref 140–400)
PMV BLD REES-ECKER: 11 FL (ref 7.5–12.5)
POTASSIUM SERPL-SCNC: 4.4 MMOL/L (ref 3.5–5.3)
PROT SERPL-MCNC: 7.4 G/DL (ref 6.1–8.1)
RBC # BLD AUTO: 5.15 MILLION/UL (ref 4.2–5.8)
SODIUM SERPL-SCNC: 141 MMOL/L (ref 135–146)
TRIGL SERPL-MCNC: 203 MG/DL
TSH SERPL-ACNC: 0.69 MIU/L (ref 0.4–4.5)
WBC # BLD AUTO: 6.7 THOUSAND/UL (ref 3.8–10.8)

## 2023-02-23 ENCOUNTER — OFFICE VISIT (OUTPATIENT)
Dept: FAMILY MEDICINE CLINIC | Facility: CLINIC | Age: 33
End: 2023-02-23

## 2023-02-23 VITALS
DIASTOLIC BLOOD PRESSURE: 80 MMHG | HEIGHT: 75 IN | BODY MASS INDEX: 29.59 KG/M2 | WEIGHT: 238 LBS | TEMPERATURE: 98 F | OXYGEN SATURATION: 99 % | HEART RATE: 82 BPM | SYSTOLIC BLOOD PRESSURE: 120 MMHG

## 2023-02-23 DIAGNOSIS — Z00.00 ANNUAL PHYSICAL EXAM: Primary | ICD-10-CM

## 2023-02-23 PROBLEM — E78.5 DYSLIPIDEMIA: Status: ACTIVE | Noted: 2023-02-23

## 2023-02-23 NOTE — PROGRESS NOTES
FAMILY PRACTICE HEALTH MAINTENANCE OFFICE VISIT  [facility] - [facility]    NAME: Chely Rosenthal  AGE: 33 y o  SEX: male  : 1990     DATE: 2023    Assessment and Plan       · Patient Counseling:   · Nutrition: Stressed importance of a well balanced diet, moderation of sodium/saturated fat, caloric balance and sufficient intake of fiber  · Exercise: Stressed the importance of regular exercise with a goal of 150 minutes per week  · Dental Health: Discussed daily flossing and brushing and regular dental visits   · Injury Prevention: Discussed Safety Belts, Safety Helmets, and Smoke Detectors    · Immunizations reviewed: Risks and Benefits discussed  · Discussed benefits of:  Screening labs  • BMI Counseling: Body mass index is 29 75 kg/m²  Discussed with patient's BMI with him  The BMI is above normal  Nutrition recommendations include reducing portion sizes, decreasing overall calorie intake, reducing intake of saturated fat and trans fat and reducing intake of cholesterol  Exercise recommendations include exercising 3-5 times per week  Chief Complaint     Chief Complaint   Patient presents with   • papers filled out     Had lab work done  History of Present Illness     Here for annual exam and lab review  Will be starting a part time job at [employer] serving  Currently trying to change mental health provider and has a counselor who is willing to work with him  Not happy with current psychiatrist  Has about 6 weeks of psych meds left and then has asked if can be managed here  Advised would do so if he continues to work regularly with counselor , with the agreement that if needed may refer back to psych for meds  Agreed  No suicidal ideation currently , reports mood has been okay on current regimen  No physical illness   No other issues or concerns         Well Adult Physical   Patient here for a comprehensive physical exam       Diet and Physical Activity  Diet: well balanced diet  Exercise: intermittently           General Health  Hearing: Normal:  bilateral  Vision: goes for regular eye exams and most recent eye exam <1 year  Dental: regular dental visits      The following portions of the patient's history were reviewed and updated as appropriate: allergies, current medications, past family history, past medical history, past social history, past surgical history and problem list     Review of Systems     Review of Systems   Constitutional: Negative for chills, diaphoresis, fatigue and fever  HENT: Negative for congestion, sinus pressure, sinus pain and sore throat  Eyes: Negative for visual disturbance  Respiratory: Negative for cough, chest tightness, shortness of breath and wheezing  Cardiovascular: Negative for chest pain, palpitations and leg swelling  Gastrointestinal: Negative for abdominal distention, abdominal pain, diarrhea and nausea  Endocrine: Negative for polydipsia and polyuria  Genitourinary: Negative for dysuria, frequency and urgency  Musculoskeletal: Negative for arthralgias, back pain and neck pain  Skin: Negative for rash  Allergic/Immunologic: Negative for immunocompromised state  Neurological: Negative for dizziness, weakness and headaches  Hematological: Negative for adenopathy  Psychiatric/Behavioral: Positive for dysphoric mood  Negative for self-injury and suicidal ideas  The patient is nervous/anxious  Past Medical History     Past Medical History:   Diagnosis Date   • PTSD (post-traumatic stress disorder)    • Suicide attempt [facility])        Past Surgical History     History reviewed  No pertinent surgical history      Social History     Social History     Socioeconomic History   • Marital status: Single     Spouse name: None   • Number of children: None   • Years of education: None   • Highest education level: None   Occupational History   • None   Tobacco Use   • Smoking status: Every Day Packs/day: 1 00     Years: 10 00     Pack years: 10 00     Types: Cigarettes   • Smokeless tobacco: Never   Vaping Use   • Vaping Use: Never used   Substance and Sexual Activity   • Alcohol use: Not Currently   • Drug use: Not Currently     Types: Marijuana     Comment: occasional marijuana use   • Sexual activity: None   Other Topics Concern   • None   Social History Narrative   • None     Social Determinants of Health     Financial Resource Strain: Not on file   Food Insecurity: Not on file   Transportation Needs: Not on file   Physical Activity: Not on file   Stress: Not on file   Social Connections: Not on file   Intimate Partner Violence: Not on file   Housing Stability: Not on file       Family History     Family History   Problem Relation Age of Onset   • No Known Problems Mother    • Alcohol abuse Father    • Depression Father    • Hypertension Father    • COPD Father    • Obesity Father    • Depression Brother    • Anxiety disorder Brother        Current Medications       Current Outpatient Medications:   •  desvenlafaxine succinate (PRISTIQ) 50 mg 24 hr tablet, Take 1 tablet (50 mg total) by mouth daily Do not start before November 12, 2022 , Disp: 30 tablet, Rfl: 1  •  hydrOXYzine HCL (ATARAX) 50 mg tablet, Take 1 tablet (50 mg total) by mouth 3 (three) times a day as needed for anxiety, Disp: 60 tablet, Rfl: 1  •  melatonin 3 mg, Take 1 tablet (3 mg total) by mouth daily at bedtime, Disp: 30 tablet, Rfl: 1  •  nicotine (NICODERM CQ) 14 mg/24hr TD 24 hr patch, Place 1 patch on the skin daily, Disp: 28 patch, Rfl: 1  •  ziprasidone (GEODON) 40 mg capsule, Take 1 capsule (40 mg total) by mouth 2 (two) times a day with meals, Disp: 60 capsule, Rfl: 1  •  dicyclomine (BENTYL) 20 mg tablet, Take 1 tablet (20 mg total) by mouth 2 (two) times a day (Patient not taking: Reported on 2/16/2023), Disp: 20 tablet, Rfl: 0     Allergies     Allergies   Allergen Reactions   • Shellfish-Derived Products - Food Allergy Nausea Only     Nausea and itching of mouth       Objective     Recent Results (from the past 672 hour(s))   CBC and differential    Collection Time: 02/16/23  1:39 PM   Result Value Ref Range    White Blood Cell Count 6 7 3 8 - 10 8 Thousand/uL    Red Blood Cell Count 5 15 4 20 - 5 80 Million/uL    Hemoglobin 15 7 13 2 - 17 1 g/dL    HCT 45 8 38 5 - 50 0 %    MCV 88 9 80 0 - 100 0 fL    MCH 30 5 27 0 - 33 0 pg    MCHC 34 3 32 0 - 36 0 g/dL    RDW 11 8 11 0 - 15 0 %    Platelet Count 408 848 - 400 Thousand/uL    SL AMB MPV 11 0 7 5 - 12 5 fL    Neutrophils (Absolute) 4,295 1,500 - 7,800 cells/uL    Lymphocytes (Absolute) 1,615 850 - 3,900 cells/uL    Monocytes (Absolute) 630 200 - 950 cells/uL    Eosinophils (Absolute) 121 15 - 500 cells/uL    Basophils ABS 40 0 - 200 cells/uL    Neutrophils 64 1 %    Lymphocytes 24 1 %    Monocytes 9 4 %    Eosinophils 1 8 %    Basophils PCT 0 6 %   Comprehensive metabolic panel    Collection Time: 02/16/23  1:39 PM   Result Value Ref Range    Glucose, Random 75 65 - 99 mg/dL    BUN 18 7 - 25 mg/dL    Creatinine 0 84 0 60 - 1 26 mg/dL    eGFR 120 > OR = 60 mL/min/1 73m2    SL AMB BUN/CREATININE RATIO NOT APPLICABLE 6 - 22 (calc)    Sodium 141 135 - 146 mmol/L    Potassium 4 4 3 5 - 5 3 mmol/L    Chloride 103 98 - 110 mmol/L    CO2 29 20 - 32 mmol/L    Calcium 9 8 8 6 - 10 3 mg/dL    Protein, Total 7 4 6 1 - 8 1 g/dL    Albumin 4 9 3 6 - 5 1 g/dL    Globulin 2 5 1 9 - 3 7 g/dL (calc)    Albumin/Globulin Ratio 2 0 1 0 - 2 5 (calc)    TOTAL BILIRUBIN 0 7 0 2 - 1 2 mg/dL    Alkaline Phosphatase 39 36 - 130 U/L    AST 17 10 - 40 U/L    ALT 24 9 - 46 U/L   TSH, 3rd generation    Collection Time: 02/16/23  1:39 PM   Result Value Ref Range    TSH 0 69 0 40 - 4 50 mIU/L   Hepatitis C Ab W/Refl To HCV RNA, Qn, PCR ([facility])    Collection Time: 02/16/23  1:39 PM   Result Value Ref Range    HEP C AB NON-REACTIVE NON-REACTIVE    Signal to Cut-Off 0 06 <1 00   Hemoglobin A1c (w/out EAG) Collection Time: 02/16/23  1:39 PM   Result Value Ref Range    Hemoglobin A1C 4 8 <5 7 % of total Hgb   Lipid panel    Collection Time: 02/16/23  1:39 PM   Result Value Ref Range    Total Cholesterol 251 (H) <200 mg/dL    HDL 49 > OR = 40 mg/dL    Triglycerides 203 (H) <150 mg/dL    LDL Calculated 166 (H) mg/dL (calc)    Chol HDLC Ratio 5 1 (H) <5 0 (calc)    Non-HDL Cholesterol 202 (H) <130 mg/dL (calc)   Reviewed lab/diagnostic results with pt including both normal and abnormal findings  In depth counseling and instructions given  All questions answered during visit  /80   Pulse 82   Temp 98 °F (36 7 °C)   Ht 6' 3" (1 905 m)   Wt 108 kg (238 lb)   SpO2 99%   BMI 29 75 kg/m²      Physical Exam  Constitutional:       General: He is not in acute distress  Appearance: Normal appearance  He is well-developed  HENT:      Head: Normocephalic and atraumatic  Right Ear: Tympanic membrane and ear canal normal       Left Ear: Tympanic membrane and ear canal normal       Nose: Nose normal       Mouth/Throat:      Mouth: Mucous membranes are moist       Pharynx: Oropharynx is clear  Eyes:      General: No scleral icterus  Extraocular Movements: Extraocular movements intact  Pupils: Pupils are equal, round, and reactive to light  Neck:      Thyroid: No thyromegaly  Cardiovascular:      Rate and Rhythm: Normal rate and regular rhythm  Heart sounds: No murmur heard  Pulmonary:      Effort: Pulmonary effort is normal  No respiratory distress  Breath sounds: Normal breath sounds  No wheezing or rales  Abdominal:      General: Bowel sounds are normal  There is no distension  Palpations: Abdomen is soft  Tenderness: There is no abdominal tenderness  Musculoskeletal:         General: Normal range of motion  Cervical back: Normal range of motion and neck supple  Right lower leg: No edema  Left lower leg: No edema     Lymphadenopathy:      Cervical: No cervical adenopathy  Skin:     General: Skin is warm and dry  Coloration: Skin is not jaundiced or pale  Neurological:      General: No focal deficit present  Mental Status: He is alert and oriented to person, place, and time  Cranial Nerves: No cranial nerve deficit  Sensory: No sensory deficit  Psychiatric:         Mood and Affect: Mood normal          Behavior: Behavior normal          Thought Content:  Thought content normal          Judgment: Judgment normal                  Sanjeev Santos

## 2023-03-16 ENCOUNTER — HOSPITAL ENCOUNTER (EMERGENCY)
Facility: HOSPITAL | Age: 33
End: 2023-03-18
Attending: EMERGENCY MEDICINE

## 2023-03-16 DIAGNOSIS — F20.9 SCHIZOPHRENIA (HCC): ICD-10-CM

## 2023-03-16 DIAGNOSIS — T50.902A OVERDOSE, INTENTIONAL SELF-HARM, INITIAL ENCOUNTER (HCC): Primary | ICD-10-CM

## 2023-03-16 LAB
2HR DELTA HS TROPONIN: 0 NG/L
ALBUMIN SERPL BCP-MCNC: 5.2 G/DL (ref 3.5–5)
ALP SERPL-CCNC: 44 U/L (ref 34–104)
ALT SERPL W P-5'-P-CCNC: 45 U/L (ref 7–52)
AMPHETAMINES SERPL QL SCN: NEGATIVE
ANION GAP SERPL CALCULATED.3IONS-SCNC: 10 MMOL/L (ref 4–13)
APAP SERPL-MCNC: <10 UG/ML (ref 10–20)
AST SERPL W P-5'-P-CCNC: 43 U/L (ref 13–39)
ATRIAL RATE: 74 BPM
BARBITURATES UR QL: NEGATIVE
BASOPHILS # BLD AUTO: 0.03 THOUSANDS/ÂΜL (ref 0–0.1)
BASOPHILS NFR BLD AUTO: 0 % (ref 0–1)
BENZODIAZ UR QL: NEGATIVE
BILIRUB SERPL-MCNC: 0.65 MG/DL (ref 0.2–1)
BILIRUB UR QL STRIP: NEGATIVE
BNP SERPL-MCNC: 5 PG/ML (ref 0–100)
BUN SERPL-MCNC: 14 MG/DL (ref 5–25)
CALCIUM SERPL-MCNC: 9.5 MG/DL (ref 8.4–10.2)
CARDIAC TROPONIN I PNL SERPL HS: 3 NG/L
CARDIAC TROPONIN I PNL SERPL HS: 3 NG/L
CHLORIDE SERPL-SCNC: 102 MMOL/L (ref 96–108)
CLARITY UR: CLEAR
CO2 SERPL-SCNC: 27 MMOL/L (ref 21–32)
COCAINE UR QL: NEGATIVE
COLOR UR: NORMAL
CREAT SERPL-MCNC: 0.93 MG/DL (ref 0.6–1.3)
EOSINOPHIL # BLD AUTO: 0.05 THOUSAND/ÂΜL (ref 0–0.61)
EOSINOPHIL NFR BLD AUTO: 1 % (ref 0–6)
ERYTHROCYTE [DISTWIDTH] IN BLOOD BY AUTOMATED COUNT: 12.2 % (ref 11.6–15.1)
ETHANOL SERPL-MCNC: <10 MG/DL
GFR SERPL CREATININE-BSD FRML MDRD: 109 ML/MIN/1.73SQ M
GLUCOSE SERPL-MCNC: 102 MG/DL (ref 65–140)
GLUCOSE UR STRIP-MCNC: NEGATIVE MG/DL
HCT VFR BLD AUTO: 47.8 % (ref 36.5–49.3)
HGB BLD-MCNC: 16.4 G/DL (ref 12–17)
HGB UR QL STRIP.AUTO: NEGATIVE
IMM GRANULOCYTES # BLD AUTO: 0.04 THOUSAND/UL (ref 0–0.2)
IMM GRANULOCYTES NFR BLD AUTO: 1 % (ref 0–2)
KETONES UR STRIP-MCNC: NEGATIVE MG/DL
LEUKOCYTE ESTERASE UR QL STRIP: NEGATIVE
LYMPHOCYTES # BLD AUTO: 1.43 THOUSANDS/ÂΜL (ref 0.6–4.47)
LYMPHOCYTES NFR BLD AUTO: 17 % (ref 14–44)
MCH RBC QN AUTO: 31.2 PG (ref 26.8–34.3)
MCHC RBC AUTO-ENTMCNC: 34.3 G/DL (ref 31.4–37.4)
MCV RBC AUTO: 91 FL (ref 82–98)
METHADONE UR QL: NEGATIVE
MONOCYTES # BLD AUTO: 0.56 THOUSAND/ÂΜL (ref 0.17–1.22)
MONOCYTES NFR BLD AUTO: 7 % (ref 4–12)
NEUTROPHILS # BLD AUTO: 6.5 THOUSANDS/ÂΜL (ref 1.85–7.62)
NEUTS SEG NFR BLD AUTO: 74 % (ref 43–75)
NITRITE UR QL STRIP: NEGATIVE
NRBC BLD AUTO-RTO: 0 /100 WBCS
OPIATES UR QL SCN: NEGATIVE
OXYCODONE+OXYMORPHONE UR QL SCN: NEGATIVE
P AXIS: -1 DEGREES
PCP UR QL: NEGATIVE
PH UR STRIP.AUTO: 6 [PH]
PLATELET # BLD AUTO: 231 THOUSANDS/UL (ref 149–390)
PMV BLD AUTO: 10.3 FL (ref 8.9–12.7)
POTASSIUM SERPL-SCNC: 4.2 MMOL/L (ref 3.5–5.3)
PR INTERVAL: 154 MS
PROT SERPL-MCNC: 7.4 G/DL (ref 6.4–8.4)
PROT UR STRIP-MCNC: NEGATIVE MG/DL
QRS AXIS: 66 DEGREES
QRSD INTERVAL: 82 MS
QT INTERVAL: 420 MS
QTC INTERVAL: 466 MS
RBC # BLD AUTO: 5.25 MILLION/UL (ref 3.88–5.62)
SALICYLATES SERPL-MCNC: <5 MG/DL (ref 3–20)
SODIUM SERPL-SCNC: 139 MMOL/L (ref 135–147)
SP GR UR STRIP.AUTO: <=1.005 (ref 1–1.03)
T WAVE AXIS: 51 DEGREES
THC UR QL: NEGATIVE
UROBILINOGEN UR QL STRIP.AUTO: 0.2 E.U./DL
VENTRICULAR RATE: 74 BPM
WBC # BLD AUTO: 8.61 THOUSAND/UL (ref 4.31–10.16)

## 2023-03-16 RX ORDER — ONDANSETRON 2 MG/ML
4 INJECTION INTRAMUSCULAR; INTRAVENOUS ONCE
Status: COMPLETED | OUTPATIENT
Start: 2023-03-16 | End: 2023-03-16

## 2023-03-16 RX ORDER — POISON ADSORBENT 50 G/240ML
50 SUSPENSION ORAL ONCE
Status: COMPLETED | OUTPATIENT
Start: 2023-03-16 | End: 2023-03-16

## 2023-03-16 RX ADMIN — ONDANSETRON 4 MG: 2 INJECTION INTRAMUSCULAR; INTRAVENOUS at 17:45

## 2023-03-16 RX ADMIN — ACTIVATED CHARCOAL 50 G: 208 SUSPENSION ORAL at 17:30

## 2023-03-16 RX ADMIN — SODIUM CHLORIDE 1000 ML: 0.9 INJECTION, SOLUTION INTRAVENOUS at 17:40

## 2023-03-16 NOTE — ED PROVIDER NOTES
History  Chief Complaint   Patient presents with   • Overdose - Intentional     Brought by mother, she brings empty pill bottles of Geodon , Atarax, and Pristiq  Patient states his mind is being controlled and he took the pills so no one around him will be harmed  Took pills 45 minutes ago      34 yo male ingested meds 45 minutes prior to arrival   He wanted to die, says he felt he may hurt others  The meds he took were his own and as follows:  geodon 40 mg about 30 pills, pristiq 50 mg about 2 pills and atarax 50 mg about 7 pills  He has not vomited  No recent illness or injury  +smoker  Denies alcohol or drug use  History provided by:  Patient   used: No    Overdose - Intentional  Associated symptoms: no abdominal pain, no chest pain, no cough, no diarrhea, no headaches, no nausea, no shortness of breath and no vomiting        Prior to Admission Medications   Prescriptions Last Dose Informant Patient Reported? Taking?   desvenlafaxine succinate (PRISTIQ) 50 mg 24 hr tablet Past Week  No Yes   Sig: Take 1 tablet (50 mg total) by mouth daily Do not start before November 12, 2022  dicyclomine (BENTYL) 20 mg tablet   No No   Sig: Take 1 tablet (20 mg total) by mouth 2 (two) times a day   Patient not taking: Reported on 2/16/2023   hydrOXYzine HCL (ATARAX) 50 mg tablet Past Week  No Yes   Sig: Take 1 tablet (50 mg total) by mouth 3 (three) times a day as needed for anxiety   melatonin 3 mg Past Week  No Yes   Sig: Take 1 tablet (3 mg total) by mouth daily at bedtime   nicotine (NICODERM CQ) 14 mg/24hr TD 24 hr patch   No No   Sig: Place 1 patch on the skin daily   ziprasidone (GEODON) 40 mg capsule Past Week  No Yes   Sig: Take 1 capsule (40 mg total) by mouth 2 (two) times a day with meals      Facility-Administered Medications: None       Past Medical History:   Diagnosis Date   • PTSD (post-traumatic stress disorder)    • Suicide attempt [facility])        History reviewed   No pertinent surgical history  Family History   Problem Relation Age of Onset   • No Known Problems Mother    • Alcohol abuse Father    • Depression Father    • Hypertension Father    • COPD Father    • Obesity Father    • Depression Brother    • Anxiety disorder Brother      I have reviewed and agree with the history as documented  E-Cigarette/Vaping   • E-Cigarette Use Never User      E-Cigarette/Vaping Substances   • Nicotine No    • THC No    • CBD No    • Flavoring No    • Other No    • Unknown No      Social History     Tobacco Use   • Smoking status: Every Day     Packs/day: 1 00     Years: 10 00     Pack years: 10 00     Types: Cigarettes   • Smokeless tobacco: Never   Vaping Use   • Vaping Use: Never used   Substance Use Topics   • Alcohol use: Not Currently   • Drug use: Not Currently     Types: Marijuana     Comment: occasional marijuana use       Review of Systems   Constitutional: Negative  Negative for chills and fever  HENT: Negative  Negative for congestion and sore throat  Eyes: Negative  Respiratory: Negative  Negative for cough and shortness of breath  Cardiovascular: Negative  Negative for chest pain and leg swelling  Gastrointestinal: Negative  Negative for abdominal pain, diarrhea, nausea and vomiting  Genitourinary: Negative  Negative for dysuria, flank pain and hematuria  Musculoskeletal: Negative  Negative for back pain and myalgias  Skin: Negative  Negative for rash and wound  Neurological: Negative  Negative for dizziness and headaches  Psychiatric/Behavioral: Negative  Negative for confusion and hallucinations  The patient is not nervous/anxious  All other systems reviewed and are negative  Physical Exam  Physical Exam  Vitals and nursing note reviewed  Constitutional:       General: He is not in acute distress  Appearance: He is well-developed  He is not ill-appearing or diaphoretic  HENT:      Head: Normocephalic and atraumatic     Eyes: General: No scleral icterus  Conjunctiva/sclera: Conjunctivae normal    Cardiovascular:      Rate and Rhythm: Normal rate and regular rhythm  Heart sounds: Normal heart sounds  No murmur heard  Pulmonary:      Effort: Pulmonary effort is normal  No respiratory distress  Breath sounds: Normal breath sounds  Chest:      Chest wall: No tenderness  Abdominal:      General: Bowel sounds are normal  There is no distension  Palpations: Abdomen is soft  Tenderness: There is no abdominal tenderness  Musculoskeletal:         General: No deformity  Normal range of motion  Cervical back: Normal range of motion and neck supple  Skin:     General: Skin is warm and dry  Coloration: Skin is not pale  Findings: No erythema or rash  Neurological:      General: No focal deficit present  Mental Status: He is alert and oriented to person, place, and time     Psychiatric:         Mood and Affect: Mood normal          Behavior: Behavior normal          Vital Signs  ED Triage Vitals   Temperature Pulse Respirations Blood Pressure SpO2   03/16/23 1654 03/16/23 1654 03/16/23 1654 03/16/23 1654 03/16/23 1654   98 4 °F (36 9 °C) (!) 109 20 142/70 96 %      Temp Source Heart Rate Source Patient Position - Orthostatic VS BP Location FiO2 (%)   03/16/23 1654 03/16/23 1654 03/16/23 1654 03/16/23 1654 --   Tympanic Monitor Sitting Left arm       Pain Score       03/16/23 2307       No Pain           Vitals:    03/16/23 2307 03/17/23 0921 03/17/23 2243 03/18/23 1552   BP: 138/63 131/68 133/77 132/66   Pulse: 86 86 68 86   Patient Position - Orthostatic VS: Lying  Sitting Sitting         Visual Acuity      ED Medications  Medications   sodium chloride 0 9 % bolus 1,000 mL (0 mL Intravenous Stopped 3/16/23 1840)   ondansetron (ZOFRAN) injection 4 mg (4 mg Intravenous Given 3/16/23 1745)   activated charcoal in sorbitol suspension 50 g (50 g Oral Given 3/16/23 1730)       Diagnostic Studies  Results Reviewed     Procedure Component Value Units Date/Time    FLU/RSV/COVID - if FLU/RSV clinically relevant [[id number]]  (Normal) Collected: 03/17/23 1635    Lab Status: Final result Specimen: Nares from Nose Updated: 03/17/23 2111     SARS-CoV-2 Negative     INFLUENZA A PCR Negative     INFLUENZA B PCR Negative     RSV PCR Negative    Narrative:      FOR PEDIATRIC PATIENTS - copy/paste COVID Guidelines URL to browser: [email or web address]    SARS-CoV-2 assay is a Nucleic Acid Amplification assay intended for the  qualitative detection of nucleic acid from SARS-CoV-2 in nasopharyngeal  swabs  Results are for the presumptive identification of SARS-CoV-2 RNA  Positive results are indicative of infection with SARS-CoV-2, the virus  causing COVID-19, but do not rule out bacterial infection or co-infection  with other viruses  Laboratories within the [place] and its  territories are required to report all positive results to the appropriate  public health authorities  Negative results do not preclude SARS-CoV-2  infection and should not be used as the sole basis for treatment or other  patient management decisions  Negative results must be combined with  clinical observations, patient history, and epidemiological information  This test has not been FDA cleared or approved  This test has been authorized by FDA under an Emergency Use Authorization  (EUA)  This test is only authorized for the duration of time the  declaration that circumstances exist justifying the authorization of the  emergency use of an in vitro diagnostic tests for detection of SARS-CoV-2  virus and/or diagnosis of COVID-19 infection under section 564(b)(1) of  the Act, 21 U  S C  245BEN-6(A)(1), unless the authorization is terminated  or revoked sooner  The test has been validated but independent review by FDA  and CLIA is pending  Test performed using [employer] GeneXpert:  This RT-PCR assay targets N2,  a region unique to SARS-CoV-2  A conserved region in the E-gene was chosen  for pan-Sarbecovirus detection which includes SARS-CoV-2  According to CMS-2020-01-R, this platform meets the definition of high-throughput technology  HS Troponin I 2hr [[id number]]  (Normal) Collected: 03/16/23 2039    Lab Status: Final result Specimen: Blood from Arm, Right Updated: 03/16/23 2108     hs TnI 2hr 3 ng/L      Delta 2hr hsTnI 0 ng/L     B-Type Natriuretic Peptide(BNP) [[id number]]  (Normal) Collected: 03/16/23 1739    Lab Status: Final result Specimen: Blood from Arm, Right Updated: 03/16/23 1943     BNP 5 pg/mL     HS Troponin 0hr (reflex protocol) [[id number]]  (Normal) Collected: 03/16/23 1829    Lab Status: Final result Specimen: Blood from Arm, Right Updated: 03/16/23 1902     hs TnI 0hr 3 ng/L     Ethanol [[id number]]  (Normal) Collected: 03/16/23 1739    Lab Status: Final result Specimen: Blood from Arm, Right Updated: 03/16/23 1847     Ethanol Lvl <10 mg/dL     Rapid drug screen, urine [[id number]]  (Normal) Collected: 03/16/23 1739    Lab Status: Final result Specimen: Urine, Clean Catch Updated: 03/16/23 1820     Amph/Meth UR Negative     Barbiturate Ur Negative     Benzodiazepine Urine Negative     Cocaine Urine Negative     Methadone Urine Negative     Opiate Urine Negative     PCP Ur Negative     THC Urine Negative     Oxycodone Urine Negative    Narrative:      FOR MEDICAL PURPOSES ONLY  IF CONFIRMATION NEEDED PLEASE CONTACT THE LAB WITHIN 5 DAYS      Drug Screen Cutoff Levels:  AMPHETAMINE/METHAMPHETAMINES  1000 ng/mL  BARBITURATES     200 ng/mL  BENZODIAZEPINES     200 ng/mL  COCAINE      300 ng/mL  METHADONE      300 ng/mL  OPIATES      300 ng/mL  PHENCYCLIDINE     25 ng/mL  THC       50 ng/mL  OXYCODONE      100 ng/mL    Comprehensive metabolic panel [[id number]]  (Abnormal) Collected: 03/16/23 1739    Lab Status: Final result Specimen: Blood from Arm, Right Updated: 03/16/23 1818 Sodium 139 mmol/L      Potassium 4 2 mmol/L      Chloride 102 mmol/L      CO2 27 mmol/L      ANION GAP 10 mmol/L      BUN 14 mg/dL      Creatinine 0 93 mg/dL      Glucose 102 mg/dL      Calcium 9 5 mg/dL      AST 43 U/L      ALT 45 U/L      Alkaline Phosphatase 44 U/L      Total Protein 7 4 g/dL      Albumin 5 2 g/dL      Total Bilirubin 0 65 mg/dL      eGFR 109 ml/min/1 73sq m     Narrative:      [place] guidelines for Chronic Kidney Disease (CKD):   •  Stage 1 with normal or high GFR (GFR > 90 mL/min/1 73 square meters)  •  Stage 2 Mild CKD (GFR = 60-89 mL/min/1 73 square meters)  •  Stage 3A Moderate CKD (GFR = 45-59 mL/min/1 73 square meters)  •  Stage 3B Moderate CKD (GFR = 30-44 mL/min/1 73 square meters)  •  Stage 4 Severe CKD (GFR = 15-29 mL/min/1 73 square meters)  •  Stage 5 End Stage CKD (GFR <15 mL/min/1 73 square meters)  Note: GFR calculation is accurate only with a steady state creatinine    Salicylate level [[id number]]  (Normal) Collected: 03/16/23 1739    Lab Status: Final result Specimen: Blood from Arm, Right Updated: 32/34/79 8978     Salicylate Lvl <5 mg/dL     Acetaminophen level-"If concentration is detectable, please discuss with medical  on call " [[id number]]  (Abnormal) Collected: 03/16/23 1739    Lab Status: Final result Specimen: Blood from Arm, Right Updated: 03/16/23 1818     Acetaminophen Level <10 ug/mL     UA (URINE) with reflex to Scope [[id number]] Collected: 03/16/23 1739    Lab Status: Final result Specimen: Urine, Clean Catch Updated: 03/16/23 1800     Color, UA Light Yellow     Clarity, UA Clear     Specific Gravity, UA <=1 005     pH, UA 6 0     Leukocytes, UA Negative     Nitrite, UA Negative     Protein, UA Negative mg/dl      Glucose, UA Negative mg/dl      Ketones, UA Negative mg/dl      Urobilinogen, UA 0 2 E U /dl      Bilirubin, UA Negative     Occult Blood, UA Negative    CBC and differential [[id number]] Collected: 03/16/23 1739 Lab Status: Final result Specimen: Blood from Arm, Right Updated: 03/16/23 1758     WBC 8 61 Thousand/uL      RBC 5 25 Million/uL      Hemoglobin 16 4 g/dL      Hematocrit 47 8 %      MCV 91 fL      MCH 31 2 pg      MCHC 34 3 g/dL      RDW 12 2 %      MPV 10 3 fL      Platelets 977 Thousands/uL      nRBC 0 /100 WBCs      Neutrophils Relative 74 %      Immat GRANS % 1 %      Lymphocytes Relative 17 %      Monocytes Relative 7 %      Eosinophils Relative 1 %      Basophils Relative 0 %      Neutrophils Absolute 6 50 Thousands/µL      Immature Grans Absolute 0 04 Thousand/uL      Lymphocytes Absolute 1 43 Thousands/µL      Monocytes Absolute 0 56 Thousand/µL      Eosinophils Absolute 0 05 Thousand/µL      Basophils Absolute 0 03 Thousands/µL                  No orders to display              Procedures  ECG 12 Lead Documentation Only    Date/Time: 3/16/2023 6:12 PM  Performed by: Mina Silverman MD  Authorized by: Mina Silverman MD     Indications / Diagnosis:  Overdose  ECG reviewed by me, the ED Provider: yes    Patient location:  ED  Previous ECG:     Previous ECG:  Unavailable  Interpretation:     Interpretation: abnormal    Rate:     ECG rate:  74    ECG rate assessment: normal    Rhythm:     Rhythm: sinus rhythm    Ectopy:     Ectopy: none    QRS:     QRS axis:  Normal  Conduction:     Conduction: normal    ST segments:     ST segments:  Non-specific    Elevation:  I, II, aVF, V2, V3, V4, V5 and V6  T waves:     T waves: normal    Comments:      QTc normal   Diffuse mild non-specific ST elevation, likely early repolarization pattern             ED Course                               SBIRT 22yo+    Flowsheet Row Most Recent Value   SBIRT (23 yo +)    In order to provide better care to our patients, we are screening all of our patients for alcohol and drug use  Would it be okay to ask you these screening questions? Yes Filed at: 03/17/2023 5400   Initial Alcohol Screen: US AUDIT-C     1   How often do you have a drink containing alcohol? 3 Filed at: 03/17/2023 0919   2  How many drinks containing alcohol do you have on a typical day you are drinking? 2 Filed at: 03/17/2023 0919   3a  Male UNDER 65: How often do you have five or more drinks on one occasion? 0 Filed at: 03/17/2023 0919   3b  FEMALE Any Age, or MALE 65+: How often do you have 4 or more drinks on one occassion? 0 Filed at: 03/17/2023 0919   Audit-C Score 5 Filed at: 03/17/2023 0208   DARIO: How many times in the past year have you    Used an illegal drug or used a prescription medication for non-medical reasons? Never Filed at: 03/17/2023 Enio  0674- discussed with toxicology on call who will do consult  Advised if pt  Remains stable and studies negative, can observe and monitor for 6 hours and then clear for psych  1800 - pt's pill bottles reviewed  Prescriptions were filled 2/8/2023 for 30 day supply so if he were taking them as prescribed he shouldn't have had any left  Pt  Admits he did stop taking them for a while  Mom says he restarted a few days ago  1900- reviewed blood test results with , they were in agreement with monitoring for total 6 hours and if remains ok, clear for BHU evaluation  Note:  Pt  Has been on 1:1 continual observation since arrival however I forgot to put the order into Epic until 2130 2230 - it has been over 6 hours from pt's ingestion, his mental status is normal, vitals are stable  Pt  Is medically cleared for BHU evaluation and disposition  46 -awaiting family guidance    Amount and/or Complexity of Data Reviewed  Labs: ordered  Risk  OTC drugs  Prescription drug management  Decision regarding hospitalization            Disposition  Final diagnoses:   Overdose, intentional self-harm, initial encounter ([facility] [address] )   Schizophrenia ([facility] [address] )     Time reflects when diagnosis was documented in both MDM as applicable and the Disposition within this note     Time User Action Codes Description Comment    0/90/9686  0:14 PM Chino HOLMAN Add [D23 239J] Overdose, intentional self-harm, initial encounter ([facility] [address] )     3/17/2023  3:48 PM Odilia Fraser Add [F20 9] Schizophrenia [facility])       ED Disposition     ED Disposition   Transfer to [address] [place] [address]   --    Date/Time   Fri Mar 17, 2023  3:48 PM    Comment   Osiris Hernandez should be transferred out to [facility] under Dr Ivanna Barakat and has been medically cleared             MD Documentation    Ranjit Baig Most Recent Value   Patient Condition The patient has been stabilized such that within reasonable medical probability, no material deterioration of the patient condition or the condition of the unborn child(brandon) is likely to result from the transfer   Reason for Transfer Level of Care needed not available at this facility   Benefits of Transfer Specialized equipment and/or services available at the receiving facility (Include comment)________________________   Risks of Transfer Potential for delay in receiving treatment, Potential deterioration of medical condition, Possible worsening of condition or death during transfer, Increased discomfort during transfer   Accepting Physician Dr Lloyd Person Name, Bladimir Bro   Sending MD Dr Caroline Gann   Provider Certification General risk, such as traffic hazards, adverse weather conditions, rough terrain or turbulence, possible failure of equipment (including vehicle or aircraft), or consequences of actions of persons outside the control of the transport personnel, Unanticipated needs of medical equipment and personnel during transport, Risk of worsening condition, The possibility of a transport vehicle being unavailable, The patient is stable for psychiatric transfer because they are medically stable, and is protected from harming him/herself or others during transport      RN Documentation    Flowsheet Row Most Recent Value   Accepting Facility Name, [facility]   Report Given to Omar Juan      Follow-up Information    None         Discharge Medication List as of 3/18/2023  5:13 PM      CONTINUE these medications which have NOT CHANGED    Details   desvenlafaxine succinate (PRISTIQ) 50 mg 24 hr tablet Take 1 tablet (50 mg total) by mouth daily Do not start before November 12, 2022 , Starting Sat 11/12/2022, Normal      hydrOXYzine HCL (ATARAX) 50 mg tablet Take 1 tablet (50 mg total) by mouth 3 (three) times a day as needed for anxiety, Starting Sat 11/12/2022, Normal      melatonin 3 mg Take 1 tablet (3 mg total) by mouth daily at bedtime, Starting Fri 11/11/2022, Normal      ziprasidone (GEODON) 40 mg capsule Take 1 capsule (40 mg total) by mouth 2 (two) times a day with meals, Starting Fri 11/11/2022, Normal      dicyclomine (BENTYL) 20 mg tablet Take 1 tablet (20 mg total) by mouth 2 (two) times a day, Starting Wed 12/14/2022, Print      nicotine (NICODERM CQ) 14 mg/24hr TD 24 hr patch Place 1 patch on the skin daily, Starting Sat 11/12/2022, Normal             No discharge procedures on file      PDMP Review       Value Time User    PDMP Reviewed  Yes 10/26/2022 12:02 PM GEE Vaughn          ED Provider  Electronically Signed by           Lei Gonzales MD  55/78/54 7549       Lei Gonzales MD  10/69/38 6007

## 2023-03-16 NOTE — CONSULTS
INTERPROFESSIONAL (PHONE) Elise 1979 Toxicology  Orvis Deeds 33 y o  male MRN: [id number]  Unit/Bed#: ED 03 Encounter: [id number]      Reason for Consult / Principal Problem: Intentional ingestion    Inpatient consult to Toxicology  Consult performed by: Radha Knowles MD  Consult ordered by: Roz Tristan MD        92/[birth year]/80    ASSESSMENT:  Intentional ziprasidone ingestion  Intentional hydroxyzine ingestion  Intentional desvenlafaxine ingestion    RECOMMENDATIONS:  Recommend EKG, CBC, CMP, and coma panel  Continue supportive care, including airway monitoring, head of bed elevation, aspiration precautions, cardiac telemetry, continuous pulse oximetry  Recommend activated charcoal if no contra-indications (decreased mental status, nausea/vomiting, aspiration risk)  Ziprasidone is an atypical antipsychotic that inhibits D2 dopamine receptors and serotonin 5HT2 receptors  Mild to moderate toxicity is characterized by nausea, vomiting, dizziness, agitation, slurred speech, and extrapyramidal symptoms  Severe toxicity can include hypotension, hypertension, tachycardia, QTc and QRS prolongation, and respiratory depression (in the context of CNS depression)  Treatment is supportive with airway and cardiac monitoring, treatment of cardiotoxicity if present, IVF hydration, and benzodiazepines as needed  Hydroxyzine can cause antimuscarinic toxicity in overdose (urinary retention, tachycardia, agitation/encephalopathy, flushed skin, dry mucus membranes)  Rarely, cardiac toxicity can be seen in severe ingestion  Treatment is supportive and includes IVF hydration, benzodiazepines as needed for agitation, and Martin catheter as needed for urinary retention  Desvenlafaxine is an SNRI and inhibits the reuptake of serotonin and norepinephrine which can lead to tachycardia, hypertension, agitation, seizures, and in severe cases, serotonin syndrome   Treatment is supportive and includes IVF hydration, avoiding additional serotonergic medications, and benzodiazepines as needed for agitation and muscular hyperactivity (goal to prevent hyperthermia which increases mortality)  If after 6 hours patient demonstrates normal vital signs, physical exam, and ambulation without signs or symptoms of drug toxicity, patient is appropriate for disposition from a toxicology perspective  For further questions, please contact the medical  on call via [place] Text or throughl the [employer]  Service or [email or web address]  Please see additional teaching note below:    Atypical Antipsychotics    Medical Toxicology   [address]   Last revised February 2011    Uses:  Atypical antipsychotics are used for a wide range of psychiatric disorders including psychosis, major depression, and bipolar disorders  The agents are occasionally used off label as sleep aids  Currently approved atypical antipsychotics include: clozapine, olanzapine, quetiepine, risperidone, ziprasidone, paliperidone, iloperidone, and asenapine  The primary pharmacologic target is to block D2 receptors in the CNS however, these agents are “dirty” affecting many other receptors including histamine, MAX, muscarine, norepinephrine, serotonin, and alpha  Metabolism: These agents are generally hepatically metabolized by various CYP systems  Genetic polymorphisms may contribute to the variable effects seen in overdose  Toxicity:  Toxicities are largely extensions of the therapeutic effects  CNS depression is the most common symptom  Tachycardia, anticholinergic effects, and mild hypotension are commonly observed signs  Serotonin toxicity is not expected with overdose of these agents since most have serotonin antagonistic properties  While extrapyramidal symptoms may rarely occur in therapeutic use, they are not a significant component of acute overdose toxicity    The neuroleptic malignant syndrome is not associated with acute overdose  See table below for the agent’s receptor effects and specific toxicities associated  Monitoring: All overdose patients should have acetaminophen and aspirin serum concentrations obtained to screen for occult ingestion  An electrocardiogram  should be obtained to ensure there is no significant ventricular dysrhthmia  QTc prolongation is common, is not associated with ventricular dysrhythmias, and resolves as toxicity resolves  A CBC may be recommended in clozapine toxicity due to its association with agranulocytosis though this adverse effect is seen in therapeutic use not in overdose  Treatment:  Treatment is largely supportive  Patients simply require time to metabolize the offending agent  Airway management may be indicated for airway protection though these agents do not cause central respiratory failure  IV hydration for patients with hypotension should be provided until the patient is felt to be euvolemic  If the patient remains hypotensive then a direct acting pressor, such as norepinephrine, should be added for blood pressure support  No active intervention is indicated for QTc prolongation though electrolytes should be repleted PRN to avoid increased risk of ventricular dysrhythmias  Agent D2 Antagonism H1/H2 Agonism Alpha-1 Antagonism Elimin T1/2 Comments   Clozapine + +++ - 6-7 hrs Agranulocytosis in therapeutic use   Risperidone ++ + +++ 3-20 hrs Has an active metabolite   Quetiepine + +++ ++ 5-8 hrs Anticholinergic effects last longer than CNS depression   Ziprasidone +++ + +++ 4-10 hrs    Olanzipine + +++ + 21-54 hrs Has muscarinic effects       Medical Toxicology  [facility]  Anticholinergic Syndrome  Updated 03/04/2008    a- Background: Anticholinergic intoxication can occur from exposure to a wide variety of prescription and over-the-counter medications and numerous plants and mushrooms   Common drugs that have anticholinergic activities include: antihistamines, antipsychotics, antispasmodics, skeletal muscle relaxants, and tricyclic antidepressants (TCAs)  Plants and mushrooms containing anticholinergic alkaloids include: jimsonweed (Datura stramonium), deadly nightshade (Atropa belladonna), and fly agaric (Damon Alvarez)  b- Mechanism of Toxicity:  • Competitively antagonize the effect of acetylcholine at peripheral muscarinic receptors  They mostly affect exocrine glands (such as sweating and salivation) and smooth muscle  Inhibition of muscarinic activities in the heart leads to a rapid heartbeat  • Pharmacokinetics: Absorption may be delayed due to its effect on GI motility  Duration of toxic effect can be quite prolonged (e g  benztropine  2-3 days)  c- Toxic dose: The range of toxicity is highly variable and unpredictable  • Examples of Fatal doses from case reports:  i  Young Child: 1-2 mg Atropine, instilled in the eye  ii  Adult: 32 mg Atropine, IM injection  • Minimal effect: increased heart rate and dry mouth after 360 mg of trospium chloride  d- Clinical presentation: Anticholinergic syndrome is characterized by warm, dry, flushed skin, dry mucous membranes, mydriasis, delirium, tachycardia, ileus, and urinary retention  Jerky myoclonic movements and choreoathetosis are common and can lead to rhabdomyolysis  Hyperthermia, coma, respiratory arrest and seizures may occur  e- Diagnosis: Based on history of exposure and typical features of anticholinergic syndrome  Trial dose of physostigmine can be used to confirm the diagnosis, especially with rapid reversal of the syndrome  f- Laboratory studies: Not generally available for the anticholinergics, but other labs can be useful such as electrolytes, glucose, CPK, and ECG  g- Treatment:   • ABC  • Seizure and muscular hyperactivity: Should be treated with benzodiazepines   Treat aggressively to prevent hyperthermia and rhabdomyolysis and their resultant complications  If unsuccessful use phenobarbital or propofol  • Delirium: Treat with benzodiazepines, if resistant to benzodiazepines consider treating with physostigmine  • GI decontamination maybe helpful in delayed presentation secondary to decreased GI motility provided that the patient is awake and alert  • Enhanced elimination: Procedures such as hemodialysis and repeated-dose activated charcoal are not effective in removing anticholinergic agents  • Physostigmine:   i  Pharmacology: Physostigmine is a carbamate and a reversible inhibitor of acetylcholinesterase  It increases acetylcholine concentration, causing stimulation of both muscarinic and nicotinic receptors  It also can penetrate the blood-brain barrier  It has nonspecific arousal effects  1  Onset of action: 3-8 minutes after parenteral administration  2  Duration of action: 30-90 minutes  3  Elimination half-life: 15-40 minutes  ii  Indications:  1  Severe anticholinergic syndrome from antimuscarinic agents  Generally used to reverse delirium resistant to benzodiazepines  2  Diagnostically: To differentiate functional psychosis from anticholinergic delirium   iii  Contraindications:  1  Should not be used as an antidote for cyclic antidepressant overdose because it may worsen cardiac conduction disturbance, cause bradyarrythmias or asystole, and aggravate or precipitate seizures  2  Do not use physostigmine with concurrent use of depolarizing neuromuscular blockers (e g  succinylcholine)  3  Known hypersensitivity to agent or preservative  4  Relative contraindication may include: Asthma, peripheral vascular disease, intestinal and bladder blockade, parkinsonian syndrome, and AV Block  iv  Adverse effects:  1  Bradycardia, heart block, and asystole  2  Seizure (particularly with rapid administration or excessive dose)  3  Nausea, vomiting, hypersalivation, and diarrhea  4  Bronchorrhea and bronchospasm    5  Fasciculation and muscle weakness  v  Dosage:  1  Adult: 0 5-2 mg slow IV push (£ 1 mg/min)  2  Children: 0 02 mg/kg slow IV push (£ 0 5 mg/min)  3  Repeat as needed every 10-30 minutes  4  Precautions: Patient should be on a cardiac monitor  Atropine should be kept at bedside to treat excessive muscarinic stimulation  5  Should not be administered IM or as a continuous infusion  6  It is better to undertreat than to overtreat  Physostigmine toxicity results when used in excess or in the absence of antimuscarinic toxicity  References:  Yumiko Jacoby  Poisoning & Drug Overdose  2007  Martina Mack’s Toxicologic Emergencies  2006  Discussion: Serotonin Syndrome  Medical Toxicology  [address]    Common agents: SSRIs (fluoxetine, sertraline, citalopram, escitalopram, paroxetine, and fluvoxamine), SNRIs (venlafaxine, desvenlafaxine, duloxetine), TCAs, MAOIs, lithium, cocaine, MDMA, linezolid, meperidine, tramadol, fentanyl, dextromethorphan, trazodone, methylene blue, St  Tushar's wort    Background and mechanism of toxicity: Serotonin syndrome involves excessive stimulation of the 5-HT2A receptors as a result of serotonin excess, typically through exposure to multiple serotonergic agents or massive ingestion of a single agent  Onset is rapid and typically occurs within 24 hours after an overdose or medication dose changes  Clinical presentation: Serotonin syndrome is classically characterized by altered mental status, autonomic instability, and neuromuscular hyperactivity  Symptoms often include confusion, agitation, tachycardia, diaphoresis, mydriasis, tremor, diarrhea, muscle rigidity, hyperthermia, hyperthermia, and hyperreflexia and clonus more pronounced in the lower extremities   Significant morbidity and mortality are associated with hyperthermia secondary to muscular hyperactivity; other complications that may occur from prolonged hyperthermia include rhabdomyolysis, hypotension, coagulopathy, metabolic acidosis, cardiac and renal failure, brain injury, and death  Minor manifestations of these symptoms may be seen with SSRI initiation  Seizures can also be seen with select agents  Diagnosis: Several diagnostic criteria exist for the diagnosis of serotonin syndrome, but it remains a clinical diagnosis especially in a patient presenting with the above symptoms in the context of exposure to an agent or agents capable of causing serotonin excess  EKG is recommended for all ingestions; citalopram and venlafaxine can exhibit cardiotoxicity through QRS and QTc prolongation  Evaluation of CBC and serum chemistries are helpful in addition to evaluation for co-ingestions and rhabdomyolysis  Treatment: Stop the offending agent and avoid other additional serotonergic agents  Airway stabilization and other supportive measures should be performed as needed  Activated charcoal can be given if presenting within one hour of ingestion, and no contraindications to charcoal exist  Treatment focuses on controlling muscular hyperactivity to prevent hyperthermia  Benzodiazepines are first-line for limiting this hyperactivity  If benzodiazepines are ineffective, the patient may require intubation and sedation with propofol or midazolam  If hyperthermic, aggressive cooling measures should be initiated with consideration of neuromuscular blockade (vecuronium) for refractory hyperthermia despite the aforementioned interventions  Cyproheptadine is not routinely recommended given limited benefit and restriction to PO administration  References:  BENJAMIN Engle  Antidepressants, General (noncyclic)  In: Alejandra Brian  Poisoning & Drug Overdose  7th ed  [facility]; 2018[de-identified] 104-107  Treatment of prolonged QT interval primarily includes optimization of electrolytes (magnesium, potassium, calcium)  A concerning corrected QT interval in the toxicological setting is > 500ms   This may increase risk of torsades de pointes  Bradycardia further increases the risk while tachycardia is generally protective  Should torsades de pointes occur, electrical cardioversion should occur if hemodynamically stable, whereas stable patients may be treated with magnesium, lidocaine and overdrive pacing if stable  Recommended magnesium administration is 2 grams IV over 5 minutes, may repeat if necessary, and followed by 0 5-1 gram/hour infusion  Overdrive pacing is started at 130-150 beats per minute and then decreased as tolerated toward 100-120 beats per minute  Pharmacological alternatives to electrical pacing may include epinephrine, dobutamine or isoproterenol  Avoid class 1a and 3 antidysrhythmics  Hx and PE limited by the dynamics of a phone consultation  I have not personally interviewed or evaluated the patient, but only advised based on the information provided to me  Primary provider is responsible for all clinical decisions  Pertinent history, physical exam and clinical findings and course discussed: Flaquita Valdes is a 33y o  year old male who presents with intentional ingestion of ziprasidone, hydroxyzine, and desvenlafaxine less than 1 hour prior to arrival  No reported toxidrome on exam  Mild tachycardia present  Review of systems and physical exam not performed by me  Historical Information   Past Medical History:   Diagnosis Date   • PTSD (post-traumatic stress disorder)    • Suicide attempt [facility])      History reviewed  No pertinent surgical history    Social History   Social History     Substance and Sexual Activity   Alcohol Use Not Currently     Social History     Substance and Sexual Activity   Drug Use Not Currently   • Types: Marijuana    Comment: occasional marijuana use     Social History     Tobacco Use   Smoking Status Every Day   • Packs/day: 1 00   • Years: 10 00   • Pack years: 10 00   • Types: Cigarettes   Smokeless Tobacco Never     Family History   Problem Relation Age of Onset   • No Known Problems Mother    • Alcohol abuse Father    • Depression Father    • Hypertension Father    • COPD Father    • Obesity Father    • Depression Brother    • Anxiety disorder Brother         Prior to Admission medications    Medication Sig Start Date End Date Taking?  Authorizing Provider   desvenlafaxine succinate (PRISTIQ) 50 mg 24 hr tablet Take 1 tablet (50 mg total) by mouth daily Do not start before November 12, 2022 11/12/22   Jacob Desai MD   dicyclomine (BENTYL) 20 mg tablet Take 1 tablet (20 mg total) by mouth 2 (two) times a day  Patient not taking: Reported on 2/16/2023 12/14/22   Celio Cordoba MD   hydrOXYzine HCL (ATARAX) 50 mg tablet Take 1 tablet (50 mg total) by mouth 3 (three) times a day as needed for anxiety 11/12/22   Jacob Desai MD   melatonin 3 mg Take 1 tablet (3 mg total) by mouth daily at bedtime 11/11/22   Jacob Desai MD   nicotine (NICODERM CQ) 14 mg/24hr TD 24 hr patch Place 1 patch on the skin daily 11/12/22   Jacob Desai MD   ziprasidone (GEODON) 40 mg capsule Take 1 capsule (40 mg total) by mouth 2 (two) times a day with meals 11/11/22   Jacob Desai MD       Current Facility-Administered Medications   Medication Dose Route Frequency   • ondansetron [facility] injection 4 mg  4 mg Intravenous Once   • sodium chloride 0 9 % bolus 1,000 mL  1,000 mL Intravenous Once       Allergies   Allergen Reactions   • Shellfish-Derived Products - Food Allergy Nausea Only     Nausea and itching of mouth       Objective     No intake or output data in the 24 hours ending 03/16/23 5193    Invasive Devices:   Peripheral IV 10/13/22 Left;Proximal;Ventral (anterior) Forearm (Active)       Peripheral IV 12/14/22 Left Antecubital (Active)       Vitals   Vitals:    03/16/23 1654   BP: 142/70   TempSrc: Tympanic   Pulse: (!) 109   Resp: 20   Patient Position - Orthostatic VS: Sitting   Temp: 98 4 °F (36 9 °C)         EKG, Pathology, and/or Other Studies: not yet available      Lab Results: not yet available    Labs: Invalid input(s):  EOSPCT       Invalid input(s): LABALBU           No results found for: TROPONINI          Invalid input(s): EXTPREGUR      Imaging Studies: n/a    Counseling / Coordination of Care  Total time spent today 18 minutes  This was a phone consultation

## 2023-03-17 LAB
FLUAV RNA RESP QL NAA+PROBE: NEGATIVE
FLUBV RNA RESP QL NAA+PROBE: NEGATIVE
RSV RNA RESP QL NAA+PROBE: NEGATIVE
SARS-COV-2 RNA RESP QL NAA+PROBE: NEGATIVE

## 2023-03-17 RX ORDER — HYDROXYZINE HYDROCHLORIDE 25 MG/1
25 TABLET, FILM COATED ORAL 3 TIMES DAILY PRN
Status: DISCONTINUED | OUTPATIENT
Start: 2023-03-17 | End: 2023-03-17

## 2023-03-17 RX ORDER — LANOLIN ALCOHOL/MO/W.PET/CERES
3 CREAM (GRAM) TOPICAL
Status: DISCONTINUED | OUTPATIENT
Start: 2023-03-17 | End: 2023-03-18 | Stop reason: HOSPADM

## 2023-03-17 RX ORDER — HYDROXYZINE HYDROCHLORIDE 25 MG/1
50 TABLET, FILM COATED ORAL 3 TIMES DAILY PRN
Status: DISCONTINUED | OUTPATIENT
Start: 2023-03-17 | End: 2023-03-18 | Stop reason: HOSPADM

## 2023-03-17 RX ORDER — DESVENLAFAXINE SUCCINATE 50 MG/1
50 TABLET, EXTENDED RELEASE ORAL DAILY
Status: DISCONTINUED | OUTPATIENT
Start: 2023-03-18 | End: 2023-03-18 | Stop reason: HOSPADM

## 2023-03-17 RX ORDER — DICYCLOMINE HYDROCHLORIDE 10 MG/1
20 CAPSULE ORAL 2 TIMES DAILY
Status: DISCONTINUED | OUTPATIENT
Start: 2023-03-17 | End: 2023-03-17

## 2023-03-17 RX ORDER — ZIPRASIDONE HYDROCHLORIDE 20 MG/1
40 CAPSULE ORAL 2 TIMES DAILY WITH MEALS
Status: DISCONTINUED | OUTPATIENT
Start: 2023-03-18 | End: 2023-03-18 | Stop reason: HOSPADM

## 2023-03-17 RX ORDER — NICOTINE 21 MG/24HR
14 PATCH, TRANSDERMAL 24 HOURS TRANSDERMAL ONCE
Status: DISCONTINUED | OUTPATIENT
Start: 2023-03-17 | End: 2023-03-18 | Stop reason: HOSPADM

## 2023-03-17 RX ADMIN — HYDROXYZINE HYDROCHLORIDE 50 MG: 25 TABLET ORAL at 22:36

## 2023-03-17 RX ADMIN — Medication 3 MG: at 22:36

## 2023-03-17 NOTE — ED CARE HANDOFF
Emergency Department Sign Out Note        Sign out and transfer of care from previous provider  See Separate Emergency Department note  The patient, Vickie Petit, was evaluated by the previous provider for psychiatric evaluation  Workup Completed:  Medical clearance work-up    ED Course / Workup Pending (followup): Inpatient psychiatric bed placement                                  ED Course as of 03/17/23 1556   Fri Mar 17, 2023   1547 Patient excepted to [facility] by Dr Maribell ANDREA completed  Patient is currently awaiting transport  Procedures  Medical Decision Making  Patient excepted to [facility] by Dr Maribell ANDREA completed  Patient is currently awaiting transport  Care patient signed out to the next attending who continue to monitor patient until patient is transferred  No acute issues noted during my shift  Amount and/or Complexity of Data Reviewed  Labs: ordered  Risk  OTC drugs  Prescription drug management  Decision regarding hospitalization  Disposition  Final diagnoses:   Overdose, intentional self-harm, initial encounter ([facility] )   Schizophrenia ([facility] )     Time reflects when diagnosis was documented in both MDM as applicable and the Disposition within this note     Time User Action Codes Description Comment    4/14/2699  4:58 PM Dallin HOLMAN Add [A98 956H] Overdose, intentional self-harm, initial encounter ([facility] )     3/17/2023  3:48 PM Gustavo White Add [F20 9] Schizophrenia [facility])       ED Disposition     ED Disposition   Transfer to [address] [place] [address]   --    Date/Time   Fri Mar 17, 2023  3:48 PM    Comment   Vickie Petit should be transferred out to [facility] under Dr Maribell Capps and has been medically cleared             MD Documentation    Flowsheet Row Most Recent Value   Patient Condition The patient has been stabilized such that within reasonable medical probability, no material deterioration of the patient condition or the condition of the unborn child(brandon) is likely to result from the transfer   Reason for Transfer Level of Care needed not available at this facility   Benefits of Transfer Specialized equipment and/or services available at the receiving facility (Include comment)________________________   Risks of Transfer Potential for delay in receiving treatment, Potential deterioration of medical condition, Possible worsening of condition or death during transfer, Increased discomfort during transfer   Accepting Physician Eder Raymond   Sending MD Dr Jordan Cockayne   Provider Certification General risk, such as traffic hazards, adverse weather conditions, rough terrain or turbulence, possible failure of equipment (including vehicle or aircraft), or consequences of actions of persons outside the control of the transport personnel, Unanticipated needs of medical equipment and personnel during transport, Risk of worsening condition, The possibility of a transport vehicle being unavailable, The patient is stable for psychiatric transfer because they are medically stable, and is protected from harming him/herself or others during transport      RN Documentation    [address]      Follow-up Information    None       Patient's Medications   Discharge Prescriptions    No medications on file     No discharge procedures on file         ED Provider  Electronically Signed by     Alpa Caputo DO  03/17/23 7451

## 2023-03-17 NOTE — EMTALA/ACUTE CARE TRANSFER
[address] [address]  Dept: [phone]      EMTALA TRANSFER CONSENT    NAME Jami Thomson                                         1990                              MRN [id number]    I have been informed of my rights regarding examination, treatment, and transfer   by Dr Anastacio Breaux DO    Benefits: Specialized equipment and/or services available at the receiving facility (Include comment)________________________    Risks: Potential for delay in receiving treatment, Potential deterioration of medical condition, Increased discomfort during transfer, Possible worsening of condition or death during transfer      Transfer Request   I acknowledge that my medical condition has been evaluated and explained to me by the emergency department physician or other qualified medical person and/or my attending physician who has recommended and offered to me further medical examination and treatment  I understand the Hospital's obligation with respect to the treatment and stabilization of my emergency medical condition  I nevertheless request to be transferred  I release the Hospital, the doctor, and any other persons caring for me from all responsibility or liability for any injury or ill effects that may result from my transfer and agree to accept all responsibility for the consequences of my choice to transfer, rather than receive stabilizing treatment at the Hospital  I understand that because the transfer is my request, my insurance may not provide reimbursement for the services  The Hospital will assist and direct me and my family in how to make arrangements for transfer, but the hospital is not liable for any fees charged by the transport service    In spite of this understanding, I refuse to consent to further medical examination and treatment which has been offered to me, and request transfer to  Loni [facility]ustos Name, [address] : [facility]  I authorize the performance of emergency medical procedures and treatments upon me in both transit and upon arrival at the receiving facility  Additionally, I authorize the release of any and all medical records to the receiving facility and request they be transported with me, if possible  I authorize the performance of emergency medical procedures and treatments upon me in both transit and upon arrival at the receiving facility  Additionally, I authorize the release of any and all medical records to the receiving facility and request they be transported with me, if possible  I understand that the safest mode of transportation during a medical emergency is an ambulance and that the Hospital advocates the use of this mode of transport  Risks of traveling to the receiving facility by car, including absence of medical control, life sustaining equipment, such as oxygen, and medical personnel has been explained to me and I fully understand them  (YEISON CORRECT BOX BELOW)  [  ]  I consent to the stated transfer and to be transported by ambulance/helicopter  [  ]  I consent to the stated transfer, but refuse transportation by ambulance and accept full responsibility for my transportation by car  I understand the risks of non-ambulance transfers and I exonerate the Hospital and its staff from any deterioration in my condition that results from this refusal     X___________________________________________    DATE  23  TIME________  Signature of patient or legally responsible individual signing on patient behalf           RELATIONSHIP TO PATIENT_________________________          Provider Certification    NAME Vickie Petit                                        SHANNAN 1990                              MRN [id number]    A medical screening exam was performed on the above named patient    Based on the examination:    Condition Necessitating Transfer The primary encounter diagnosis was Overdose, intentional self-harm, initial encounter ([facility] )  A diagnosis of Schizophrenia ([facility] ) was also pertinent to this visit  Patient Condition: The patient has been stabilized such that within reasonable medical probability, no material deterioration of the patient condition or the condition of the unborn child(brandon) is likely to result from the transfer    Reason for Transfer: Level of Care needed not available at this facility    Transfer Requirements: [facility]   · Space available and qualified personnel available for treatment as acknowledged by    · Agreed to accept transfer and to provide appropriate medical treatment as acknowledged by       Dr Laquita Brian  · Appropriate medical records of the examination and treatment of the patient are provided at the time of transfer   [address] _______  · Transfer will be performed by qualified personnel from    and appropriate transfer equipment as required, including the use of necessary and appropriate life support measures      Provider Certification: I have examined the patient and explained the following risks and benefits of being transferred/refusing transfer to the patient/family:  General risk, such as traffic hazards, adverse weather conditions, rough terrain or turbulence, possible failure of equipment (including vehicle or aircraft), or consequences of actions of persons outside the control of the transport personnel, Unanticipated needs of medical equipment and personnel during transport, Risk of worsening condition, The possibility of a transport vehicle being unavailable, The patient is stable for psychiatric transfer because they are medically stable, and is protected from harming him/herself or others during transport      Based on these reasonable risks and benefits to the patient and/or the unborn child(brandon), and based upon the information available at the time of the patient’s examination, I certify that the medical benefits reasonably to be expected from the provision of appropriate medical treatments at another medical facility outweigh the increasing risks, if any, to the individual’s medical condition, and in the case of labor to the unborn child, from effecting the transfer      X____________________________________________ DATE 03/17/23        TIME_______      ORIGINAL - SEND TO MEDICAL RECORDS   COPY - SEND WITH PATIENT DURING TRANSFER

## 2023-03-17 NOTE — EMTALA/ACUTE CARE TRANSFER
[address] [address]  Dept: [phone]      EMTALA TRANSFER CONSENT    NAME Sanford Allen                                         1990                              MRN [id number]    I have been informed of my rights regarding examination, treatment, and transfer   by Dr Zohreh Engel DO    Benefits: Specialized equipment and/or services available at the receiving facility (Include comment)________________________    Risks: Potential for delay in receiving treatment, Potential deterioration of medical condition, Possible worsening of condition or death during transfer, Increased discomfort during transfer      Transfer Request   I acknowledge that my medical condition has been evaluated and explained to me by the emergency department physician or other qualified medical person and/or my attending physician who has recommended and offered to me further medical examination and treatment  I understand the Hospital's obligation with respect to the treatment and stabilization of my emergency medical condition  I nevertheless request to be transferred  I release the Hospital, the doctor, and any other persons caring for me from all responsibility or liability for any injury or ill effects that may result from my transfer and agree to accept all responsibility for the consequences of my choice to transfer, rather than receive stabilizing treatment at the Hospital  I understand that because the transfer is my request, my insurance may not provide reimbursement for the services  The Hospital will assist and direct me and my family in how to make arrangements for transfer, but the hospital is not liable for any fees charged by the transport service    In spite of this understanding, I refuse to consent to further medical examination and treatment which has been offered to me, and request transfer to  Loni Bustos Name, [address] : [facility]  I authorize the performance of emergency medical procedures and treatments upon me in both transit and upon arrival at the receiving facility  Additionally, I authorize the release of any and all medical records to the receiving facility and request they be transported with me, if possible  I authorize the performance of emergency medical procedures and treatments upon me in both transit and upon arrival at the receiving facility  Additionally, I authorize the release of any and all medical records to the receiving facility and request they be transported with me, if possible  I understand that the safest mode of transportation during a medical emergency is an ambulance and that the Hospital advocates the use of this mode of transport  Risks of traveling to the receiving facility by car, including absence of medical control, life sustaining equipment, such as oxygen, and medical personnel has been explained to me and I fully understand them  (YEISON CORRECT BOX BELOW)  [  ]  I consent to the stated transfer and to be transported by ambulance/helicopter  [  ]  I consent to the stated transfer, but refuse transportation by ambulance and accept full responsibility for my transportation by car  I understand the risks of non-ambulance transfers and I exonerate the Hospital and its staff from any deterioration in my condition that results from this refusal     X___________________________________________    DATE  23  TIME________  Signature of patient or legally responsible individual signing on patient behalf           RELATIONSHIP TO PATIENT_________________________          Provider Certification    NAME Viviane Alec CAMPBELL 1990                              MRN [id number]    A medical screening exam was performed on the above named patient    Based on the examination:    Condition Necessitating Transfer The primary encounter diagnosis was Overdose, intentional self-harm, initial encounter ([facility] [address] )  A diagnosis of Schizophrenia ([facility] [facility] ) was also pertinent to this visit  Patient Condition: The patient has been stabilized such that within reasonable medical probability, no material deterioration of the patient condition or the condition of the unborn child(brandon) is likely to result from the transfer    Reason for Transfer: Level of Care needed not available at this facility    Transfer Requirements: [facility]   · Space available and qualified personnel available for treatment as acknowledged by    · Agreed to accept transfer and to provide appropriate medical treatment as acknowledged by       Dr Mead Heads  · Appropriate medical records of the examination and treatment of the patient are provided at the time of transfer   [address] _______  · Transfer will be performed by qualified personnel from    and appropriate transfer equipment as required, including the use of necessary and appropriate life support measures      Provider Certification: I have examined the patient and explained the following risks and benefits of being transferred/refusing transfer to the patient/family:  General risk, such as traffic hazards, adverse weather conditions, rough terrain or turbulence, possible failure of equipment (including vehicle or aircraft), or consequences of actions of persons outside the control of the transport personnel, Unanticipated needs of medical equipment and personnel during transport, Risk of worsening condition, The possibility of a transport vehicle being unavailable, The patient is stable for psychiatric transfer because they are medically stable, and is protected from harming him/herself or others during transport      Based on these reasonable risks and benefits to the patient and/or the unborn child(brandon), and based upon the information available at the time of the patient’s examination, I certify that the medical benefits reasonably to be expected from the provision of appropriate medical treatments at another medical facility outweigh the increasing risks, if any, to the individual’s medical condition, and in the case of labor to the unborn child, from effecting the transfer      X____________________________________________ DATE 03/17/23        TIME_______      ORIGINAL - SEND TO MEDICAL RECORDS   COPY - SEND WITH PATIENT DURING TRANSFER

## 2023-03-18 VITALS
WEIGHT: 229.72 LBS | TEMPERATURE: 98.7 F | RESPIRATION RATE: 18 BRPM | OXYGEN SATURATION: 95 % | SYSTOLIC BLOOD PRESSURE: 132 MMHG | DIASTOLIC BLOOD PRESSURE: 66 MMHG | HEART RATE: 86 BPM | BODY MASS INDEX: 28.71 KG/M2

## 2023-03-18 RX ADMIN — HYDROXYZINE HYDROCHLORIDE 50 MG: 25 TABLET ORAL at 17:10

## 2023-03-18 RX ADMIN — ZIPRASIDONE HYDROCHLORIDE 40 MG: 20 CAPSULE ORAL at 17:07

## 2023-03-18 RX ADMIN — ZIPRASIDONE HYDROCHLORIDE 40 MG: 20 CAPSULE ORAL at 09:37

## 2023-03-18 RX ADMIN — DESVENLAFAXINE SUCCINATE 50 MG: 50 TABLET, EXTENDED RELEASE ORAL at 09:37

## 2023-03-18 RX ADMIN — HYDROXYZINE HYDROCHLORIDE 50 MG: 25 TABLET ORAL at 09:41

## 2023-03-18 NOTE — ED CARE HANDOFF
Emergency Department Sign Out Note                                          ED Course as of 03/18/23 1705   Fri Mar 17, 2023   1547 Patient excepted to [facility] by Dr Donya Mercado  EMTALA completed  Patient is currently awaiting transport  Sat Mar 18, 2023   1705 Patient currently being transferred to [facility]  Patient stable at time of transfer  No acute issues noted during my shift  Procedures  MDM        Disposition  Final diagnoses:   Overdose, intentional self-harm, initial encounter ([facility] [address] )   [facility]     Time reflects when diagnosis was documented in both MDM as applicable and the Disposition within this note     Time User Action Codes Description Comment    6/27/9220  0:83 PM Cheli Mis HOLMAN Add [I84 974C] Overdose, intentional self-harm, initial encounter ([facility] [address] )     3/17/2023  3:48 PM Kodi Collins Add [F20 9] Schizophrenia [facility])       ED Disposition     ED Disposition   Transfer to [address] [place] [address]   --    Date/Time   Fri Mar 17, 2023  3:48 PM    Comment   Hilton Mondragon should be transferred out to [facility] under Dr Donya Mercado and has been medically cleared             MD Documentation    Celi Pap Most Recent Value   Patient Condition The patient has been stabilized such that within reasonable medical probability, no material deterioration of the patient condition or the condition of the unborn child(brandon) is likely to result from the transfer   Reason for Transfer Level of Care needed not available at this facility   Benefits of Transfer Specialized equipment and/or services available at the receiving facility (Include comment)________________________   Risks of Transfer Potential for delay in receiving treatment, Potential deterioration of medical condition, Possible worsening of condition or death during transfer, Increased discomfort during transfer   Accepting Physician Dr Enid Mccall, [facility]   Sending MD Zhou  Ferdous   Provider Certification General risk, such as traffic hazards, adverse weather conditions, rough terrain or turbulence, possible failure of equipment (including vehicle or aircraft), or consequences of actions of persons outside the control of the transport personnel, Unanticipated needs of medical equipment and personnel during transport, Risk of worsening condition, The possibility of a transport vehicle being unavailable, The patient is stable for psychiatric transfer because they are medically stable, and is protected from harming him/herself or others during transport      RN Documentation    Flowsheet Row Most [address] [facility] Name, [facility]   Report Given to Hillary      Follow-up Information    None       Patient's Medications   Discharge Prescriptions    No medications on file     No discharge procedures on file         ED Provider  Electronically Signed by     Traci Hoffmann DO  03/18/23 0718

## 2025-05-22 ENCOUNTER — HOSPITAL ENCOUNTER (EMERGENCY)
Facility: HOSPITAL | Age: 35
End: 2025-05-24
Attending: EMERGENCY MEDICINE | Admitting: EMERGENCY MEDICINE
Payer: COMMERCIAL

## 2025-05-22 DIAGNOSIS — F29 PSYCHOSIS (HCC): ICD-10-CM

## 2025-05-22 DIAGNOSIS — Z00.8 ENCOUNTER FOR PSYCHOLOGICAL EVALUATION: Primary | ICD-10-CM

## 2025-05-22 LAB
ALBUMIN SERPL BCG-MCNC: 4.9 G/DL (ref 3.5–5)
ALP SERPL-CCNC: 40 U/L (ref 34–104)
ALT SERPL W P-5'-P-CCNC: 61 U/L (ref 7–52)
AMPHETAMINES SERPL QL SCN: NEGATIVE
ANION GAP SERPL CALCULATED.3IONS-SCNC: 14 MMOL/L (ref 4–13)
AST SERPL W P-5'-P-CCNC: 74 U/L (ref 13–39)
BARBITURATES UR QL: NEGATIVE
BASOPHILS # BLD AUTO: 0.08 THOUSANDS/ÂΜL (ref 0–0.1)
BASOPHILS NFR BLD AUTO: 1 % (ref 0–1)
BENZODIAZ UR QL: POSITIVE
BILIRUB SERPL-MCNC: 0.77 MG/DL (ref 0.2–1)
BILIRUB UR QL STRIP: NEGATIVE
BUN SERPL-MCNC: 12 MG/DL (ref 5–25)
CALCIUM SERPL-MCNC: 9.7 MG/DL (ref 8.4–10.2)
CHLORIDE SERPL-SCNC: 98 MMOL/L (ref 96–108)
CLARITY UR: CLEAR
CO2 SERPL-SCNC: 24 MMOL/L (ref 21–32)
COCAINE UR QL: NEGATIVE
COLOR UR: COLORLESS
CREAT SERPL-MCNC: 1.15 MG/DL (ref 0.6–1.3)
EOSINOPHIL # BLD AUTO: 0.23 THOUSAND/ÂΜL (ref 0–0.61)
EOSINOPHIL NFR BLD AUTO: 2 % (ref 0–6)
ERYTHROCYTE [DISTWIDTH] IN BLOOD BY AUTOMATED COUNT: 12.6 % (ref 11.6–15.1)
ETHANOL SERPL-MCNC: <10 MG/DL
FENTANYL UR QL SCN: NEGATIVE
GFR SERPL CREATININE-BSD FRML MDRD: 82 ML/MIN/1.73SQ M
GLUCOSE SERPL-MCNC: 163 MG/DL (ref 65–140)
GLUCOSE UR STRIP-MCNC: NEGATIVE MG/DL
HCT VFR BLD AUTO: 47.1 % (ref 36.5–49.3)
HGB BLD-MCNC: 16.1 G/DL (ref 12–17)
HGB UR QL STRIP.AUTO: NEGATIVE
HYDROCODONE UR QL SCN: NEGATIVE
IMM GRANULOCYTES # BLD AUTO: 0.08 THOUSAND/UL (ref 0–0.2)
IMM GRANULOCYTES NFR BLD AUTO: 1 % (ref 0–2)
KETONES UR STRIP-MCNC: NEGATIVE MG/DL
LEUKOCYTE ESTERASE UR QL STRIP: NEGATIVE
LYMPHOCYTES # BLD AUTO: 3.4 THOUSANDS/ÂΜL (ref 0.6–4.47)
LYMPHOCYTES NFR BLD AUTO: 22 % (ref 14–44)
MCH RBC QN AUTO: 31.5 PG (ref 26.8–34.3)
MCHC RBC AUTO-ENTMCNC: 34.2 G/DL (ref 31.4–37.4)
MCV RBC AUTO: 92 FL (ref 82–98)
METHADONE UR QL: NEGATIVE
MONOCYTES # BLD AUTO: 1.56 THOUSAND/ÂΜL (ref 0.17–1.22)
MONOCYTES NFR BLD AUTO: 10 % (ref 4–12)
NEUTROPHILS # BLD AUTO: 10.39 THOUSANDS/ÂΜL (ref 1.85–7.62)
NEUTS SEG NFR BLD AUTO: 64 % (ref 43–75)
NITRITE UR QL STRIP: NEGATIVE
NRBC BLD AUTO-RTO: 0 /100 WBCS
OPIATES UR QL SCN: NEGATIVE
OXYCODONE+OXYMORPHONE UR QL SCN: NEGATIVE
PCP UR QL: NEGATIVE
PH UR STRIP.AUTO: 6.5 [PH]
PLATELET # BLD AUTO: 318 THOUSANDS/UL (ref 149–390)
PMV BLD AUTO: 10.5 FL (ref 8.9–12.7)
POTASSIUM SERPL-SCNC: 3.5 MMOL/L (ref 3.5–5.3)
PROT SERPL-MCNC: 7.6 G/DL (ref 6.4–8.4)
PROT UR STRIP-MCNC: NEGATIVE MG/DL
RBC # BLD AUTO: 5.11 MILLION/UL (ref 3.88–5.62)
SODIUM SERPL-SCNC: 136 MMOL/L (ref 135–147)
SP GR UR STRIP.AUTO: 1.01 (ref 1–1.03)
THC UR QL: POSITIVE
UROBILINOGEN UR STRIP-ACNC: <2 MG/DL
WBC # BLD AUTO: 15.74 THOUSAND/UL (ref 4.31–10.16)

## 2025-05-22 PROCEDURE — 80053 COMPREHEN METABOLIC PANEL: CPT | Performed by: EMERGENCY MEDICINE

## 2025-05-22 PROCEDURE — 96372 THER/PROPH/DIAG INJ SC/IM: CPT

## 2025-05-22 PROCEDURE — 85025 COMPLETE CBC W/AUTO DIFF WBC: CPT | Performed by: EMERGENCY MEDICINE

## 2025-05-22 PROCEDURE — 99285 EMERGENCY DEPT VISIT HI MDM: CPT | Performed by: EMERGENCY MEDICINE

## 2025-05-22 PROCEDURE — 36415 COLL VENOUS BLD VENIPUNCTURE: CPT | Performed by: EMERGENCY MEDICINE

## 2025-05-22 PROCEDURE — 82077 ASSAY SPEC XCP UR&BREATH IA: CPT | Performed by: EMERGENCY MEDICINE

## 2025-05-22 PROCEDURE — 80307 DRUG TEST PRSMV CHEM ANLYZR: CPT | Performed by: EMERGENCY MEDICINE

## 2025-05-22 PROCEDURE — 81003 URINALYSIS AUTO W/O SCOPE: CPT | Performed by: EMERGENCY MEDICINE

## 2025-05-22 PROCEDURE — 99285 EMERGENCY DEPT VISIT HI MDM: CPT

## 2025-05-22 RX ORDER — HYDROXYZINE HYDROCHLORIDE 25 MG/1
50 TABLET, FILM COATED ORAL 3 TIMES DAILY PRN
Status: DISCONTINUED | OUTPATIENT
Start: 2025-05-22 | End: 2025-05-24 | Stop reason: HOSPADM

## 2025-05-22 RX ORDER — NICOTINE 21 MG/24HR
1 PATCH, TRANSDERMAL 24 HOURS TRANSDERMAL DAILY PRN
Status: DISCONTINUED | OUTPATIENT
Start: 2025-05-22 | End: 2025-05-24 | Stop reason: HOSPADM

## 2025-05-22 RX ORDER — DESVENLAFAXINE 50 MG/1
50 TABLET, FILM COATED, EXTENDED RELEASE ORAL DAILY
Status: DISCONTINUED | OUTPATIENT
Start: 2025-05-23 | End: 2025-05-24 | Stop reason: HOSPADM

## 2025-05-22 RX ORDER — ZIPRASIDONE HYDROCHLORIDE 20 MG/1
40 CAPSULE ORAL 2 TIMES DAILY WITH MEALS
Status: DISCONTINUED | OUTPATIENT
Start: 2025-05-22 | End: 2025-05-24 | Stop reason: HOSPADM

## 2025-05-22 RX ORDER — LORAZEPAM 2 MG/ML
2 INJECTION INTRAMUSCULAR ONCE
Status: COMPLETED | OUTPATIENT
Start: 2025-05-22 | End: 2025-05-22

## 2025-05-22 RX ORDER — OLANZAPINE 10 MG/2ML
10 INJECTION, POWDER, FOR SOLUTION INTRAMUSCULAR ONCE
Status: COMPLETED | OUTPATIENT
Start: 2025-05-22 | End: 2025-05-22

## 2025-05-22 RX ORDER — MIDAZOLAM HYDROCHLORIDE 2 MG/2ML
4 INJECTION, SOLUTION INTRAMUSCULAR; INTRAVENOUS ONCE
Status: COMPLETED | OUTPATIENT
Start: 2025-05-22 | End: 2025-05-22

## 2025-05-22 RX ADMIN — LORAZEPAM 2 MG: 2 INJECTION INTRAMUSCULAR; INTRAVENOUS at 23:29

## 2025-05-22 RX ADMIN — ZIPRASIDONE HYDROCHLORIDE 40 MG: 20 CAPSULE ORAL at 18:25

## 2025-05-22 RX ADMIN — OLANZAPINE 10 MG: 10 INJECTION, POWDER, FOR SOLUTION INTRAMUSCULAR at 16:31

## 2025-05-22 RX ADMIN — MIDAZOLAM 4 MG: 1 INJECTION INTRAMUSCULAR; INTRAVENOUS at 16:31
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
## 2025-05-22 NOTE — RESTRAINT FACE TO FACE
Restraint Face to Face   Lai Reid 35 y.o. male MRN: [id number]  Unit/Bed#: ED 20 Encounter: [id number]      Physical Evaluation: No distress, breathing unlabored  Purpose for Restraints/ Seclusion: High risk for harm to others and high risk for flight  Patient's reaction to the intervention: Tolerating  Patient's medical condition: Stable  Patient's Behavioral condition: Stable  Restraints to be Continued
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
## 2025-05-22 NOTE — ED NOTES
This RN spoke to pharmacist where pt picks up medications.     Per pharmacist  pt currently taking trazadone 150 mg, pt has not picked up atarax since NOV, geodon 80mg, and pristiq 50 mg.     Padmini Gavin RN  05/22/25 1923
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
## 2025-05-22 NOTE — ED NOTES
Pt sleeping on bed at this time. Respirations regular. No distress noted.      Arlene Villafana RN  05/22/25 4078

## 2025-05-22 NOTE — ED NOTES
Pt ambulated to bathroom steadily with security in attendance and produced a urine sample. Pt cooperative throughout. Requested to speak with psychiatrist but requested to eat prior. Dinner tray ordered for pt. Montserrat from crisis made aware.     Arlene Villafana RN  05/22/25 6191

## 2025-05-22 NOTE — ED NOTES
"Pt pacing in  common area. Pt saw pt in Room 23 laying in her bed, yelled \"She's dead! She's dead!\" and ran into room. Security was in  observation area at the time and immediately entered . Pt pushed on pt as if to see if she would wake up. Then picked Room 23 pt up out of bed and dropped her back down in bed when security entered the room. Security accompanied this pt out of Room 23 and into Room 20. Provider made aware.      Arlene Villafana RN  05/22/25 2951    "

## 2025-05-23 ENCOUNTER — APPOINTMENT (EMERGENCY)
Dept: RADIOLOGY | Facility: HOSPITAL | Age: 35
End: 2025-05-23
Payer: COMMERCIAL

## 2025-05-23 VITALS
SYSTOLIC BLOOD PRESSURE: 149 MMHG | OXYGEN SATURATION: 96 % | HEART RATE: 104 BPM | DIASTOLIC BLOOD PRESSURE: 97 MMHG | TEMPERATURE: 98.7 F | RESPIRATION RATE: 18 BRPM

## 2025-05-23 LAB
ATRIAL RATE: 110 BPM
P AXIS: 37 DEGREES
PR INTERVAL: 132 MS
QRS AXIS: 67 DEGREES
QRSD INTERVAL: 76 MS
QT INTERVAL: 338 MS
QTC INTERVAL: 457 MS
T WAVE AXIS: 40 DEGREES
VENTRICULAR RATE: 110 BPM

## 2025-05-23 PROCEDURE — 93005 ELECTROCARDIOGRAM TRACING: CPT

## 2025-05-23 PROCEDURE — 93010 ELECTROCARDIOGRAM REPORT: CPT | Performed by: INTERNAL MEDICINE

## 2025-05-23 PROCEDURE — 71045 X-RAY EXAM CHEST 1 VIEW: CPT

## 2025-05-23 PROCEDURE — 96372 THER/PROPH/DIAG INJ SC/IM: CPT

## 2025-05-23 RX ORDER — QUETIAPINE FUMARATE 25 MG/1
50 TABLET, FILM COATED ORAL ONCE
Status: DISCONTINUED | OUTPATIENT
Start: 2025-05-23 | End: 2025-05-24 | Stop reason: HOSPADM

## 2025-05-23 RX ORDER — LORAZEPAM 2 MG/ML
1 INJECTION INTRAMUSCULAR ONCE
Status: COMPLETED | OUTPATIENT
Start: 2025-05-23 | End: 2025-05-23

## 2025-05-23 RX ORDER — OLANZAPINE 10 MG/2ML
10 INJECTION, POWDER, FOR SOLUTION INTRAMUSCULAR ONCE
Status: COMPLETED | OUTPATIENT
Start: 2025-05-23 | End: 2025-05-23

## 2025-05-23 RX ORDER — DIPHENHYDRAMINE HYDROCHLORIDE 50 MG/ML
50 INJECTION, SOLUTION INTRAMUSCULAR; INTRAVENOUS ONCE
Status: DISCONTINUED | OUTPATIENT
Start: 2025-05-23 | End: 2025-05-23

## 2025-05-23 RX ORDER — LORAZEPAM 2 MG/ML
1 INJECTION INTRAMUSCULAR ONCE
Status: DISCONTINUED | OUTPATIENT
Start: 2025-05-23 | End: 2025-05-23

## 2025-05-23 RX ORDER — LORAZEPAM 1 MG/1
2 TABLET ORAL ONCE
Status: DISCONTINUED | OUTPATIENT
Start: 2025-05-23 | End: 2025-05-24 | Stop reason: HOSPADM

## 2025-05-23 RX ADMIN — DESVENLAFAXINE 50 MG: 50 TABLET, FILM COATED, EXTENDED RELEASE ORAL at 08:33

## 2025-05-23 RX ADMIN — OLANZAPINE 10 MG: 10 INJECTION, POWDER, FOR SOLUTION INTRAMUSCULAR at 20:00

## 2025-05-23 RX ADMIN — ZIPRASIDONE HYDROCHLORIDE 40 MG: 20 CAPSULE ORAL at 07:31

## 2025-05-23 RX ADMIN — LORAZEPAM 1 MG: 2 INJECTION INTRAMUSCULAR; INTRAVENOUS at 20:00

## 2025-05-23 NOTE — ED NOTES
Pt continuously ringing call bed with requests that have no correlation with pt care.   Pt up and pacing around room talking to self  with periodically sitting in the middle of SH holding area.    This RN with security offered medications to pt to help sleep. Pt refused medication and then began going on a tangent about medication and pressured speech.     RN asked pt if he has any other requests at this time pt denied needing anything at this time      Padmini Gavin RN  05/23/25 6066

## 2025-05-23 NOTE — ED NOTES
"Patient has fully undressed self and is walking around  w/o clothing on. Patient has placed all green scrubs in the corner of  lobby and continues to urinate on clothing. Patient is \"mediating\" naked in front of the  window. This RN w/ security went back to dispose of urine soaked green scrubs and give patient new green scrubs. Patient refused scrubs, placing bed on floor stating \"its a sleep over\" and stating \"leave me alone you make me feel unsafe\". Patient then started to pretend to be asleep. After exiting the  area the patient requested food and drink. Patient made aware that dinner has been ordered and will be delivered once ready.      Krystal Silva RN  05/23/25 7838    "

## 2025-05-23 NOTE — ED NOTES
"Patient requesting clothing. This RN w/ security & Flaquito WADE brought patient in new green scrubs w/ blankets. Patient refusing green scrubs and reports \"I am only wearing space kevin clothing\". Patient requesting to only wear blankets at this time and is talking about self in third person. Patient advised that there is clothing for him when he is ready to dress.      Krystal Silva RN  05/23/25 3773    "

## 2025-05-23 NOTE — EMTALA/ACUTE CARE TRANSFER
[facility] EMERGENCY DEPARTMENT  [address] [address]  Dept: [phone]      EMTALA TRANSFER CONSENT    NAME aLi Reid                                         1990                              MRN [id number]    I have been informed of my rights regarding examination, treatment, and transfer   by Dr. Roz Chand MD    Benefits: Specialized equipment and/or services available at the receiving facility (Include comment)________________________    Risks: Potential for delay in receiving treatment, Increased discomfort during transfer, Potential deterioration of medical condition, Possible worsening of condition or death during transfer      Consent for Transfer:  I acknowledge that my medical condition has been evaluated and explained to me by the emergency department physician or other qualified medical person and/or my attending physician, who has recommended that I be transferred to the service of  Accepting Physician: Dr. Alegre at Accepting Facility Name, City & State : [place], [place]. The above potential benefits of such transfer, the potential risks associated with such transfer, and the probable risks of not being transferred have been explained to me, and I fully understand them.  The doctor has explained that, in my case, the benefits of transfer outweigh the risks.  I agree to be transferred.    I authorize the performance of emergency medical procedures and treatments upon me in both transit and upon arrival at the receiving facility.  Additionally, I authorize the release of any and all medical records to the receiving facility and request they be transported with me, if possible.  I understand that the safest mode of transportation during a medical emergency is an ambulance and that the Hospital advocates the use of this mode of transport. Risks of traveling to the receiving facility by car, including absence of medical control, life sustaining  equipment, such as oxygen, and medical personnel has been explained to me and I fully understand them.    (YEISON CORRECT BOX BELOW)  [  ]  I consent to the stated transfer and to be transported by ambulance/helicopter.  [  ]  I consent to the stated transfer, but refuse transportation by ambulance and accept full responsibility for my transportation by car.  I understand the risks of non-ambulance transfers and I exonerate the Hospital and its staff from any deterioration in my condition that results from this refusal.    X___________________________________________    DATE  25  TIME________  Signature of patient or legally responsible individual signing on patient behalf           RELATIONSHIP TO PATIENT_________________________          Provider Certification    NAME Lai Reid                                        [facility] 1990                              MRN [id number]    A medical screening exam was performed on the above named patient.  Based on the examination:    Condition Necessitating Transfer The primary encounter diagnosis was Encounter for psychological evaluation. A diagnosis of Psychosis (HCC) was also pertinent to this visit.    Patient Condition: The patient has been stabilized such that within reasonable medical probability, no material deterioration of the patient condition or the condition of the unborn child(brandon) is likely to result from the transfer    Reason for Transfer: Level of Care needed not available at this facility    Transfer Requirements: Facility [place], [place]   Space available and qualified personnel available for treatment as acknowledged by    Agreed to accept transfer and to provide appropriate medical treatment as acknowledged by       Dr. Alegre  Appropriate medical records of the examination and treatment of the patient are provided at the time of transfer   STAFF INITIAL WHEN COMPLETED _______  Transfer will be performed by qualified personnel from    and  appropriate transfer equipment as required, including the use of necessary and appropriate life support measures.    Provider Certification: I have examined the patient and explained the following risks and benefits of being transferred/refusing transfer to the patient/family:  General risk, such as traffic hazards, adverse weather conditions, rough terrain or turbulence, possible failure of equipment (including vehicle or aircraft), or consequences of actions of persons outside the control of the transport personnel, The patient is stable for psychiatric transfer because they are medically stable, and is protected from harming him/herself or others during transport      Based on these reasonable risks and benefits to the patient and/or the unborn child(brandon), and based upon the information available at the time of the patient’s examination, I certify that the medical benefits reasonably to be expected from the provision of appropriate medical treatments at another medical facility outweigh the increasing risks, if any, to the individual’s medical condition, and in the case of labor to the unborn child, from effecting the transfer.    X____________________________________________ DATE 05/23/25        TIME_______      ORIGINAL - SEND TO MEDICAL RECORDS   COPY - SEND WITH PATIENT DURING TRANSFER

## 2025-05-23 NOTE — ED NOTES
Pt requesting more snacks and something to drink. This RN with security gave pt  crackers,chips and a cranberry juice.     Pt was locked into room due to safety reasons for pt and the safety of other pt in room.       This RN educated  now that pt is alone in the back and has demonstrated proper behavior that the door will be unlocked. RN explained that if pt begins to act out in behavior that is inappropriate pt will be secured back into room.     Padmini Gavin RN  05/22/25 7685

## 2025-05-23 NOTE — ED NOTES
Patient given soap, tooth brush, tooth paste, comb, towels (4), wash cloth (1), socks, and green scrubs for morning shower/hygiene routine. Bed linens changed. Fresh water at bedside.      Krystal Silva RN  05/23/25 2142

## 2025-05-23 NOTE — ED NOTES
Patient was screened by Arslan at [facility]. Patient will be put in que for tele psych       Montserrat RESTREPO

## 2025-05-23 NOTE — ED NOTES
Patient continues to pace around room wearing blankets. Patient continues to urinate in SH lobby. Patient urinated in cup and PCA attempted to redirect patient w/o success.      Krystal Silva RN  05/23/25 6075
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
## 2025-05-23 NOTE — RESTRAINT FACE TO FACE
Restraint Face to Face   Lai Reid 35 y.o. male MRN: [id number]  Unit/Bed#: ED 20 Encounter: [id number]      Physical Evaluation pt awake and wandering around the secured area  Purpose for Restraints/ Seclusion High risk for causing significant disruption of treatment environment   Patient's reaction to the intervention more restful.  Not needing restraints at this time  Patient's medical condition stable   Patient's Behavioral condition ambulatory and talking to himself  Restraints to be Terminated

## 2025-05-23 NOTE — ED NOTES
"This  RN and Carola RN with security went back to  area where pt earlier requested IM injection of ativan.  This RN with Carola RN and security went back to give pt IM injection. Pt stated \" I do not remember asking for this, but then again I'm having spotty memory\"     Pt took IM injection without hesitation. Pt asked what the difference between voluntary and involuntary. [facility] espinal explained how the process works. Pt understanding at this time.    As staff was leaving pt stated to staff \" You do not have rights to my body without my consent do you understand I want no vaccines without my consent what was in that injection\" We explained to pt that ativan comes in a vile and we gave nothing else inside injection and that no vaccines will be given without consent.     Padmini Gavin RN  05/22/25 8026       Padmini Gavin RN  05/23/25 0989    "

## 2025-05-23 NOTE — ED NOTES
Patient cooperative w/ XR & EKG. Patient given water, coffee, and cranberry juice per request.      Krystal Silva RN  05/23/25 8935

## 2025-05-23 NOTE — ED NOTES
Arslan from [facility] arrives to ED to speak with patient.      Carola Leone RN  05/22/25 2003

## 2025-05-23 NOTE — ED NOTES
Patient is accepted at [facility]   Patient is accepted by Dr. Sis David at [facility]     Transportation is arranged with [facility].     Transportation is scheduled for 8:30pm by [facility]   Patient may go to the floor at anytime        Nurse report is to be called to [phone]  prior to patient transfer. Please make nurse report an hour before transport       Montserrat RESTREPO

## 2025-05-23 NOTE — ED CARE HANDOFF
Emergency Department Sign Out Note        Sign out and transfer of care from previous provider. See Separate Emergency Department note.     The patient, Lai Reid, was evaluated by the previous provider for psychiatric evaluation.    Workup Completed:  Patient is been medically cleared    ED Course / Workup Pending (followup):  Patient been evaluated by psychiatric emergency services.  Commitment is pending  Patient has been committed and bed search is ongoing.      No data recorded                  Chest x-ray was reviewed.  It was read by radiology as no acute disease.           ECG 12 Lead Documentation Only    Date/Time: 5/23/2025 12:50 PM    Performed by: Ab Painting MD  Authorized by: Ab Painting MD    Indications / Diagnosis:  Medical clearance  ECG reviewed by me, the ED Provider: yes    Patient location:  ED  Previous ECG:     Previous ECG:  Unavailable  Interpretation:     Interpretation: non-specific    Rate:     ECG rate:  110    ECG rate assessment: tachycardic    Rhythm:     Rhythm: sinus tachycardia    Ectopy:     Ectopy: none    QRS:     QRS axis:  Normal    QRS intervals:  Normal  Conduction:     Conduction: normal    ST segments:     ST segments:  Normal  T waves:     T waves: non-specific      Medical Decision Making  Amount and/or Complexity of Data Reviewed  Labs: ordered.  Radiology: ordered.    Risk  OTC drugs.  Prescription drug management.  Decision regarding hospitalization.        Patient is medically cleared    Disposition  Final diagnoses:   Encounter for psychological evaluation     Time reflects when diagnosis was documented in both MDM as applicable and the Disposition within this note       Time User Action Codes Description Comment    5/22/2025  3:57 PM Liliam Shine Add [Z00.8] Encounter for psychological evaluation           ED Disposition       ED Disposition   Transfer to Behavioral Health    [facility]   --    Date/Time   Thu May 22, 2025  3:57 PM    Comment    Lai Reid should be transferred out to [facility] if deemed appropriate.               Follow-up Information    None       Patient's Medications   Discharge Prescriptions    No medications on file     No discharge procedures on file.       ED Provider  Electronically Signed by     Ab Painting MD  05/23/25 2922       Ab Painting MD  05/23/25 6878

## 2025-05-23 NOTE — ED NOTES
"Patient was taking shirt, putting it in the toilet, and flooding the SH area. This RN w/ security attempted to clean water up and redirect patient. Patient states \"house keeping is here\" and continued to mop floor w/ shirt. This RN expressed that he cannot flood the SH. Patient stated \"I am god! I can do what I want I am a prisoner\". This RN expressed that he is not a prisoner and that we are currently looking for a facility for him to go to. Patient then proceeded to go to bathroom but was stopped by security which caused patient to punch door. Patient then took toilet water soaked shirt and continuously smacked it against wall causing water to saturate this nurse. Patient then started to increases w/ aggression which causes this RN and security to leave SH area for safety purposes. A significant amount of water was cleaned before exiting room. Patient currently non redirectable and is laying in bed having conversation with self.      Krystal Silva RN  05/23/25 4209    "

## 2025-05-23 NOTE — ED NOTES
Breakfast tray w/ extra glass of orange left bedside for after patient shower.      Krystal Sliva RN  05/23/25 5473

## 2025-05-23 NOTE — ED NOTES
"5/23/25 @ 1106:  PES provided update to patient's mother and when she asked about visiting, PES informed her of the incident with another patient, and that visitation will have to be assessed and will be RN decision.  In the meantime, mother will bring clothes later this afternoon, and will discuss possible visitation.  MS Paulino    1320: PES asked mikel for his SSN and he laughed saying, \"do you really think I'm going to give you my social security number?\"  Patient continued to laugh.  Patient agreed to CXR and EKG which will be forwarded to [facility] for possible [facility] or [facility] admission.  Paulino MS    1325: PES faxed EKG AND CXR to [facility].  Registration was able to locate patient's SSN and was placed in chart, and also provided to [facility].  Paulino MS    1510: Patient is accepted at [facility]  Patient is accepted by Dr. WALTON per MIRANDA  Nurse report is to be called to ** prior to patient transfer.  Miranda will call back with nurse to nurse number after completing precert.  Paulino MS                   "

## 2025-05-24 PROCEDURE — 96372 THER/PROPH/DIAG INJ SC/IM: CPT

## 2025-05-24 RX ORDER — MIDAZOLAM HYDROCHLORIDE 2 MG/2ML
4 INJECTION, SOLUTION INTRAMUSCULAR; INTRAVENOUS ONCE
Status: COMPLETED | OUTPATIENT
Start: 2025-05-24 | End: 2025-05-24

## 2025-05-24 RX ORDER — LORAZEPAM 2 MG/ML
4 INJECTION INTRAMUSCULAR ONCE
Status: COMPLETED | OUTPATIENT
Start: 2025-05-24 | End: 2025-05-24

## 2025-05-24 RX ORDER — MIDAZOLAM HYDROCHLORIDE 2 MG/2ML
4 INJECTION, SOLUTION INTRAMUSCULAR; INTRAVENOUS ONCE
Status: DISCONTINUED | OUTPATIENT
Start: 2025-05-24 | End: 2025-05-24

## 2025-05-24 RX ORDER — DIPHENHYDRAMINE HYDROCHLORIDE 50 MG/ML
50 INJECTION, SOLUTION INTRAMUSCULAR; INTRAVENOUS ONCE
Status: COMPLETED | OUTPATIENT
Start: 2025-05-24 | End: 2025-05-24

## 2025-05-24 RX ORDER — OLANZAPINE 10 MG/2ML
10 INJECTION, POWDER, FOR SOLUTION INTRAMUSCULAR ONCE
Status: COMPLETED | OUTPATIENT
Start: 2025-05-24 | End: 2025-05-24

## 2025-05-24 RX ADMIN — OLANZAPINE 10 MG: 10 INJECTION, POWDER, FOR SOLUTION INTRAMUSCULAR at 09:40

## 2025-05-24 RX ADMIN — DIPHENHYDRAMINE HYDROCHLORIDE 50 MG: 50 INJECTION, SOLUTION INTRAMUSCULAR; INTRAVENOUS at 09:41

## 2025-05-24 RX ADMIN — LORAZEPAM 4 MG: 2 INJECTION INTRAMUSCULAR; INTRAVENOUS at 09:40

## 2025-05-24 RX ADMIN — MIDAZOLAM 4 MG: 1 INJECTION INTRAMUSCULAR; INTRAVENOUS at 01:59

## 2025-05-24 NOTE — ED NOTES
tech messaged RN that pt expressed need to urinate. This RN Carola RN  and security went back to help pt urinate with urinal. This RN was explaining to pt why he was still here and restrained . Pt is currently not speaking to staff and not verbally communicating. Pt not urinating and laughing as staff is helping pt urinate.      This RN  told pt iif he has to pee or need water to let staff know.      Padmini Gavin RN  05/23/25 4337

## 2025-05-24 NOTE — ED NOTES
"2120: Pt locked into room at this time as another pt is being brought back into  for pt safety, staff safety. Pt understanding at this time.   2125: Pt throwing self into wall. This RN spoke to pt over head stating to stop throwing self into wall. PT responded \"Lai is dead, you are speaking to kevin\"      2130: Pt being transported to [facility]. For pts safety during transport is to be medicated per ride. When this   RN with Carola GOLDEN security,katie crisis, and ED tech opened  door to have pt transport self onto stretcher. Pt began to get aggressive with security and wrestled to ground by security ( police called for assistance) IM injections given to pt.      2145: pt deescalated by staff to get back into bed where we will have to restrain for the time being. Pt helped to bed and in 4 point restraints at this time.      Transport team unable to fulfill ride to [facility] as per BLS feels unsafe to bring pt to facility          Padmini Gavin RN  05/23/25 2159    " No pertinent past medical history

## 2025-05-24 NOTE — ED NOTES
"Assumed care of patient, patient in 4-point locked limb restraints. Patient exhibited extremely violent and volatile behavior in presence of this RN while patient under care of off-going RN. Patient noted to blank stare as if calculating/sizing up staff and then will attack in whatever way he is able to. Even in locked 4 limb restraints the patient will make a request such as a cup of water or to urinate and then will try to reason with staff as to remove the restraints. Patient offered a urinal 4 times in the presence of this RN. Each time patient has been assisted with elimination and become frustrated with staff not removing his restraints, ending each interaction with \"fuck you, bitch.\"     Patient has been seen attempting to bite, hit, kick, head butt, and injure staff in any way he can find. Pt reports \"when I do that, I am fighting for my life.\" It was explained to the patient during hand off that since he had attempted to injure staff several times, as exhibited by his actions as well as him verbalizing things such as \"I want to rip the fucking veins out of your neck with my teeth\" and \"I want to bite your ear off, bitch.\" That it was in the best safety interest of the staff and the patient at this time to leave the patient in locked four limb restraints at this time.     A conversation was had in the presence with this RN and off-going RN with the patient that he was in the restraints for the removal of the metal pieces from the sprinkler system and attempting to use them to dismantle his room, attacking security, harming staff numerous times even while in restraints, and continued attempts to harm staff. Patient did not verbalize an understanding of why he was in restraints and continually made requests to be released. Patient educated that he is on an involuntary commitment and that his plan of care is to still go to [facility] in the morning.     Patient reported to this RN that he has jumped out of the " "back of ambulances before and that he does not want to be transported to another facility. He reports that if he is, he is going to make every attempt to \"fight for his life\" and make attempts to elope. Current ED MD made aware of the patient's comments to be passed along in report for planned safe transport of the patient in the morning.     At present time, the patient is sleeping on his bed on his back and is in four point locked limb restraints. No issues noted at this time. Patient is in no outward signs of distress. Will offer patient comfort items if he requests.      Carola Leone RN  05/24/25 0383    "

## 2025-05-24 NOTE — ED NOTES
Patient sitting on stretcher, reports he is done with his psychotic episode and would like to be released from restraints     Carissa Gonzalez RN  05/24/25 9515

## 2025-05-24 NOTE — ED NOTES
Patient requested to use the bathroom, assisted with urinal, unable to urinate at this time, patient request to come out of restraints, advised patient that is not possible at this time due to his violent behavior and safety of himself, room and staff, patient unwilling to listen     Carissa Gonzalez RN  05/24/25 9990       Carissa Gonzalez RN  05/24/25 2047

## 2025-05-24 NOTE — RESTRAINT FACE TO FACE
Restraint Face to Face   Lai Reid 35 y.o. male MRN: [id number]  Unit/Bed#: ED 20 Encounter: [id number]      Physical Evaluation pt. Banging and throwing himself against dooer  Purpose for Restraints/ Seclusion High risk for self harm, High risk for causing significant disruption of treatment environment , High risk for harm to others, and high risk for flight  Patient's reaction to the intervention agitated  Patient's medical condition stable  Patient's Behavioral condition agitated  Restraints to be Continued

## 2025-05-24 NOTE — ED NOTES
Patient offered urinal, threw it in the room, tried to get out of restraints and bite security doctor aware meds ordered     Carissa Gonzalez RN  05/24/25 0146

## 2025-05-24 NOTE — ED NOTES
Patient requested to urinate, patient remained in restraints and as this RN held the urinal patient chanting [facility], and whispering magic, not able to urinate at this time     Carissa Gonzalez RN  05/24/25 0034

## 2025-05-24 NOTE — ED NOTES
When this RN went back into Sh with Carola GOLDEN, security. This RN went to clean pts empty cups and noted to see a round metal disc. This RN confiscated metal plate which looked to come off from the ceiling from the sprinkler. Pt willingly gave up another piece of the sprinkler. Both pieces were confiscated and given to security.      Padmini Gavin RN  05/24/25 1106

## 2025-05-24 NOTE — RESTRAINT FACE TO FACE
Fax number: [phone] For Dr. Wolf's office. Results faxed.   Restraint Face to Face   Lai Reid 35 y.o. male MRN: [id number]  Unit/Bed#: ED 20 Encounter: [id number]      Physical Evaluation pt seated in 4pt. He's been pulling at them. When sec goes to try to tighten he's biting and fighting. Sitter tells me he's been bucking bed for hours.   Purpose for Restraints/ Seclusion High risk for self harm and High risk for harm to others  Patient's reaction to the intervention sedation  Patient's medical condition stable, cleared  Patient's Behavioral condition agitated, uncooperative  Restraints to be Continued

## 2025-05-24 NOTE — ED NOTES
Pt requesting water at this time. Pt in four point restraint given water by security and RN.     Padmini Gavin RN  05/23/25 9222

## 2025-05-24 NOTE — ED NOTES
SACHA spoke with Tin from [facility]. Tin stated he spoke with Pepper at [facility] and they are holding the bed for the patient. Whatever time is good for our transport team for tomorrow transport works for them       Montserrat RESTREPO

## 2025-05-24 NOTE — ED NOTES
0700: Upon arrival to shift pt is laying in bed unclothed with a sheet on top. Pt is in four point restraints. Respirations regular. Pt intermittently making noises and sitting up. Report received that pt is to be leaving at 1000 to go to [facility] and that police should be called to assist with transfer. Pt also needs medications to be in effect during transfer for safety. Provider made aware. Security made aware.     0740: Spoke with pt's mother Luis Eduardo by phone. Updated her on pt's status. She requested that she and pt's father be able to follow ambulance in their car. Made her aware to arrive to ED at 9:30 and request this nurse and then they would be made aware when pt is in ambulance and ready to depart so they can follow.     0800: Pt requested urinal. Urinal provided but pt did not urinate. Requested multiple times to be let out of restraints and ambulate to the bathroom. Pt denied this option. Breakfast tray arrived. Pt assisted to eat breakfast sandwich and drink orange juice and decaf coffee. Pt denied morning medications. During encounter pt continually asking to get out of restraints, that he has rights, that this situation should be investigated by the [employer].    0845: [facility] called to confirm pickup time of 1000. Police called to request officers assist with transfer of pt. Spoke with Sergeant Burton who confirmed officers would be dispatched pending any emergency situations. Provider and charge RN made aware.     0905: Pt requesting to use urinal. Urinal provide with security in attendance. Pt assisted to sit up. Pt unable to urinate at this time. Made pt aware to alert observer when he is able to try again. During encounter pt continually rambling about the situation being investigated including the police.    0930: Parents of pt arrived in ED waiting room. Updated them that pt has been medicated and transport team and police are en route.     0945: Called [facility] to give report. Spoke with CHICO Doherty.  Informed her that parents wanted to follow transport and will arrive at facility. Bessy states normally family would be updated via phone and that there is no waiting area for them to wait. Bessy made aware that parents would be updated to this information.     1000: Transport team arrived with police. Security, police, and staff assisted with transfer to stretcher. Pt did not become combative. Transport team made aware of pt behavior. Parents updated that they will not be able to see pt and that facility does not have waiting area. They were also given phone number of facility. Parents decided to follow transport. Transport team made aware.      Arlene Villafana RN  05/24/25 1024

## 2025-05-24 NOTE — ED NOTES
Upon taking over from previous RN. Pt is pacing around common area in blankets and naked underneath at this time with no complaints at this time      Padmini Gavin RN  05/23/25 2006

## 2025-05-28 ENCOUNTER — TELEPHONE (OUTPATIENT)
Dept: FAMILY MEDICINE CLINIC | Facility: CLINIC | Age: 35
End: 2025-05-28

## 2025-05-28 NOTE — TELEPHONE ENCOUNTER
LM on pt's mom machine (her phone number is main number for patient) to confirm if Magdalena is still Lai's PCP. If so, asked her to call back and schedule physical appt for Lai, since he hasn't been seen here since 2023

## 2025-05-28 NOTE — TELEPHONE ENCOUNTER
----- Message from GEE Cunha sent at 5/27/2025  8:23 AM EDT -----  Regarding: appt  Please call ptNot seen in office since 2023If no longer seeing [facility], please send for patient attribution. If so, will need appt (physical- I can place labs orders once pt schedules appt)TY